# Patient Record
Sex: FEMALE | URBAN - METROPOLITAN AREA
[De-identification: names, ages, dates, MRNs, and addresses within clinical notes are randomized per-mention and may not be internally consistent; named-entity substitution may affect disease eponyms.]

---

## 2017-01-09 ENCOUNTER — EMERGENCY (EMERGENCY)
Facility: HOSPITAL | Age: 82
LOS: 0 days | Discharge: HOME | End: 2017-01-10
Admitting: EMERGENCY MEDICINE
Payer: MEDICARE

## 2017-01-09 DIAGNOSIS — R00.2 PALPITATIONS: ICD-10-CM

## 2017-01-09 DIAGNOSIS — I10 ESSENTIAL (PRIMARY) HYPERTENSION: ICD-10-CM

## 2017-01-09 DIAGNOSIS — M06.9 RHEUMATOID ARTHRITIS, UNSPECIFIED: ICD-10-CM

## 2017-01-09 DIAGNOSIS — J11.1 INFLUENZA DUE TO UNIDENTIFIED INFLUENZA VIRUS WITH OTHER RESPIRATORY MANIFESTATIONS: ICD-10-CM

## 2017-01-09 DIAGNOSIS — E78.5 HYPERLIPIDEMIA, UNSPECIFIED: ICD-10-CM

## 2017-01-09 DIAGNOSIS — J10.1 INFLUENZA DUE TO OTHER IDENTIFIED INFLUENZA VIRUS WITH OTHER RESPIRATORY MANIFESTATIONS: ICD-10-CM

## 2017-01-09 PROCEDURE — 99284 EMERGENCY DEPT VISIT MOD MDM: CPT

## 2017-01-09 PROCEDURE — 71010: CPT | Mod: 26

## 2017-01-09 PROCEDURE — 93010 ELECTROCARDIOGRAM REPORT: CPT

## 2017-01-10 DIAGNOSIS — Z79.899 OTHER LONG TERM (CURRENT) DRUG THERAPY: ICD-10-CM

## 2017-01-10 DIAGNOSIS — R42 DIZZINESS AND GIDDINESS: ICD-10-CM

## 2017-01-10 DIAGNOSIS — J11.1 INFLUENZA DUE TO UNIDENTIFIED INFLUENZA VIRUS WITH OTHER RESPIRATORY MANIFESTATIONS: ICD-10-CM

## 2017-01-10 DIAGNOSIS — R00.2 PALPITATIONS: ICD-10-CM

## 2017-01-10 PROCEDURE — 99284 EMERGENCY DEPT VISIT MOD MDM: CPT

## 2017-01-10 PROCEDURE — 93010 ELECTROCARDIOGRAM REPORT: CPT

## 2017-01-16 ENCOUNTER — RX RENEWAL (OUTPATIENT)
Age: 82
End: 2017-01-16

## 2017-01-16 DIAGNOSIS — Z13.820 ENCOUNTER FOR SCREENING FOR OSTEOPOROSIS: ICD-10-CM

## 2017-03-22 ENCOUNTER — APPOINTMENT (OUTPATIENT)
Dept: OBGYN | Facility: CLINIC | Age: 82
End: 2017-03-22

## 2017-03-22 VITALS
SYSTOLIC BLOOD PRESSURE: 132 MMHG | HEIGHT: 65 IN | BODY MASS INDEX: 24.66 KG/M2 | WEIGHT: 148 LBS | DIASTOLIC BLOOD PRESSURE: 86 MMHG

## 2017-03-22 DIAGNOSIS — K64.8 OTHER HEMORRHOIDS: ICD-10-CM

## 2017-03-22 LAB
BILIRUB UR QL STRIP: NORMAL
COLLECTION METHOD: NORMAL
GLUCOSE UR-MCNC: NORMAL
HCG UR QL: 0.2 EU/DL
HEMOCCULT SP1 STL QL: NEGATIVE
HGB UR QL STRIP.AUTO: ABNORMAL
KETONES UR-MCNC: NORMAL
LEUKOCYTE ESTERASE UR QL STRIP: NORMAL
NITRITE UR QL STRIP: NORMAL
PH UR STRIP: 5.5
PROT UR STRIP-MCNC: NORMAL
SP GR UR STRIP: 1.02

## 2017-03-27 LAB
BACTERIA UR CULT: NORMAL
CYTOLOGY CVX/VAG DOC THIN PREP: NORMAL

## 2017-06-29 ENCOUNTER — APPOINTMENT (OUTPATIENT)
Dept: OBGYN | Facility: CLINIC | Age: 82
End: 2017-06-29

## 2017-06-29 VITALS
WEIGHT: 148 LBS | BODY MASS INDEX: 24.36 KG/M2 | SYSTOLIC BLOOD PRESSURE: 129 MMHG | HEIGHT: 65.5 IN | DIASTOLIC BLOOD PRESSURE: 78 MMHG

## 2017-06-29 DIAGNOSIS — Z82.5 FAMILY HISTORY OF ASTHMA AND OTHER CHRONIC LOWER RESPIRATORY DISEASES: ICD-10-CM

## 2017-06-29 DIAGNOSIS — Z80.9 FAMILY HISTORY OF MALIGNANT NEOPLASM, UNSPECIFIED: ICD-10-CM

## 2017-06-29 DIAGNOSIS — Z82.3 FAMILY HISTORY OF STROKE: ICD-10-CM

## 2017-06-29 LAB
BILIRUB UR QL STRIP: NORMAL
COLLECTION METHOD: NORMAL
GLUCOSE UR-MCNC: NORMAL
HCG UR QL: 0.2 EU/DL
HGB UR QL STRIP.AUTO: NORMAL
KETONES UR-MCNC: NORMAL
LEUKOCYTE ESTERASE UR QL STRIP: NORMAL
NITRITE UR QL STRIP: NORMAL
PH UR STRIP: 5
PROT UR STRIP-MCNC: NORMAL
SP GR UR STRIP: >=1.03

## 2017-06-29 RX ORDER — SIMVASTATIN 80 MG/1
TABLET, FILM COATED ORAL
Refills: 0 | Status: DISCONTINUED | COMMUNITY

## 2017-10-04 ENCOUNTER — APPOINTMENT (OUTPATIENT)
Dept: OBGYN | Facility: CLINIC | Age: 82
End: 2017-10-04
Payer: MEDICARE

## 2017-10-04 VITALS
BODY MASS INDEX: 25.83 KG/M2 | HEIGHT: 65 IN | WEIGHT: 155 LBS | SYSTOLIC BLOOD PRESSURE: 122 MMHG | DIASTOLIC BLOOD PRESSURE: 78 MMHG

## 2017-10-04 DIAGNOSIS — N90.89 OTHER SPECIFIED NONINFLAMMATORY DISORDERS OF VULVA AND PERINEUM: ICD-10-CM

## 2017-10-04 PROCEDURE — 99213 OFFICE O/P EST LOW 20 MIN: CPT

## 2017-10-04 RX ORDER — RANITIDINE 75 MG/1
TABLET ORAL
Refills: 0 | Status: DISCONTINUED | COMMUNITY
End: 2017-10-04

## 2017-11-21 ENCOUNTER — APPOINTMENT (OUTPATIENT)
Dept: DERMATOLOGY | Facility: CLINIC | Age: 82
End: 2017-11-21

## 2018-03-26 ENCOUNTER — APPOINTMENT (OUTPATIENT)
Dept: OBGYN | Facility: CLINIC | Age: 83
End: 2018-03-26
Payer: MEDICARE

## 2018-03-26 ENCOUNTER — APPOINTMENT (OUTPATIENT)
Dept: DERMATOLOGY | Facility: CLINIC | Age: 83
End: 2018-03-26
Payer: MEDICARE

## 2018-03-26 VITALS
HEIGHT: 65 IN | DIASTOLIC BLOOD PRESSURE: 74 MMHG | SYSTOLIC BLOOD PRESSURE: 125 MMHG | BODY MASS INDEX: 25.83 KG/M2 | WEIGHT: 155 LBS

## 2018-03-26 DIAGNOSIS — Z86.19 PERSONAL HISTORY OF OTHER INFECTIOUS AND PARASITIC DISEASES: ICD-10-CM

## 2018-03-26 DIAGNOSIS — I78.1 NEVUS, NON-NEOPLASTIC: ICD-10-CM

## 2018-03-26 DIAGNOSIS — L82.1 OTHER SEBORRHEIC KERATOSIS: ICD-10-CM

## 2018-03-26 DIAGNOSIS — D18.00 HEMANGIOMA UNSPECIFIED SITE: ICD-10-CM

## 2018-03-26 DIAGNOSIS — Z86.39 PERSONAL HISTORY OF OTHER ENDOCRINE, NUTRITIONAL AND METABOLIC DISEASE: ICD-10-CM

## 2018-03-26 LAB — HEMOCCULT SP1 STL QL: NEGATIVE

## 2018-03-26 PROCEDURE — 99214 OFFICE O/P EST MOD 30 MIN: CPT

## 2018-03-26 PROCEDURE — 99213 OFFICE O/P EST LOW 20 MIN: CPT

## 2018-03-26 PROCEDURE — 82270 OCCULT BLOOD FECES: CPT

## 2018-03-26 PROCEDURE — 81003 URINALYSIS AUTO W/O SCOPE: CPT | Mod: QW

## 2018-03-26 RX ORDER — ERGOCALCIFEROL 1.25 MG/1
1.25 MG CAPSULE, LIQUID FILLED ORAL
Qty: 12 | Refills: 0 | Status: DISCONTINUED | COMMUNITY
Start: 2016-10-04 | End: 2018-03-26

## 2018-03-26 RX ORDER — RALOXIFENE HYDROCHLORIDE 60 MG/1
60 TABLET, FILM COATED ORAL DAILY
Qty: 90 | Refills: 3 | Status: DISCONTINUED | COMMUNITY
Start: 2017-06-29 | End: 2018-03-26

## 2018-03-26 RX ORDER — ABATACEPT 125 MG/ML
INJECTION, SOLUTION SUBCUTANEOUS
Refills: 0 | Status: DISCONTINUED | COMMUNITY
End: 2018-03-26

## 2018-03-26 RX ORDER — FUROSEMIDE 40 MG/1
40 TABLET ORAL
Qty: 5 | Refills: 0 | Status: DISCONTINUED | COMMUNITY
Start: 2017-05-19 | End: 2018-03-26

## 2018-03-27 LAB
BILIRUB UR QL STRIP: NORMAL
COLLECTION METHOD: NORMAL
GLUCOSE UR-MCNC: NORMAL
HCG UR QL: 0.2 EU/DL
HGB UR QL STRIP.AUTO: NORMAL
KETONES UR-MCNC: NORMAL
LEUKOCYTE ESTERASE UR QL STRIP: NORMAL
NITRITE UR QL STRIP: NORMAL
PH UR STRIP: 5.5
PROT UR STRIP-MCNC: NORMAL
SP GR UR STRIP: 1.02

## 2018-04-03 LAB — CYTOLOGY CVX/VAG DOC THIN PREP: NORMAL

## 2018-06-30 ENCOUNTER — EMERGENCY (EMERGENCY)
Facility: HOSPITAL | Age: 83
LOS: 0 days | Discharge: HOME | End: 2018-07-01
Attending: EMERGENCY MEDICINE
Payer: MEDICARE

## 2018-06-30 VITALS — WEIGHT: 153 LBS | HEIGHT: 63 IN

## 2018-06-30 DIAGNOSIS — R00.2 PALPITATIONS: ICD-10-CM

## 2018-06-30 DIAGNOSIS — R55 SYNCOPE AND COLLAPSE: ICD-10-CM

## 2018-06-30 DIAGNOSIS — M06.9 RHEUMATOID ARTHRITIS, UNSPECIFIED: ICD-10-CM

## 2018-06-30 LAB
ALBUMIN SERPL ELPH-MCNC: 3 G/DL — LOW (ref 3.3–5)
ALP SERPL-CCNC: 85 U/L — SIGNIFICANT CHANGE UP (ref 40–120)
ALT FLD-CCNC: 20 U/L — SIGNIFICANT CHANGE UP (ref 12–78)
ANION GAP SERPL CALC-SCNC: 8 MMOL/L — SIGNIFICANT CHANGE UP (ref 5–17)
APTT BLD: 29 SEC — SIGNIFICANT CHANGE UP (ref 27.5–37.4)
AST SERPL-CCNC: 32 U/L — SIGNIFICANT CHANGE UP (ref 15–37)
BASOPHILS # BLD AUTO: 0.05 K/UL — SIGNIFICANT CHANGE UP (ref 0–0.2)
BASOPHILS NFR BLD AUTO: 0.8 % — SIGNIFICANT CHANGE UP (ref 0–2)
BILIRUB SERPL-MCNC: 0.4 MG/DL — SIGNIFICANT CHANGE UP (ref 0.2–1.2)
BUN SERPL-MCNC: 13 MG/DL — SIGNIFICANT CHANGE UP (ref 7–23)
CALCIUM SERPL-MCNC: 10 MG/DL — SIGNIFICANT CHANGE UP (ref 8.5–10.1)
CHLORIDE SERPL-SCNC: 103 MMOL/L — SIGNIFICANT CHANGE UP (ref 96–108)
CK SERPL-CCNC: 80 U/L — SIGNIFICANT CHANGE UP (ref 26–192)
CO2 SERPL-SCNC: 30 MMOL/L — SIGNIFICANT CHANGE UP (ref 22–31)
CREAT SERPL-MCNC: 0.82 MG/DL — SIGNIFICANT CHANGE UP (ref 0.5–1.3)
EOSINOPHIL # BLD AUTO: 0.35 K/UL — SIGNIFICANT CHANGE UP (ref 0–0.5)
EOSINOPHIL NFR BLD AUTO: 5.4 % — SIGNIFICANT CHANGE UP (ref 0–6)
GLUCOSE SERPL-MCNC: 94 MG/DL — SIGNIFICANT CHANGE UP (ref 70–99)
HCT VFR BLD CALC: 42.5 % — SIGNIFICANT CHANGE UP (ref 34.5–45)
HGB BLD-MCNC: 14.3 G/DL — SIGNIFICANT CHANGE UP (ref 11.5–15.5)
IMM GRANULOCYTES NFR BLD AUTO: 0.3 % — SIGNIFICANT CHANGE UP (ref 0–1.5)
INR BLD: 1.05 RATIO — SIGNIFICANT CHANGE UP (ref 0.88–1.16)
LYMPHOCYTES # BLD AUTO: 1.77 K/UL — SIGNIFICANT CHANGE UP (ref 1–3.3)
LYMPHOCYTES # BLD AUTO: 27.5 % — SIGNIFICANT CHANGE UP (ref 13–44)
MAGNESIUM SERPL-MCNC: 2.1 MG/DL — SIGNIFICANT CHANGE UP (ref 1.6–2.6)
MCHC RBC-ENTMCNC: 31.6 PG — SIGNIFICANT CHANGE UP (ref 27–34)
MCHC RBC-ENTMCNC: 33.6 GM/DL — SIGNIFICANT CHANGE UP (ref 32–36)
MCV RBC AUTO: 93.8 FL — SIGNIFICANT CHANGE UP (ref 80–100)
MONOCYTES # BLD AUTO: 0.41 K/UL — SIGNIFICANT CHANGE UP (ref 0–0.9)
MONOCYTES NFR BLD AUTO: 6.4 % — SIGNIFICANT CHANGE UP (ref 2–14)
NEUTROPHILS # BLD AUTO: 3.83 K/UL — SIGNIFICANT CHANGE UP (ref 1.8–7.4)
NEUTROPHILS NFR BLD AUTO: 59.6 % — SIGNIFICANT CHANGE UP (ref 43–77)
NRBC # BLD: 0 /100 WBCS — SIGNIFICANT CHANGE UP (ref 0–0)
NT-PROBNP SERPL-SCNC: 590 PG/ML — HIGH (ref 0–450)
PLATELET # BLD AUTO: 172 K/UL — SIGNIFICANT CHANGE UP (ref 150–400)
POTASSIUM SERPL-MCNC: 3.6 MMOL/L — SIGNIFICANT CHANGE UP (ref 3.5–5.3)
POTASSIUM SERPL-SCNC: 3.6 MMOL/L — SIGNIFICANT CHANGE UP (ref 3.5–5.3)
PROT SERPL-MCNC: 7.5 GM/DL — SIGNIFICANT CHANGE UP (ref 6–8.3)
PROTHROM AB SERPL-ACNC: 11.4 SEC — SIGNIFICANT CHANGE UP (ref 9.8–12.7)
RBC # BLD: 4.53 M/UL — SIGNIFICANT CHANGE UP (ref 3.8–5.2)
RBC # FLD: 13.1 % — SIGNIFICANT CHANGE UP (ref 10.3–14.5)
SODIUM SERPL-SCNC: 141 MMOL/L — SIGNIFICANT CHANGE UP (ref 135–145)
TROPONIN I SERPL-MCNC: <0.015 NG/ML — SIGNIFICANT CHANGE UP (ref 0.01–0.04)
WBC # BLD: 6.43 K/UL — SIGNIFICANT CHANGE UP (ref 3.8–10.5)
WBC # FLD AUTO: 6.43 K/UL — SIGNIFICANT CHANGE UP (ref 3.8–10.5)

## 2018-06-30 PROCEDURE — 93010 ELECTROCARDIOGRAM REPORT: CPT

## 2018-06-30 PROCEDURE — 71045 X-RAY EXAM CHEST 1 VIEW: CPT | Mod: 26

## 2018-06-30 PROCEDURE — 99285 EMERGENCY DEPT VISIT HI MDM: CPT

## 2018-06-30 RX ORDER — SODIUM CHLORIDE 9 MG/ML
3 INJECTION INTRAMUSCULAR; INTRAVENOUS; SUBCUTANEOUS ONCE
Qty: 0 | Refills: 0 | Status: COMPLETED | OUTPATIENT
Start: 2018-06-30 | End: 2018-06-30

## 2018-06-30 RX ADMIN — SODIUM CHLORIDE 3 MILLILITER(S): 9 INJECTION INTRAMUSCULAR; INTRAVENOUS; SUBCUTANEOUS at 20:57

## 2018-06-30 NOTE — ED STATDOCS - PROGRESS NOTE DETAILS
anderson Lott for Dr. Angela: 82 y/o female with a PMHx of RA, on CBD oil, HTN, ablation 1 year ago by Dr. Booth BIB daughter presents to the ED c/o intermittent palpitations. Pt had one episode of palpitations, chest heaviness and near syncope today while shopping. Denies missing Metroprolol meds today. Pt has been eating and drinking normally, no HA or dizziness. Hx of cerebral aneurysm  years ago, no sx related to that since then. PMD Erika. Will send pt to main ED for further evaluation.

## 2018-06-30 NOTE — ED ADULT NURSE NOTE - OBJECTIVE STATEMENT
Pt alert and oriented x3. pt presents with palpitations, pt on metoprolol, hx of ablation followed by Dr. Mcdowell. Pt states she had halter monitor in the past. Pt states with palpitations, dizziness and lightheadedness.

## 2018-06-30 NOTE — ED PROVIDER NOTE - MEDICAL DECISION MAKING DETAILS
Elderly female with palpitations intermittently for months. Out pt halter. Will check cardiac enzymes, labs, reassess.

## 2018-06-30 NOTE — ED PROVIDER NOTE - OBJECTIVE STATEMENT
84 y/o female with PMHx RA presents to the ED c/o palpitations and near syncope today. Pt states that she has had these sx for years. Pt states that at the end of a palpitation episode she will experience heaviness in her chest but not a pain. Has been seen by a cardiologist and PCP. Pt has had an ablation, been on a holter monitor. On Metoprolol for heart palpitations. Denies MAGALIS CP. Cardio- Dr. Booth.

## 2018-06-30 NOTE — ED PROVIDER NOTE - PROGRESS NOTE DETAILS
JG: Received signout from Dr. Wyman to f/u 2nd troponin and discharge if negative. 2nd troponin negative at this time and patient asymptomatic.  Pt. to f/u with Dr. James or Dr. Booth.

## 2018-07-01 VITALS
HEART RATE: 64 BPM | TEMPERATURE: 98 F | DIASTOLIC BLOOD PRESSURE: 58 MMHG | RESPIRATION RATE: 15 BRPM | OXYGEN SATURATION: 98 % | SYSTOLIC BLOOD PRESSURE: 155 MMHG

## 2018-07-01 LAB — TROPONIN I SERPL-MCNC: <0.015 NG/ML — SIGNIFICANT CHANGE UP (ref 0.01–0.04)

## 2018-07-06 ENCOUNTER — INPATIENT (INPATIENT)
Facility: HOSPITAL | Age: 83
LOS: 5 days | Discharge: HOME | End: 2018-07-12
Attending: INTERNAL MEDICINE | Admitting: INTERNAL MEDICINE
Payer: MEDICARE

## 2018-07-06 VITALS
HEART RATE: 81 BPM | WEIGHT: 156.09 LBS | OXYGEN SATURATION: 94 % | TEMPERATURE: 98 F | HEIGHT: 65 IN | SYSTOLIC BLOOD PRESSURE: 173 MMHG | DIASTOLIC BLOOD PRESSURE: 96 MMHG | RESPIRATION RATE: 16 BRPM

## 2018-07-06 DIAGNOSIS — I47.1 SUPRAVENTRICULAR TACHYCARDIA: ICD-10-CM

## 2018-07-06 DIAGNOSIS — Z91.041 RADIOGRAPHIC DYE ALLERGY STATUS: ICD-10-CM

## 2018-07-06 DIAGNOSIS — Z88.0 ALLERGY STATUS TO PENICILLIN: ICD-10-CM

## 2018-07-06 DIAGNOSIS — M06.9 RHEUMATOID ARTHRITIS, UNSPECIFIED: ICD-10-CM

## 2018-07-06 DIAGNOSIS — Z87.891 PERSONAL HISTORY OF NICOTINE DEPENDENCE: ICD-10-CM

## 2018-07-06 DIAGNOSIS — I10 ESSENTIAL (PRIMARY) HYPERTENSION: ICD-10-CM

## 2018-07-06 DIAGNOSIS — R55 SYNCOPE AND COLLAPSE: ICD-10-CM

## 2018-07-06 DIAGNOSIS — Z88.2 ALLERGY STATUS TO SULFONAMIDES: ICD-10-CM

## 2018-07-06 DIAGNOSIS — E03.9 HYPOTHYROIDISM, UNSPECIFIED: ICD-10-CM

## 2018-07-06 PROCEDURE — 93010 ELECTROCARDIOGRAM REPORT: CPT

## 2018-07-06 RX ORDER — SODIUM CHLORIDE 9 MG/ML
3 INJECTION INTRAMUSCULAR; INTRAVENOUS; SUBCUTANEOUS ONCE
Qty: 0 | Refills: 0 | Status: COMPLETED | OUTPATIENT
Start: 2018-07-06 | End: 2018-07-06

## 2018-07-06 RX ORDER — ASPIRIN/CALCIUM CARB/MAGNESIUM 324 MG
325 TABLET ORAL ONCE
Qty: 0 | Refills: 0 | Status: COMPLETED | OUTPATIENT
Start: 2018-07-06 | End: 2018-07-06

## 2018-07-06 NOTE — ED ADULT TRIAGE NOTE - CHIEF COMPLAINT QUOTE
c/o dizziness, palpitations today, pt was discharged from  4 days ago for similar symptoms. Denies CP, SOB

## 2018-07-07 DIAGNOSIS — E03.9 HYPOTHYROIDISM, UNSPECIFIED: ICD-10-CM

## 2018-07-07 DIAGNOSIS — M05.741 RHEUMATOID ARTHRITIS WITH RHEUMATOID FACTOR OF RIGHT HAND WITHOUT ORGAN OR SYSTEMS INVOLVEMENT: ICD-10-CM

## 2018-07-07 DIAGNOSIS — I47.1 SUPRAVENTRICULAR TACHYCARDIA: ICD-10-CM

## 2018-07-07 DIAGNOSIS — R00.1 BRADYCARDIA, UNSPECIFIED: ICD-10-CM

## 2018-07-07 DIAGNOSIS — R55 SYNCOPE AND COLLAPSE: ICD-10-CM

## 2018-07-07 LAB
ALBUMIN SERPL ELPH-MCNC: 2.8 G/DL — LOW (ref 3.3–5)
ALP SERPL-CCNC: 82 U/L — SIGNIFICANT CHANGE UP (ref 40–120)
ALT FLD-CCNC: 16 U/L — SIGNIFICANT CHANGE UP (ref 12–78)
ANION GAP SERPL CALC-SCNC: 8 MMOL/L — SIGNIFICANT CHANGE UP (ref 5–17)
APTT BLD: 28.7 SEC — SIGNIFICANT CHANGE UP (ref 27.5–37.4)
AST SERPL-CCNC: 22 U/L — SIGNIFICANT CHANGE UP (ref 15–37)
BASOPHILS # BLD AUTO: 0.05 K/UL — SIGNIFICANT CHANGE UP (ref 0–0.2)
BASOPHILS NFR BLD AUTO: 0.7 % — SIGNIFICANT CHANGE UP (ref 0–2)
BILIRUB SERPL-MCNC: 0.3 MG/DL — SIGNIFICANT CHANGE UP (ref 0.2–1.2)
BUN SERPL-MCNC: 19 MG/DL — SIGNIFICANT CHANGE UP (ref 7–23)
CALCIUM SERPL-MCNC: 9.5 MG/DL — SIGNIFICANT CHANGE UP (ref 8.5–10.1)
CHLORIDE SERPL-SCNC: 105 MMOL/L — SIGNIFICANT CHANGE UP (ref 96–108)
CK SERPL-CCNC: 39 U/L — SIGNIFICANT CHANGE UP (ref 26–192)
CK SERPL-CCNC: 42 U/L — SIGNIFICANT CHANGE UP (ref 26–192)
CO2 SERPL-SCNC: 29 MMOL/L — SIGNIFICANT CHANGE UP (ref 22–31)
CREAT SERPL-MCNC: 1.17 MG/DL — SIGNIFICANT CHANGE UP (ref 0.5–1.3)
EOSINOPHIL # BLD AUTO: 0.35 K/UL — SIGNIFICANT CHANGE UP (ref 0–0.5)
EOSINOPHIL NFR BLD AUTO: 5 % — SIGNIFICANT CHANGE UP (ref 0–6)
GLUCOSE SERPL-MCNC: 99 MG/DL — SIGNIFICANT CHANGE UP (ref 70–99)
HCT VFR BLD CALC: 38.6 % — SIGNIFICANT CHANGE UP (ref 34.5–45)
HGB BLD-MCNC: 13.4 G/DL — SIGNIFICANT CHANGE UP (ref 11.5–15.5)
IMM GRANULOCYTES NFR BLD AUTO: 0.4 % — SIGNIFICANT CHANGE UP (ref 0–1.5)
INR BLD: 1.07 RATIO — SIGNIFICANT CHANGE UP (ref 0.88–1.16)
LYMPHOCYTES # BLD AUTO: 1.72 K/UL — SIGNIFICANT CHANGE UP (ref 1–3.3)
LYMPHOCYTES # BLD AUTO: 24.6 % — SIGNIFICANT CHANGE UP (ref 13–44)
MCHC RBC-ENTMCNC: 32.1 PG — SIGNIFICANT CHANGE UP (ref 27–34)
MCHC RBC-ENTMCNC: 34.7 GM/DL — SIGNIFICANT CHANGE UP (ref 32–36)
MCV RBC AUTO: 92.3 FL — SIGNIFICANT CHANGE UP (ref 80–100)
MONOCYTES # BLD AUTO: 0.49 K/UL — SIGNIFICANT CHANGE UP (ref 0–0.9)
MONOCYTES NFR BLD AUTO: 7 % — SIGNIFICANT CHANGE UP (ref 2–14)
NEUTROPHILS # BLD AUTO: 4.36 K/UL — SIGNIFICANT CHANGE UP (ref 1.8–7.4)
NEUTROPHILS NFR BLD AUTO: 62.3 % — SIGNIFICANT CHANGE UP (ref 43–77)
NRBC # BLD: 0 /100 WBCS — SIGNIFICANT CHANGE UP (ref 0–0)
PLATELET # BLD AUTO: 178 K/UL — SIGNIFICANT CHANGE UP (ref 150–400)
POTASSIUM SERPL-MCNC: 3.7 MMOL/L — SIGNIFICANT CHANGE UP (ref 3.5–5.3)
POTASSIUM SERPL-SCNC: 3.7 MMOL/L — SIGNIFICANT CHANGE UP (ref 3.5–5.3)
PROT SERPL-MCNC: 7.1 GM/DL — SIGNIFICANT CHANGE UP (ref 6–8.3)
PROTHROM AB SERPL-ACNC: 11.6 SEC — SIGNIFICANT CHANGE UP (ref 9.8–12.7)
RBC # BLD: 4.18 M/UL — SIGNIFICANT CHANGE UP (ref 3.8–5.2)
RBC # FLD: 12.7 % — SIGNIFICANT CHANGE UP (ref 10.3–14.5)
SODIUM SERPL-SCNC: 142 MMOL/L — SIGNIFICANT CHANGE UP (ref 135–145)
TROPONIN I SERPL-MCNC: <0.015 NG/ML — SIGNIFICANT CHANGE UP (ref 0.01–0.04)
TSH SERPL-MCNC: 2.54 UIU/ML — SIGNIFICANT CHANGE UP (ref 0.36–3.74)
WBC # BLD: 7 K/UL — SIGNIFICANT CHANGE UP (ref 3.8–10.5)
WBC # FLD AUTO: 7 K/UL — SIGNIFICANT CHANGE UP (ref 3.8–10.5)

## 2018-07-07 PROCEDURE — 99285 EMERGENCY DEPT VISIT HI MDM: CPT

## 2018-07-07 PROCEDURE — 71045 X-RAY EXAM CHEST 1 VIEW: CPT | Mod: 26

## 2018-07-07 RX ORDER — ACETAMINOPHEN 500 MG
650 TABLET ORAL EVERY 6 HOURS
Qty: 0 | Refills: 0 | Status: DISCONTINUED | OUTPATIENT
Start: 2018-07-07 | End: 2018-07-12

## 2018-07-07 RX ORDER — RALOXIFENE HYDROCHLORIDE 60 MG/1
60 TABLET, COATED ORAL DAILY
Qty: 0 | Refills: 0 | Status: DISCONTINUED | OUTPATIENT
Start: 2018-07-07 | End: 2018-07-12

## 2018-07-07 RX ORDER — ATORVASTATIN CALCIUM 80 MG/1
20 TABLET, FILM COATED ORAL AT BEDTIME
Qty: 0 | Refills: 0 | Status: DISCONTINUED | OUTPATIENT
Start: 2018-07-07 | End: 2018-07-12

## 2018-07-07 RX ORDER — SODIUM CHLORIDE 9 MG/ML
1500 INJECTION INTRAMUSCULAR; INTRAVENOUS; SUBCUTANEOUS
Qty: 0 | Refills: 0 | Status: DISCONTINUED | OUTPATIENT
Start: 2018-07-07 | End: 2018-07-10

## 2018-07-07 RX ORDER — ENOXAPARIN SODIUM 100 MG/ML
40 INJECTION SUBCUTANEOUS EVERY 24 HOURS
Qty: 0 | Refills: 0 | Status: DISCONTINUED | OUTPATIENT
Start: 2018-07-07 | End: 2018-07-12

## 2018-07-07 RX ORDER — LEVOTHYROXINE SODIUM 125 MCG
50 TABLET ORAL DAILY
Qty: 0 | Refills: 0 | Status: DISCONTINUED | OUTPATIENT
Start: 2018-07-07 | End: 2018-07-12

## 2018-07-07 RX ORDER — ASPIRIN/CALCIUM CARB/MAGNESIUM 324 MG
81 TABLET ORAL DAILY
Qty: 0 | Refills: 0 | Status: DISCONTINUED | OUTPATIENT
Start: 2018-07-07 | End: 2018-07-12

## 2018-07-07 RX ORDER — GABAPENTIN 400 MG/1
300 CAPSULE ORAL THREE TIMES A DAY
Qty: 0 | Refills: 0 | Status: DISCONTINUED | OUTPATIENT
Start: 2018-07-07 | End: 2018-07-12

## 2018-07-07 RX ORDER — METOPROLOL TARTRATE 50 MG
50 TABLET ORAL EVERY 12 HOURS
Qty: 0 | Refills: 0 | Status: DISCONTINUED | OUTPATIENT
Start: 2018-07-07 | End: 2018-07-09

## 2018-07-07 RX ADMIN — GABAPENTIN 300 MILLIGRAM(S): 400 CAPSULE ORAL at 13:33

## 2018-07-07 RX ADMIN — SODIUM CHLORIDE 75 MILLILITER(S): 9 INJECTION INTRAMUSCULAR; INTRAVENOUS; SUBCUTANEOUS at 18:10

## 2018-07-07 RX ADMIN — RALOXIFENE HYDROCHLORIDE 60 MILLIGRAM(S): 60 TABLET, COATED ORAL at 11:48

## 2018-07-07 RX ADMIN — Medication 650 MILLIGRAM(S): at 22:43

## 2018-07-07 RX ADMIN — Medication 50 MILLIGRAM(S): at 18:03

## 2018-07-07 RX ADMIN — Medication 50 MICROGRAM(S): at 14:49

## 2018-07-07 RX ADMIN — SODIUM CHLORIDE 3 MILLILITER(S): 9 INJECTION INTRAMUSCULAR; INTRAVENOUS; SUBCUTANEOUS at 03:00

## 2018-07-07 RX ADMIN — GABAPENTIN 300 MILLIGRAM(S): 400 CAPSULE ORAL at 21:24

## 2018-07-07 RX ADMIN — ATORVASTATIN CALCIUM 20 MILLIGRAM(S): 80 TABLET, FILM COATED ORAL at 21:24

## 2018-07-07 RX ADMIN — Medication 325 MILLIGRAM(S): at 03:00

## 2018-07-07 NOTE — ED PROVIDER NOTE - OBJECTIVE STATEMENT
83 year old female with PMH of RA, allergy to contrast dye and PCN, hx of ablation for unclear tachyarrythmia, patient of Dr. James (Cards/PMD) and Dr. Ngo (EP) who presents with cc of chest pain, palpitation, and near syncope. No abdominal pain. No fever or chills. 83 year old female with PMH of RA, allergy to contrast dye and PCN, hx of ablation for unclear nature tachyarrhythmia, patient of Dr. James (Cards/PMD) and Dr. Ngo (EP) who presents with cc of chest pain, pressure like, non radiating and, palpitation, and near syncope. No abdominal pain. No fever or chills. No abdominal pain. No hematochezia or melena. No rash. No recent exotic travel. No IVDU.

## 2018-07-07 NOTE — ED ADULT NURSE NOTE - OBJECTIVE STATEMENT
Pt presented to ED c/o dizziness. As per pt, pt's been experiencing having on and off dizziness, palpitations, and weakness for few weeks. Was seen at  ED 4 days ago and discharged. Pt returned today due to continuation of symptoms along w/ near syncopal episodes. Upon arrival to ED, dizziness and palpitation resolved but continues to c/o weakness.

## 2018-07-07 NOTE — H&P ADULT - ASSESSMENT
pt is 82 yo female with PMH as per HPI here with near syncope    # near syncope - likely cardiogenic given patients hx and given same sx last time prior to ablation, possible she was dehydrated and orthostatic with near syncope  - admit to tele and will get cardio and EP eval  - may need EP study this admission and will d/w  dr galo  - continue metoprolol, aspirin   - r/o ACS with serial trop   - supportive care adn gentle hydration  - will check orthostatics  - urged pt to stay well hydrated during summer    # RA - pt no longer recieving treatment.    - outpt f/u with dr macedo.  states she recieved last dose of arencia in january    dvt proph - lovenox

## 2018-07-07 NOTE — ED PROVIDER NOTE - MEDICAL DECISION MAKING DETAILS
Kadeem LONGORIA: Patient to be admitted for further evaluation of near syncope. Hospitalist admission of patient is appreciated.

## 2018-07-07 NOTE — CONSULT NOTE ADULT - ASSESSMENT
7/7/18:  Pt with above history and recurrent palpitations and near syncope.  Most likely having recurrent SVT but will continue to monitor on telemetry.  I doubt CBT oils are contributing to her symptoms but possible and will continue to monitor.  If no arrhythmias, will consider a LINQ insertion.  Dr Booth consulted and will await his opinion and further work-up and treatment.  Dr James will follow up on Monday  Continue her current meds and as outlined by medicine, etal.  Will follow.

## 2018-07-07 NOTE — H&P ADULT - NSHPPHYSICALEXAM_GEN_ALL_CORE
GEN: lying in bed, NAD  HEENT:   NC/AT, pupils equal and reactive, EOMI  CV:  +S1, +S2, RRR  RESP:   lungs clear to auscultation bilaterally, no wheeze, rales, rhonchi   BREAST:  not examined  GI:  abdomen soft, non-tender, non-distended, normoactive BS  RECTAL:  not examined  :  not examined  MSK:   normal muscle tone  EXT:  no edema, changes c/w RA  NEURO:  AAOX3, no focal neurological deficits  SKIN:  no rashes

## 2018-07-07 NOTE — ED ADULT NURSE REASSESSMENT NOTE - NS ED NURSE REASSESS COMMENT FT1
Pt in stretcher. Appears to be comfortable. Lethargic but arouable to verbal stimuli. Continues to c/o weakness. Breathing spontaneously on RA. VS as documented. awaiting to be seen by hospitalist. All needs are met and safety maintained. Will continue to monitor.

## 2018-07-07 NOTE — PATIENT PROFILE ADULT. - ABILITY TO HEAR (WITH HEARING AID OR HEARING APPLIANCE IF NORMALLY USED):
Mildly to Moderately Impaired: difficulty hearing in some environments or speaker may need to increase volume or speak distinctly/wears hearing aides

## 2018-07-07 NOTE — H&P ADULT - HISTORY OF PRESENT ILLNESS
pt is 82 yo female with PMH of RA, ablation for arrythmia with dr galo about a year ago and now presents with dizziness and almost passing out last night.  pt states around 9 pm she felt the same flutter type of sensation she had prior to having her ablation last year with dr galo and this was associated with lightheadedness and chest pressure and some SOB.  she went and laid down immediately but did not loose consciousness.  she was seen in the ED  for palpitations and sent home with rec for outpt f/u, holter and had followed up in dr mclaughlin office and had jsut resumed normal activities when events occurred.  since arriving to the ED she has not  had any further episodes and has been sinus briseyda on tele monitor.    no diaphoresis, fever, chills.    PMH - as per HPI  PSH - as per HPI   FH - mother - CAD  at age 93, father  at age 77 with hx of RA  SH - hx of tob use and smoked til 8 years ago.  20+ PY hx, occ ETOH use and denies drug use  lives alone at home

## 2018-07-07 NOTE — ED ADULT NURSE REASSESSMENT NOTE - NS ED NURSE REASSESS COMMENT FT1
Pt received from SAURABH Arias. Patient A&Ox3. VSS. No complaints at this time. Pt given breakfast tray. Safety maintained. Will continue to monitor.

## 2018-07-07 NOTE — CONSULT NOTE ADULT - SUBJECTIVE AND OBJECTIVE BOX
CHIEF COMPLAINT:  Patient is a 83y old  Female who presents with a chief complaint of I almost passed out (2018 10:13)      HPI:  18:  pt is 84 yo female with PMH of RA (followed by Dr Mccray), hypothyroidism and ablation for SVT arrhythmias with Dr Booth over a year ago and now presents with dizziness and almost passing out last night.   She states around 9 pm she felt the same flutter type of sensation she had prior to having her ablation last year with Dr Booth and this was associated with lightheadedness and chest pressure and some SOB.   She went and laid down immediately but did not loose consciousness.  She was seen in the ED  for palpitations and sent home with rec for outpt f/u, holter and had followed up in our office and had just resumed normal activities when events occurred.  Since arriving to the ED she has not  had any further episodes and has been sinus briseyda on tele monitor.   She has no anginal chest pains, SOB, diaphoresis, fever or chills.  She has been using CBT oils for her RA with significant improvement in her hand pains and stiffness.      SOCIAL Hx:  hx of tobacco use and smoked til 8 years ago.  20+ pack yrs hx,   occ ETOH use and denies drug use  lives alone at home        PMHx:  PAST MEDICAL & SURGICAL HISTORY:  Rheumatoid arthritis-Dr Mccray  SVT ablation-Dr Booth  Hypothyroidism      FAMILY HISTORY:   FAMILY HISTORY:  Mother - CAD  at age 93, father  at age 77 with hx of RA      ALLERGIES:  Allergies  contrast media (iodine-based) (Unknown)  penicillins (Unknown)  sulfa drugs (Unknown)      REVIEW OF SYSTEMS:    CONSTITUTIONAL: No weakness, fevers or chills  EYES/ENT: No visual changes;  No vertigo or throat pain   NECK: No pain or stiffness  RESPIRATORY: No cough, wheezing, hemoptysis; No shortness of breath  CARDIOVASCULAR: No chest pain or palpitations  GASTROINTESTINAL: No abdominal or epigastric pain. No nausea, vomiting, or hematemesis; No diarrhea or constipation. No melena or hematochezia.  GENITOURINARY: No dysuria, frequency or hematuria  NEUROLOGICAL: No numbness or weakness  SKIN: No itching, burning, rashes, or lesions   All other review of systems is negative unless indicated above      Vital Signs Last 24 Hrs  T(C): 36.8 (2018 16:05), Max: 37.1 (2018 03:31)  T(F): 98.3 (2018 16:05), Max: 98.7 (2018 03:31)  HR: 56 (2018 16:05) (55 - 81)  BP: 139/- (2018 16:05) (131/81 - 173/96)  BP(mean): --  RR: 17 (2018 16:05) (16 - 18)  SpO2: 96% (2018 16:05) (94% - 98%)      PHYSICAL EXAM:   Constitutional: NAD, awake and alert, well-developed  HEENT: PERR, EOMI, Normal Hearing, MMM  Neck: Soft and supple, No LAD, No JVD  Respiratory: Breath sounds are clear bilaterally, No wheezing, rales or rhonchi  Cardiovascular: S1 and S2, regular rate and rhythm, soft GABRIEL at LLSB and base as before, no gallops or rubs  Gastrointestinal: Bowel Sounds present, soft, nontender, nondistended, no guarding, no rebound  Extremities: No peripheral edema, 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  Vascular: 2+ peripheral pulses  Neurological: A/O x 3, no focal deficits  Musculoskeletal: 5/5 strength b/l upper and lower extremities  Skin: No rashes      MEDICATIONS  (STANDING):  aspirin enteric coated 81 milliGRAM(s) Oral daily  atorvastatin 20 milliGRAM(s) Oral at bedtime  enoxaparin Injectable 40 milliGRAM(s) SubCutaneous every 24 hours  gabapentin 300 milliGRAM(s) Oral three times a day  levothyroxine 50 MICROGram(s) Oral daily  metoprolol tartrate 50 milliGRAM(s) Oral every 12 hours  raloxifene 60 milliGRAM(s) Oral daily  ranitidine  Oral Tab/Cap - Peds 150 milliGRAM(s) Oral three times a day  sodium chloride 0.9%. 1500 milliLiter(s) (75 mL/Hr) IV Continuous <Continuous>      LABS: All Labs Reviewed:                        13.4   7.00  )-----------( 178      ( 2018 00:59 )             38.6     07-07    142  |  105  |  19  ----------------------------<  99  3.7   |  29  |  1.17    Ca    9.5      2018 00:59    TPro  7.1  /  Alb  2.8<L>  /  TBili  0.3  /  DBili  x   /  AST  22  /  ALT  16  /  AlkPhos  82      PT/INR - ( 2018 00:59 )   PT: 11.6 sec;   INR: 1.07 ratio         PTT - ( 2018 00:59 )  PTT:28.7 sec  CARDIAC MARKERS ( 2018 11:21 )  <0.015 ng/mL / x     / 42 U/L / x     / x      CARDIAC MARKERS ( 2018 04:50 )  <0.015 ng/mL / x     / x     / x     / x      CARDIAC MARKERS ( 2018 00:59 )  <0.015 ng/mL / x     / x     / x     / x            BLOOD CULTURES:   LIPID PROFILE     RADIOLOGY:  CXR: 18:  The lungs are clear. The heart size is normal.   [place] sutures noted along the left greater trochanter on the.   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  Clear lungs.       EK18:  Normal sinus rhythm  Possible Left atrial enlargement  When compared with ECG of 2017 21:23, Premature atrial complexes are no longer Present    TELEMETRY:  18:  NSR, Sinus bradycardia    ECHO:

## 2018-07-07 NOTE — ED ADULT NURSE REASSESSMENT NOTE - NS ED NURSE REASSESS COMMENT FT1
Pt rec'd from SAURABH Ross. AxOx4. No acute distress. Cardiac monitoring in place, SB as per cardiac tech. VSS. No dizziness at this time. Ambulating with steady gait. Safety and comfort maintained.

## 2018-07-07 NOTE — H&P ADULT - NSHPLABSRESULTS_GEN_ALL_CORE
13.4   7.00  )-----------( 178      ( 07 Jul 2018 00:59 )             38.6     07-07    142  |  105  |  19  ----------------------------<  99  3.7   |  29  |  1.17    Ca    9.5      07 Jul 2018 00:59    TPro  7.1  /  Alb  2.8<L>  /  TBili  0.3  /  DBili  x   /  AST  22  /  ALT  16  /  AlkPhos  82  07-07    CARDIAC MARKERS ( 07 Jul 2018 04:50 )  <0.015 ng/mL / x     / x     / x     / x      CARDIAC MARKERS ( 07 Jul 2018 00:59 )  <0.015 ng/mL / x     / x     / x     / x          LIVER FUNCTIONS - ( 07 Jul 2018 00:59 )  Alb: 2.8 g/dL / Pro: 7.1 gm/dL / ALK PHOS: 82 U/L / ALT: 16 U/L / AST: 22 U/L / GGT: x           PT/INR - ( 07 Jul 2018 00:59 )   PT: 11.6 sec;   INR: 1.07 ratio         PTT - ( 07 Jul 2018 00:59 )  PTT:28.7 sec    EKG - NSR no st-t changes indic of ischemia

## 2018-07-08 LAB
ANION GAP SERPL CALC-SCNC: 11 MMOL/L — SIGNIFICANT CHANGE UP (ref 5–17)
BUN SERPL-MCNC: 15 MG/DL — SIGNIFICANT CHANGE UP (ref 7–23)
CALCIUM SERPL-MCNC: 9.1 MG/DL — SIGNIFICANT CHANGE UP (ref 8.5–10.1)
CHLORIDE SERPL-SCNC: 108 MMOL/L — SIGNIFICANT CHANGE UP (ref 96–108)
CO2 SERPL-SCNC: 23 MMOL/L — SIGNIFICANT CHANGE UP (ref 22–31)
CREAT SERPL-MCNC: 0.66 MG/DL — SIGNIFICANT CHANGE UP (ref 0.5–1.3)
GLUCOSE SERPL-MCNC: 89 MG/DL — SIGNIFICANT CHANGE UP (ref 70–99)
POTASSIUM SERPL-MCNC: 4 MMOL/L — SIGNIFICANT CHANGE UP (ref 3.5–5.3)
POTASSIUM SERPL-SCNC: 4 MMOL/L — SIGNIFICANT CHANGE UP (ref 3.5–5.3)
SODIUM SERPL-SCNC: 142 MMOL/L — SIGNIFICANT CHANGE UP (ref 135–145)
T3 SERPL-MCNC: 82 NG/DL — SIGNIFICANT CHANGE UP (ref 80–200)
T4 AB SER-ACNC: 8.3 UG/DL — SIGNIFICANT CHANGE UP (ref 4.6–12)

## 2018-07-08 RX ORDER — DOCUSATE SODIUM 100 MG
100 CAPSULE ORAL THREE TIMES A DAY
Qty: 0 | Refills: 0 | Status: DISCONTINUED | OUTPATIENT
Start: 2018-07-08 | End: 2018-07-12

## 2018-07-08 RX ADMIN — Medication 650 MILLIGRAM(S): at 00:00

## 2018-07-08 RX ADMIN — Medication 50 MILLIGRAM(S): at 18:39

## 2018-07-08 RX ADMIN — Medication 81 MILLIGRAM(S): at 13:13

## 2018-07-08 RX ADMIN — GABAPENTIN 300 MILLIGRAM(S): 400 CAPSULE ORAL at 13:14

## 2018-07-08 RX ADMIN — GABAPENTIN 300 MILLIGRAM(S): 400 CAPSULE ORAL at 21:58

## 2018-07-08 RX ADMIN — Medication 100 MILLIGRAM(S): at 21:58

## 2018-07-08 RX ADMIN — SODIUM CHLORIDE 75 MILLILITER(S): 9 INJECTION INTRAMUSCULAR; INTRAVENOUS; SUBCUTANEOUS at 05:55

## 2018-07-08 RX ADMIN — ATORVASTATIN CALCIUM 20 MILLIGRAM(S): 80 TABLET, FILM COATED ORAL at 21:58

## 2018-07-08 RX ADMIN — GABAPENTIN 300 MILLIGRAM(S): 400 CAPSULE ORAL at 05:51

## 2018-07-08 RX ADMIN — Medication 50 MICROGRAM(S): at 05:51

## 2018-07-08 RX ADMIN — Medication 50 MILLIGRAM(S): at 05:51

## 2018-07-08 NOTE — PROGRESS NOTE ADULT - SUBJECTIVE AND OBJECTIVE BOX
CHIEF COMPLAINT:  Patient is a 83y old  Female who presents with a chief complaint of I almost passed out (2018 10:13)      HPI:  18:  pt is 84 yo female with PMH of RA (followed by Dr Mccray), hypothyroidism and ablation for SVT arrhythmias with Dr Booth over a year ago and now presents with dizziness and almost passing out last night.   She states around 9 pm she felt the same flutter type of sensation she had prior to having her ablation last year with Dr Booth and this was associated with lightheadedness and chest pressure and some SOB.   She went and laid down immediately but did not loose consciousness.  She was seen in the ED  for palpitations and sent home with rec for outpt f/u, holter and had followed up in our office and had just resumed normal activities when events occurred.  Since arriving to the ED she has not  had any further episodes and has been sinus briseyda on tele monitor.   She has no anginal chest pains, SOB, diaphoresis, fever or chills.  She has been using CBT oils for her RA with significant improvement in her hand pains and stiffness.    18:  No arrhythmias seen on the monitor.  All labs unremarkable so far.  Awaiting Dr Booth's input.  Will likely need a LINQ placement if nothing shows on the monitor overnight.  Whether the CBT oil is contributing to her symptoms???      SOCIAL Hx:  hx of tobacco use and smoked til 8 years ago.  20+ pack yrs hx,   occ ETOH use and denies drug use  lives alone at home        PMHx:  PAST MEDICAL & SURGICAL HISTORY:  Rheumatoid arthritis-Dr Mccray  SVT ablation-Dr Booth  Hypothyroidism      FAMILY HISTORY:   FAMILY HISTORY:  Mother - CAD  at age 93, father  at age 77 with hx of RA      ALLERGIES:  Allergies  contrast media (iodine-based) (Unknown)  penicillins (Unknown)  sulfa drugs (Unknown)      REVIEW OF SYSTEMS:    CONSTITUTIONAL: No weakness, fevers or chills  EYES/ENT: No visual changes;  No vertigo or throat pain   NECK: No pain or stiffness  RESPIRATORY: No cough, wheezing, hemoptysis; No shortness of breath  CARDIOVASCULAR: No chest pain or palpitations  GASTROINTESTINAL: No abdominal or epigastric pain. No nausea, vomiting, or hematemesis; No diarrhea or constipation. No melena or hematochezia.  GENITOURINARY: No dysuria, frequency or hematuria  NEUROLOGICAL: No numbness or weakness  SKIN: No itching, burning, rashes, or lesions   All other review of systems is negative unless indicated above    Vital Signs Last 24 Hrs  T(C): 36.4 (2018 04:46), Max: 36.8 (2018 16:05)  T(F): 97.6 (2018 04:46), Max: 98.3 (2018 16:05)  HR: 58 (2018 04:46) (56 - 58)  BP: 136/58 (2018 04:46) (131/81 - 139/78)  BP(mean): --  RR: 17 (2018 16:05) (17 - 18)  SpO2: 96% (2018 04:46) (96% - 98%)    I&O's Summary    2018 07:01  -  2018 07:00  --------------------------------------------------------  IN: 960 mL / OUT: 600 mL / NET: 360 mL      PHYSICAL EXAM:   Constitutional: NAD, awake and alert, well-developed  HEENT: PERR, EOMI, Normal Hearing, MMM  Neck: Soft and supple, No LAD, No JVD  Respiratory: Breath sounds are clear bilaterally, No wheezing, rales or rhonchi  Cardiovascular: S1 and S2, regular rate and rhythm, soft GABRIEL at LLSB and base as before, no gallops or rubs  Gastrointestinal: Bowel Sounds present, soft, nontender, nondistended, no guarding, no rebound  Extremities: No peripheral edema, 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  Vascular: 2+ peripheral pulses  Neurological: A/O x 3, no focal deficits  Musculoskeletal: 5/5 strength b/l upper and lower extremities  Skin: No rashes      MEDICATIONS  (STANDING):  aspirin enteric coated 81 milliGRAM(s) Oral daily  atorvastatin 20 milliGRAM(s) Oral at bedtime  enoxaparin Injectable 40 milliGRAM(s) SubCutaneous every 24 hours  gabapentin 300 milliGRAM(s) Oral three times a day  levothyroxine 50 MICROGram(s) Oral daily  metoprolol tartrate 50 milliGRAM(s) Oral every 12 hours  raloxifene 60 milliGRAM(s) Oral daily  ranitidine  Oral Tab/Cap - Peds 150 milliGRAM(s) Oral three times a day  sodium chloride 0.9%. 1500 milliLiter(s) (75 mL/Hr) IV Continuous <Continuous>      LABS: All Labs Reviewed:                        13.4   7.00  )-----------( 178      ( 2018 00:59 )             38.6         142  |  108  |  15  ----------------------------<  89  4.0   |  23  |  0.66    Ca    9.1      2018 06:22    TPro  7.1  /  Alb  2.8<L>  /  TBili  0.3  /  DBili  x   /  AST  22  /  ALT  16  /  AlkPhos  82  07-07    PT/INR - ( 2018 00:59 )   PT: 11.6 sec;   INR: 1.07 ratio         PTT - ( 2018 00:59 )  PTT:28.7 sec  CARDIAC MARKERS ( 2018 19:32 )  <0.015 ng/mL / x     / 39 U/L / x     / x      CARDIAC MARKERS ( 2018 11:21 )  <0.015 ng/mL / x     / 42 U/L / x     / x      CARDIAC MARKERS ( 2018 04:50 )  <0.015 ng/mL / x     / x     / x     / x      CARDIAC MARKERS ( 2018 00:59 )  <0.015 ng/mL / x     / x     / x     / x            BLOOD CULTURES:   LIPID PROFILE     RADIOLOGY:  CXR: 18:  The lungs are clear. The heart size is normal.   [place] sutures noted along the left greater trochanter on the.   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  Clear lungs.       EK18:  Normal sinus rhythm  Possible Left atrial enlargement  When compared with ECG of 2017 21:23, Premature atrial complexes are no longer Present    TELEMETRY:    18:  NSR, Sinus bradycardia  18:  NSR/sinus bradycardia while sleeping      ECHO:

## 2018-07-08 NOTE — PROGRESS NOTE ADULT - SUBJECTIVE AND OBJECTIVE BOX
PMD - dr wing  EP - Dr. Booth     History of Present Illness:  Reason for Admission: I almost passed out\    History of Present Illness:   pt is 82 yo female with PMH of RA, ablation for arrythmia with dr booth about a year ago and now presents with dizziness and almost passing out last night.  pt states around 9 pm she felt the same flutter type of sensation she had prior to having her ablation last year with dr booth and this was associated with lightheadedness and chest pressure and some SOB.  she went and laid down immediately but did not loose consciousness.  she was seen in the ED 6/30 for palpitations and sent home with rec for outpt f/u, holter and had followed up in dr mclaughlin office and had just resumed normal activities when events occurred.    7/8 - feels well.  no events on tele    Physical Exam: GEN: lying in bed, NAD  	HEENT:   NC/AT, pupils equal and reactive, EOMI  	CV:  +S1, +S2, RRR  	RESP:   lungs clear to auscultation bilaterally, no wheeze, rales, rhonchi   	BREAST:  not examined  	GI:  abdomen soft, non-tender, non-distended, normoactive BS  	RECTAL:  not examined  	:  not examined  	MSK:   normal muscle tone  	EXT:  no edema, changes c/w RA  	NEURO:  AAOX3, no focal neurological deficits  SKIN:  no rashes                              13.4   7.00  )-----------( 178      ( 07 Jul 2018 00:59 )             38.6     07-08    142  |  108  |  15  ----------------------------<  89  4.0   |  23  |  0.66    Ca    9.1      08 Jul 2018 06:22    TPro  7.1  /  Alb  2.8<L>  /  TBili  0.3  /  DBili  x   /  AST  22  /  ALT  16  /  AlkPhos  82  07-07    CARDIAC MARKERS ( 07 Jul 2018 19:32 )  <0.015 ng/mL / x     / 39 U/L / x     / x      CARDIAC MARKERS ( 07 Jul 2018 11:21 )  <0.015 ng/mL / x     / 42 U/L / x     / x      CARDIAC MARKERS ( 07 Jul 2018 04:50 )  <0.015 ng/mL / x     / x     / x     / x      CARDIAC MARKERS ( 07 Jul 2018 00:59 )  <0.015 ng/mL / x     / x     / x     / x          LIVER FUNCTIONS - ( 07 Jul 2018 00:59 )  Alb: 2.8 g/dL / Pro: 7.1 gm/dL / ALK PHOS: 82 U/L / ALT: 16 U/L / AST: 22 U/L / GGT: x           PT/INR - ( 07 Jul 2018 00:59 )   PT: 11.6 sec;   INR: 1.07 ratio         PTT - ( 07 Jul 2018 00:59 )  PTT:28.7 sec        	EKG - NSR no st-t changes indic of ischemia    Assessment and Plan:    Assessment:  · Assessment	  pt is 82 yo female with PMH as per HPI here with near syncope    # near syncope - likely cardiogenic given patients hx and given same sx last time prior to ablation, possible she was dehydrated and orthostatic with near syncope  - cardio input appreacited and will await EP eval  - continue metoprolol, aspirin   - trop negative   - will check orthostatics  - urged pt to stay well hydrated during summer    # RA - pt no longer recieving treatment.    - outpt f/u with dr macedo.  states she recieved last dose of arencia in january    dvt proph - lovenox

## 2018-07-09 RX ADMIN — GABAPENTIN 300 MILLIGRAM(S): 400 CAPSULE ORAL at 21:23

## 2018-07-09 RX ADMIN — Medication 100 MILLIGRAM(S): at 05:51

## 2018-07-09 RX ADMIN — Medication 50 MILLIGRAM(S): at 05:52

## 2018-07-09 RX ADMIN — GABAPENTIN 300 MILLIGRAM(S): 400 CAPSULE ORAL at 14:23

## 2018-07-09 RX ADMIN — Medication 650 MILLIGRAM(S): at 20:39

## 2018-07-09 RX ADMIN — Medication 100 MILLIGRAM(S): at 21:24

## 2018-07-09 RX ADMIN — Medication 50 MICROGRAM(S): at 05:52

## 2018-07-09 RX ADMIN — GABAPENTIN 300 MILLIGRAM(S): 400 CAPSULE ORAL at 05:52

## 2018-07-09 RX ADMIN — Medication 650 MILLIGRAM(S): at 11:39

## 2018-07-09 RX ADMIN — ATORVASTATIN CALCIUM 20 MILLIGRAM(S): 80 TABLET, FILM COATED ORAL at 21:23

## 2018-07-09 RX ADMIN — Medication 81 MILLIGRAM(S): at 11:39

## 2018-07-09 NOTE — CONSULT NOTE ADULT - SUBJECTIVE AND OBJECTIVE BOX
CARDIOLOGY AND ELECTROPHYSIOLOGY ASSESSMENT    HPI:  pt is 82 yo female with PMH of RA, ablation for arrythmia with dr galo about a year ago and now presents with dizziness and almost passing out last night.  pt states around 9 pm she felt the same flutter type of sensation she had prior to a slow pathway modification performed on 16 and this was associated with lightheadedness and chest pressure and some SOB.  She went and laid down immediately but did not loose consciousness.  She was seen in the ED  for palpitations and sent home with rec for outpt f/u, holter and had followed up in dr mclaughlin office and had just resumed normal activities when events occurred.  Since arriving to the ED she has not  had any further episodes and has been sinus briseyda on tele monitor.    no diaphoresis, fever, chills.    PMH - as per HPI  PSH - as per HPI   FH - mother - CAD  at age 93, father  at age 77 with hx of RA  SH - hx of tob use and smoked til 8 years ago.  20+ PY hx, occ ETOH use and denies drug use  lives alone at home (2018 10:13)      PAST MEDICAL & SURGICAL HISTORY:  Rheumatoid aortitis      MEDICATIONS  (STANDING):  aspirin enteric coated 81 milliGRAM(s) Oral daily  atorvastatin 20 milliGRAM(s) Oral at bedtime  docusate sodium 100 milliGRAM(s) Oral three times a day  enoxaparin Injectable 40 milliGRAM(s) SubCutaneous every 24 hours  gabapentin 300 milliGRAM(s) Oral three times a day  levothyroxine 50 MICROGram(s) Oral daily  metoprolol tartrate 50 milliGRAM(s) Oral every 12 hours  raloxifene 60 milliGRAM(s) Oral daily  ranitidine  Oral Tab/Cap - Peds 150 milliGRAM(s) Oral three times a day  sodium chloride 0.9%. 1500 milliLiter(s) (75 mL/Hr) IV Continuous <Continuous>    MEDICATIONS  (PRN):  acetaminophen   Tablet 650 milliGRAM(s) Oral every 6 hours PRN For Temp greater than 38 C (100.4 F)  acetaminophen   Tablet. 650 milliGRAM(s) Oral every 6 hours PRN Mild Pain (1 - 3)      Allergies    contrast media (iodine-based) (Unknown)  penicillins (Unknown)  sulfa drugs (Unknown)    Intolerances        SOCIAL HISTORY: Denies tobacco, etoh abuse or illicit drug use    FAMILY HISTORY:      Vital Signs Last 24 Hrs  T(C): 36.6 (2018 04:44), Max: 36.6 (2018 04:44)  T(F): 97.9 (2018 04:44), Max: 97.9 (2018 04:44)  HR: 66 (2018 04:44) (61 - 66)  BP: 143/75 (2018 04:44) (143/45 - 166/78)  BP(mean): --  RR: --  SpO2: 95% (2018 04:44) (95% - 95%)    REVIEW OF SYSTEMS:    CONSTITUTIONAL:  As per HPI.  HEENT:  Eyes:  No diplopia or blurred vision. ENT:  No earache, sore throat or runny nose.  CARDIOVASCULAR:  No pressure, squeezing, strangling, tightness, heaviness or aching about the chest, neck, axilla or epigastrium.  RESPIRATORY:  No cough, shortness of breath, PND or orthopnea.  GASTROINTESTINAL:  No nausea, vomiting or diarrhea.  GENITOURINARY:  No dysuria, frequency or urgency.  MUSCULOSKELETAL:  As per HPI.  SKIN:  No change in skin, hair or nails.  NEUROLOGIC:  No paresthesias, fasciculations, seizures or weakness.  PSYCHIATRIC:  No disorder of thought or mood.  ENDOCRINE:  No heat or cold intolerance, polyuria or polydipsia.  HEMATOLOGICAL:  No easy bruising or bleedings:  .     PHYSICAL EXAMINATION:    GENERAL APPEARANCE:  Pt. is not currently dyspneic, in no distress. Pt. is alert, oriented, and pleasant.  HEENT:  Pupils are normal and react normally. No icterus. Mucous membranes well colored.  NECK:  Supple. No lymphadenopathy. Jugular venous pressure not elevated. Carotids equal.   HEART:   The cardiac impulse has a normal quality. There are no murmurs, rubs or gallops noted  CHEST:  Chest is clear to auscultation. Normal respiratory effort.  ABDOMEN:  Soft and nontender.   EXTREMITIES:  There is no edema.   SKIN:  No rash or significant lesions are noted.    I&O's Summary    2018 07:01  -  2018 07:00  --------------------------------------------------------  IN: 960 mL / OUT: 600 mL / NET: 360 mL        LABS:      142  |  108  |  15  ----------------------------<  89  4.0   |  23  |  0.66    Ca    9.1      2018 06:22      CARDIAC MARKERS ( 2018 19:32 )  <0.015 ng/mL / x     / 39 U/L / x     / x      CARDIAC MARKERS ( 2018 11:21 )  <0.015 ng/mL / x     / 42 U/L / x     / x          EKG:    < from: 12 Lead ECG (18 @ 19:56) >  Ventricular Rate 67 BPM    Atrial Rate 67 BPM    P-R Interval 132 ms    QRS Duration 94 ms    Q-T Interval 410 ms    QTC Calculation(Bezet) 433 ms    P Axis 49 degrees    R Axis 0 degrees    T Axis 16 degrees    Diagnosis Line Normal sinus rhythm  Possible Left atrial enlargement  Borderline ECG  When compared with ECG of 2017 21:23,  Premature atrial complexes are no longer Present  Confirmed by FRANCIE LONGORIA, DELILAH ([id number]) on 2018 2:37:15 PM    < end of copied text >      TELEMETRY:    Sinus Bradycardia

## 2018-07-09 NOTE — PROGRESS NOTE ADULT - SUBJECTIVE AND OBJECTIVE BOX
PMD - dr wing  EP - Dr. Booth    History of Present Illness:  Reason for Admission: I almost passed out    History of Present Illness:   pt is 84 yo female with PMH of RA, ablation for arrythmia with dr booth about a year ago and now presents with dizziness and almost passing out last night.  pt states around 9 pm she felt the same flutter type of sensation she had prior to having her ablation last year with dr booth and this was associated with lightheadedness and chest pressure and some SOB.  she went and laid down immediately but did not loose consciousness.  she was seen in the ED 6/30 for palpitations and sent home with rec for outpt f/u, holter and had followed up in dr mclaughlin office and had just resumed normal activities when events occurred.    7/8 - feels well.  no events on tele  7/9- Feels Well    ICU Vital Signs Last 24 Hrs  T(C): 36.4 (09 Jul 2018 11:04), Max: 36.6 (09 Jul 2018 04:44)  T(F): 97.6 (09 Jul 2018 11:04), Max: 97.9 (09 Jul 2018 04:44)  HR: 58 (09 Jul 2018 11:04) (58 - 66)  BP: 139/74 (09 Jul 2018 11:04) (139/74 - 166/78)  BP(mean): --  ABP: --  ABP(mean): --  RR: 18 (09 Jul 2018 11:04) (18 - 18)  SpO2: 96% (09 Jul 2018 11:04) (95% - 96%)    Physical Exam:   GEN: lying in bed, NAD  HEENT:   NC/AT, pupils equal and reactive, EOMI  CV:  +S1, +S2, RRR  RESP:   lungs clear to auscultation bilaterally, no wheeze, rales, rhonchi   BREAST:  not examined  GI:  abdomen soft, non-tender, non-distended, normoactive BS  RECTAL:  not examined  :  not examined  MSK:   normal muscle tone  EXT:  no edema, changes c/w RA  NEURO:  AAOX3, no focal neurological deficits  SKIN:  no rashes          07-08    142  |  108  |  15  ----------------------------<  89  4.0   |  23  |  0.66    Ca    9.1      08 Jul 2018 06:22      CARDIAC MARKERS ( 07 Jul 2018 19:32 )  <0.015 ng/mL / x     / 39 U/L / x     / x          < from: 12 Lead ECG (07.06.18 @ 23:54) >  Ventricular Rate 76 BPM    Atrial Rate 76 BPM    P-R Interval 140 ms    QRS Duration 96 ms    Q-T Interval 408 ms    QTC Calculation(Bezet) 459 ms    P Axis 68 degrees    R Axis -6 degrees    T Axis 35 degrees    Diagnosis Line Normal sinus rhythm  Possible Left atrial enlargement  Borderline ECG  When compared with ECG of 30-JUN-2018 19:56,  No significant change was found  Confirmed by GENARO LONGORIA, IVANNA FORD ([id number]) on 7/9/2018 8:49:58 AM    < end of copied text >            Assessment and Plan:      Assessment	  pt is 84 yo female with PMH as per HPI here with near syncope    # near syncope - likely cardiogenic given patients hx and given same sx last time prior to ablation, possible she was dehydrated and orthostatic with near syncope  - cardio input appreacited and will await EP eval  - continue metoprolol, aspirin   - trop negative   - will check orthostatics  - urged pt to stay well hydrated during summer    # RA - pt no longer recieving treatment.    - outpt f/u with dr macedo.  states she recieved last dose of arencia in january    dvt proph - lovenox PMD - dr wing  EP - Dr. Booth    History of Present Illness:  Reason for Admission: I almost passed out    History of Present Illness:   pt is 82 yo female with PMH of RA, ablation for arrythmia with dr booth about a year ago and now presents with dizziness and almost passing out last night.  pt states around 9 pm she felt the same flutter type of sensation she had prior to having her ablation last year with dr booth and this was associated with lightheadedness and chest pressure and some SOB.  she went and laid down immediately but did not loose consciousness.  she was seen in the ED 6/30 for palpitations and sent home with rec for outpt f/u, holter and had followed up in dr mclaughlin office and had just resumed normal activities when events occurred.    7/8 - feels well.  no events on tele  7/9- c/o pain in her hand 2/2 arthritis but improved with tylenol this AM.  pt agreeable to EP procedure on wed with dr booth    ICU Vital Signs Last 24 Hrs  T(C): 36.4 (09 Jul 2018 11:04), Max: 36.6 (09 Jul 2018 04:44)  T(F): 97.6 (09 Jul 2018 11:04), Max: 97.9 (09 Jul 2018 04:44)  HR: 58 (09 Jul 2018 11:04) (58 - 66)  BP: 139/74 (09 Jul 2018 11:04) (139/74 - 166/78)  BP(mean): --  ABP: --  ABP(mean): --  RR: 18 (09 Jul 2018 11:04) (18 - 18)  SpO2: 96% (09 Jul 2018 11:04) (95% - 96%)    Physical Exam:   GEN: lying in bed, NAD  HEENT:   NC/AT, pupils equal and reactive, EOMI  CV:  +S1, +S2, RRR  RESP:   lungs clear to auscultation bilaterally, no wheeze, rales, rhonchi   BREAST:  not examined  GI:  abdomen soft, non-tender, non-distended, normoactive BS  RECTAL:  not examined  :  not examined  MSK:   normal muscle tone  EXT:  no edema, changes c/w RA  NEURO:  AAOX3, no focal neurological deficits  SKIN:  no rashes          07-08    142  |  108  |  15  ----------------------------<  89  4.0   |  23  |  0.66    Ca    9.1      08 Jul 2018 06:22      CARDIAC MARKERS ( 07 Jul 2018 19:32 )  <0.015 ng/mL / x     / 39 U/L / x     / x          < from: 12 Lead ECG (07.06.18 @ 23:54) >  Ventricular Rate 76 BPM    Atrial Rate 76 BPM    P-R Interval 140 ms    QRS Duration 96 ms    Q-T Interval 408 ms    QTC Calculation(Bezet) 459 ms    P Axis 68 degrees    R Axis -6 degrees    T Axis 35 degrees    Diagnosis Line Normal sinus rhythm  Possible Left atrial enlargement  Borderline ECG  When compared with ECG of 30-JUN-2018 19:56,  No significant change was found  Confirmed by GENARO LONGORIA, IVANNA FORD ([id number]) on 7/9/2018 8:49:58 AM    < end of copied text >            Assessment and Plan:      Assessment	  pt is 82 yo female with PMH as per HPI here with near syncope    # near syncope - likely cardiogenic given patients hx and given same sx last time prior to ablation, possible she was dehydrated and orthostatic with near syncope  - cardio input appreacited and case d/w EP and procedure planned for wed  - continue metoprolol, aspirin   - trop negative   - will check orthostatics  - urged pt to stay well hydrated during summer    # RA - pt no longer recieving treatment.    - outpt f/u with dr macedo.  states she recieved last dose of arencia in january  - tylenol prn pain     dvt proph - lovenox

## 2018-07-09 NOTE — CONSULT NOTE ADULT - ASSESSMENT
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  Of note, ablation was difficult and required many lesions making recurrence more likely.

## 2018-07-09 NOTE — PROGRESS NOTE ADULT - SUBJECTIVE AND OBJECTIVE BOX
CHIEF COMPLAINT:  Patient is a 83y old  Female who presents with a chief complaint of I almost passed out (2018 10:13)      HPI:  18:  pt is 84 yo female with PMH of RA (followed by Dr Mccray), hypothyroidism and ablation for SVT arrhythmias with Dr Booth over a year ago and now presents with dizziness and almost passing out last night.   She states around 9 pm she felt the same flutter type of sensation she had prior to having her ablation last year with Dr Booth and this was associated with lightheadedness and chest pressure and some SOB.   She went and laid down immediately but did not loose consciousness.  She was seen in the ED  for palpitations and sent home with rec for outpt f/u, holter and had followed up in our office and had just resumed normal activities when events occurred.  Since arriving to the ED she has not  had any further episodes and has been sinus briseyda on tele monitor.   She has no anginal chest pains, SOB, diaphoresis, fever or chills.  She has been using CBT oils for her RA with significant improvement in her hand pains and stiffness.      SOCIAL Hx:  hx of tobacco use and smoked til 8 years ago.  20+ pack yrs hx,   occ ETOH use and denies drug use  lives alone at home        PMHx:  PAST MEDICAL & SURGICAL HISTORY:  Rheumatoid arthritis-Dr Mccray  SVT ablation-Dr Booth  Hypothyroidism      FAMILY HISTORY:   FAMILY HISTORY:  Mother - CAD  at age 93, father  at age 77 with hx of RA      ALLERGIES:  Allergies  contrast media (iodine-based) (Unknown)  penicillins (Unknown)  sulfa drugs (Unknown)      REVIEW OF SYSTEMS:    CONSTITUTIONAL: No weakness, fevers or chills  EYES/ENT: No visual changes;  No vertigo or throat pain   NECK: No pain or stiffness  RESPIRATORY: No cough, wheezing, hemoptysis; No shortness of breath  CARDIOVASCULAR: No chest pain or palpitations  GASTROINTESTINAL: No abdominal or epigastric pain. No nausea, vomiting, or hematemesis; No diarrhea or constipation. No melena or hematochezia.  GENITOURINARY: No dysuria, frequency or hematuria  NEUROLOGICAL: No numbness or weakness  SKIN: No itching, burning, rashes, or lesions   All other review of systems is negative unless indicated above      ICU Vital Signs Last 24 Hrs  T(C): 36.4 (2018 11:04), Max: 36.6 (2018 04:44)  T(F): 97.6 (2018 11:04), Max: 97.9 (2018 04:44)  HR: 58 (2018 11:04) (58 - 66)  BP: 139/74 (2018 11:04) (139/74 - 166/78)  BP(mean): --  ABP: --  ABP(mean): --  RR: 18 (2018 11:04) (18 - 18)  SpO2: 96% (2018 11:04) (95% - 96%)        PHYSICAL EXAM:   Constitutional: NAD, awake and alert, well-developed  HEENT: PERR, EOMI, Normal Hearing, MMM  Neck: Soft and supple, No LAD, No JVD  Respiratory: Breath sounds are clear bilaterally, No wheezing, rales or rhonchi  Cardiovascular: S1 and S2, regular rate and rhythm, soft GABRIEL at LLSB and base as before, no gallops or rubs  Gastrointestinal: Bowel Sounds present, soft, nontender, nondistended, no guarding, no rebound  Extremities: No peripheral edema, 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  Vascular: 2+ peripheral pulses  Neurological: A/O x 3, no focal deficits  Musculoskeletal: 5/5 strength b/l upper and lower extremities  Skin: No rashes      MEDICATIONS  (STANDING):  aspirin enteric coated 81 milliGRAM(s) Oral daily  atorvastatin 20 milliGRAM(s) Oral at bedtime  docusate sodium 100 milliGRAM(s) Oral three times a day  enoxaparin Injectable 40 milliGRAM(s) SubCutaneous every 24 hours  gabapentin 300 milliGRAM(s) Oral three times a day  levothyroxine 50 MICROGram(s) Oral daily  raloxifene 60 milliGRAM(s) Oral daily  ranitidine  Oral Tab/Cap - Peds 150 milliGRAM(s) Oral three times a day  sodium chloride 0.9%. 1500 milliLiter(s) (75 mL/Hr) IV Continuous <Continuous>        LABS: All Labs Reviewed:                        13.4   7.00  )-----------( 178      ( 2018 00:59 )             38.6     07-07    142  |  105  |  19  ----------------------------<  99  3.7   |  29  |  1.17    Ca    9.5      2018 00:59    TPro  7.1  /  Alb  2.8<L>  /  TBili  0.3  /  DBili  x   /  AST  22  /  ALT  16  /  AlkPhos  82      PT/INR - ( 2018 00:59 )   PT: 11.6 sec;   INR: 1.07 ratio         PTT - ( 2018 00:59 )  PTT:28.7 sec  CARDIAC MARKERS ( 2018 11:21 )  <0.015 ng/mL / x     / 42 U/L / x     / x      CARDIAC MARKERS ( 2018 04:50 )  <0.015 ng/mL / x     / x     / x     / x      CARDIAC MARKERS ( 2018 00:59 )  <0.015 ng/mL / x     / x     / x     / x            BLOOD CULTURES:   LIPID PROFILE     RADIOLOGY:  CXR: 18:  The lungs are clear. The heart size is normal.   [place] sutures noted along the left greater trochanter on the.   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  Clear lungs.       EK18:  Normal sinus rhythm  Possible Left atrial enlargement  When compared with ECG of 2017 21:23, Premature atrial complexes are no longer Present    TELEMETRY:  18:  NSR, Sinus bradycardia    ECHO:

## 2018-07-09 NOTE — CONSULT NOTE ADULT - PROBLEM SELECTOR RECOMMENDATION 9
- s/p slow pathway modification on 8/8/16 which was difficult requiring multiple lesions  - plan for EPS with possible ablation on wednesday.  Stop beta blockers for EPS  Pt. quoted 1% risk of complications including but not limited to CVA, MI, cardiac tamponade, pneumothorax, and death with a 3-4% risk of  bleeding, and infection.  Pt. indicated an understanding and agrees to proceed

## 2018-07-10 RX ORDER — OXYCODONE AND ACETAMINOPHEN 5; 325 MG/1; MG/1
1 TABLET ORAL EVERY 4 HOURS
Qty: 0 | Refills: 0 | Status: DISCONTINUED | OUTPATIENT
Start: 2018-07-10 | End: 2018-07-12

## 2018-07-10 RX ADMIN — Medication 81 MILLIGRAM(S): at 11:52

## 2018-07-10 RX ADMIN — GABAPENTIN 300 MILLIGRAM(S): 400 CAPSULE ORAL at 21:47

## 2018-07-10 RX ADMIN — RALOXIFENE HYDROCHLORIDE 60 MILLIGRAM(S): 60 TABLET, COATED ORAL at 11:52

## 2018-07-10 RX ADMIN — Medication 100 MILLIGRAM(S): at 05:46

## 2018-07-10 RX ADMIN — OXYCODONE AND ACETAMINOPHEN 1 TABLET(S): 5; 325 TABLET ORAL at 12:42

## 2018-07-10 RX ADMIN — Medication 650 MILLIGRAM(S): at 10:49

## 2018-07-10 RX ADMIN — OXYCODONE AND ACETAMINOPHEN 1 TABLET(S): 5; 325 TABLET ORAL at 11:52

## 2018-07-10 RX ADMIN — Medication 650 MILLIGRAM(S): at 10:07

## 2018-07-10 RX ADMIN — GABAPENTIN 300 MILLIGRAM(S): 400 CAPSULE ORAL at 13:17

## 2018-07-10 RX ADMIN — Medication 50 MICROGRAM(S): at 05:46

## 2018-07-10 RX ADMIN — ATORVASTATIN CALCIUM 20 MILLIGRAM(S): 80 TABLET, FILM COATED ORAL at 21:47

## 2018-07-10 RX ADMIN — GABAPENTIN 300 MILLIGRAM(S): 400 CAPSULE ORAL at 05:46

## 2018-07-10 RX ADMIN — Medication 100 MILLIGRAM(S): at 21:48

## 2018-07-10 NOTE — PROGRESS NOTE ADULT - SUBJECTIVE AND OBJECTIVE BOX
CHIEF COMPLAINT:  Patient is a 83y old  Female who presents with a chief complaint of I almost passed out (2018 10:13)      HPI:  18:  pt is 82 yo female with PMH of RA (followed by Dr Mccray), hypothyroidism and ablation for SVT arrhythmias with Dr Booth over a year ago and now presents with dizziness and almost passing out last night.   She states around 9 pm she felt the same flutter type of sensation she had prior to having her ablation last year with Dr Booth and this was associated with lightheadedness and chest pressure and some SOB.   She went and laid down immediately but did not loose consciousness.  She was seen in the ED  for palpitations and sent home with rec for outpt f/u, holter and had followed up in our office and had just resumed normal activities when events occurred.  Since arriving to the ED she has not  had any further episodes and has been sinus briseyda on tele monitor.   She has no anginal chest pains, SOB, diaphoresis, fever or chills.  She has been using CBT oils for her RA with significant improvement in her hand pains and stiffness.      SOCIAL Hx:  hx of tobacco use and smoked til 8 years ago.  20+ pack yrs hx,   occ ETOH use and denies drug use  lives alone at home        PMHx:  PAST MEDICAL & SURGICAL HISTORY:  Rheumatoid arthritis-Dr Mccray  SVT ablation-Dr Booth  Hypothyroidism      FAMILY HISTORY:   FAMILY HISTORY:  Mother - CAD  at age 93, father  at age 77 with hx of RA      ALLERGIES:  Allergies  contrast media (iodine-based) (Unknown)  penicillins (Unknown)  sulfa drugs (Unknown)      REVIEW OF SYSTEMS:    CONSTITUTIONAL: No weakness, fevers or chills  EYES/ENT: No visual changes;  No vertigo or throat pain   NECK: No pain or stiffness  RESPIRATORY: No cough, wheezing, hemoptysis; No shortness of breath  CARDIOVASCULAR: No chest pain or palpitations  GASTROINTESTINAL: No abdominal or epigastric pain. No nausea, vomiting, or hematemesis; No diarrhea or constipation. No melena or hematochezia.  GENITOURINARY: No dysuria, frequency or hematuria  NEUROLOGICAL: No numbness or weakness  SKIN: No itching, burning, rashes, or lesions   All other review of systems is negative unless indicated above      ICU Vital Signs Last 24 Hrs  T(C): 36.4 (2018 11:04), Max: 36.6 (2018 04:44)  T(F): 97.6 (2018 11:04), Max: 97.9 (2018 04:44)  HR: 58 (2018 11:04) (58 - 66)  BP: 139/74 (2018 11:04) (139/74 - 166/78)  BP(mean): --  ABP: --  ABP(mean): --  RR: 18 (2018 11:04) (18 - 18)  SpO2: 96% (2018 11:04) (95% - 96%)        PHYSICAL EXAM:   Constitutional: NAD, awake and alert, well-developed  HEENT: PERR, EOMI, Normal Hearing, MMM  Neck: Soft and supple, No LAD, No JVD  Respiratory: Breath sounds are clear bilaterally, No wheezing, rales or rhonchi  Cardiovascular: S1 and S2, regular rate and rhythm, soft GABRIEL at LLSB and base as before, no gallops or rubs  Gastrointestinal: Bowel Sounds present, soft, nontender, nondistended, no guarding, no rebound  Extremities: No peripheral edema, 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  Vascular: 2+ peripheral pulses  Neurological: A/O x 3, no focal deficits  Musculoskeletal: 5/5 strength b/l upper and lower extremities  Skin: No rashes      MEDICATIONS  (STANDING):  aspirin enteric coated 81 milliGRAM(s) Oral daily  atorvastatin 20 milliGRAM(s) Oral at bedtime  docusate sodium 100 milliGRAM(s) Oral three times a day  enoxaparin Injectable 40 milliGRAM(s) SubCutaneous every 24 hours  gabapentin 300 milliGRAM(s) Oral three times a day  levothyroxine 50 MICROGram(s) Oral daily  raloxifene 60 milliGRAM(s) Oral daily  ranitidine  Oral Tab/Cap - Peds 150 milliGRAM(s) Oral three times a day  sodium chloride 0.9%. 1500 milliLiter(s) (75 mL/Hr) IV Continuous <Continuous>        LABS: All Labs Reviewed:                        13.4   7.00  )-----------( 178      ( 2018 00:59 )             38.6     07-07    142  |  105  |  19  ----------------------------<  99  3.7   |  29  |  1.17    Ca    9.5      2018 00:59    TPro  7.1  /  Alb  2.8<L>  /  TBili  0.3  /  DBili  x   /  AST  22  /  ALT  16  /  AlkPhos  82      PT/INR - ( 2018 00:59 )   PT: 11.6 sec;   INR: 1.07 ratio         PTT - ( 2018 00:59 )  PTT:28.7 sec  CARDIAC MARKERS ( 2018 11:21 )  <0.015 ng/mL / x     / 42 U/L / x     / x      CARDIAC MARKERS ( 2018 04:50 )  <0.015 ng/mL / x     / x     / x     / x      CARDIAC MARKERS ( 2018 00:59 )  <0.015 ng/mL / x     / x     / x     / x            BLOOD CULTURES:   LIPID PROFILE     RADIOLOGY:  CXR: 18:  The lungs are clear. The heart size is normal.   [place] sutures noted along the left greater trochanter on the.   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  Clear lungs.       EK18:  Normal sinus rhythm  Possible Left atrial enlargement  When compared with ECG of 2017 21:23, Premature atrial complexes are no longer Present    TELEMETRY:  18:  NSR, Sinus bradycardia    ECHO:

## 2018-07-10 NOTE — PROGRESS NOTE ADULT - SUBJECTIVE AND OBJECTIVE BOX
PMD - dr wing  EP - Dr. Booth    History of Present Illness:  Reason for Admission: I almost passed out    History of Present Illness:   pt is 84 yo female with PMH of RA, ablation for arrythmia with dr booth about a year ago and now presents with dizziness and almost passing out last night.  pt states around 9 pm she felt the same flutter type of sensation she had prior to having her ablation last year with dr booth and this was associated with lightheadedness and chest pressure and some SOB.  she went and laid down immediately but did not loose consciousness.  she was seen in the ED 6/30 for palpitations and sent home with rec for outpt f/u, holter and had followed up in dr mclaughlin office and had just resumed normal activities when events occurred.    7/8 - feels well.  no events on tele  7/9- c/o pain in her hand 2/2 arthritis but improved with tylenol this AM.  pt agreeable to EP procedure on wed with dr booth  7/10- Feels well     ICU Vital Signs Last 24 Hrs  T(C): 36.6 (10 Jul 2018 11:36), Max: 36.7 (10 Jul 2018 04:50)  T(F): 97.8 (10 Jul 2018 11:36), Max: 98 (10 Jul 2018 04:50)  HR: 61 (10 Jul 2018 11:36) (59 - 67)  BP: 148/45 (10 Jul 2018 11:36) (116/41 - 172/84)  BP(mean): --  ABP: --  ABP(mean): --  RR: 17 (10 Jul 2018 11:36) (17 - 17)  SpO2: 94% (10 Jul 2018 11:36) (94% - 99%)    Physical Exam:   GEN: lying in bed, NAD  HEENT:   NC/AT, pupils equal and reactive, EOMI  CV:  +S1, +S2, RRR  RESP:   lungs clear to auscultation bilaterally, no wheeze, rales, rhonchi   BREAST:  not examined  GI:  abdomen soft, non-tender, non-distended, normoactive BS  RECTAL:  not examined  :  not examined  MSK:   normal muscle tone  EXT:  no edema, changes c/w RA  NEURO:  AAOX3, no focal neurological deficits  SKIN:  no rashes      Labs Reviewed       < from: 12 Lead ECG (07.06.18 @ 23:54) >  Ventricular Rate 76 BPM    Atrial Rate 76 BPM    P-R Interval 140 ms    QRS Duration 96 ms    Q-T Interval 408 ms    QTC Calculation(Bezet) 459 ms    P Axis 68 degrees    R Axis -6 degrees    T Axis 35 degrees    Diagnosis Line Normal sinus rhythm  Possible Left atrial enlargement  Borderline ECG  When compared with ECG of 30-JUN-2018 19:56,  No significant change was found  Confirmed by GENARO LONGORIA, IVANNA FORD ([id number]) on 7/9/2018 8:49:58 AM    < end of copied text >        Assessment and Plan:      Assessment	  pt is 84 yo female with PMH as per HPI here with near syncope    # near syncope - likely cardiogenic given patients hx and given same sx last time prior to ablation, possible she was dehydrated and orthostatic with near syncope  - cardio input appreciated and case d/w EP and procedure planned for wed  - continue metoprolol, aspirin   - trop negative   - will check orthostatics  - urged pt to stay well hydrated during summer    # RA - pt no longer recieving treatment.    - outpt f/u with dr macedo.  states she recieved last dose of arencia in january  - tylenol prn pain     dvt proph - lovenox PMD - dr wing  EP - Dr. Booth    History of Present Illness:  Reason for Admission: I almost passed out    History of Present Illness:   pt is 84 yo female with PMH of RA, ablation for arrythmia with dr booth about a year ago and now presents with dizziness and almost passing out last night.  pt states around 9 pm she felt the same flutter type of sensation she had prior to having her ablation last year with dr booth and this was associated with lightheadedness and chest pressure and some SOB.  she went and laid down immediately but did not loose consciousness.  she was seen in the ED 6/30 for palpitations and sent home with rec for outpt f/u, holter and had followed up in dr mclaughlin office and had just resumed normal activities when events occurred.    7/8 - feels well.  no events on tele  7/9- c/o pain in her hand 2/2 arthritis but improved with tylenol this AM.  pt agreeable to EP procedure on wed with dr booth  7/10- c/o arthritis pain this AM.  no cp, sob.  no events on tele.     ICU Vital Signs Last 24 Hrs  T(C): 36.6 (10 Jul 2018 11:36), Max: 36.7 (10 Jul 2018 04:50)  T(F): 97.8 (10 Jul 2018 11:36), Max: 98 (10 Jul 2018 04:50)  HR: 61 (10 Jul 2018 11:36) (59 - 67)  BP: 148/45 (10 Jul 2018 11:36) (116/41 - 172/84)  BP(mean): --  ABP: --  ABP(mean): --  RR: 17 (10 Jul 2018 11:36) (17 - 17)  SpO2: 94% (10 Jul 2018 11:36) (94% - 99%)    Physical Exam:   GEN: lying in bed, NAD  HEENT:   NC/AT, pupils equal and reactive, EOMI  CV:  +S1, +S2, RRR  RESP:   lungs clear to auscultation bilaterally, no wheeze, rales, rhonchi   BREAST:  not examined  GI:  abdomen soft, non-tender, non-distended, normoactive BS  RECTAL:  not examined  :  not examined  MSK:   normal muscle tone  EXT:  no edema, changes c/w RA  NEURO:  AAOX3, no focal neurological deficits  SKIN:  no rashes      Labs Reviewed       < from: 12 Lead ECG (07.06.18 @ 23:54) >  Ventricular Rate 76 BPM    Atrial Rate 76 BPM    P-R Interval 140 ms    QRS Duration 96 ms    Q-T Interval 408 ms    QTC Calculation(Bezet) 459 ms    P Axis 68 degrees    R Axis -6 degrees    T Axis 35 degrees    Diagnosis Line Normal sinus rhythm  Possible Left atrial enlargement  Borderline ECG  When compared with ECG of 30-JUN-2018 19:56,  No significant change was found  Confirmed by GENARO LONGORIA, IVANNA FORD ([id number]) on 7/9/2018 8:49:58 AM    < end of copied text >        Assessment and Plan:      Assessment	  pt is 84 yo female with PMH as per HPI here with near syncope    # near syncope - likely cardiogenic given patients hx and given same sx last time prior to ablation, possible she was dehydrated and orthostatic with near syncope  - cardio input appreciated and case d/w EP and procedure planned for wed  - contineu aspirin.  metoprolol stopped in anticipation of EP procedure.    - trop negative   - will check orthostatics  - urged pt to stay well hydrated during summer    # HTN - stop IVF and now elevated since BB help for EP procedure    # RA - pt no longer recieving treatment.    - outpt f/u with dr macedo.  states she recieved last dose of arencia in january  - tylenol and oxycodone prn pain     dvt proph - lovenox

## 2018-07-11 DIAGNOSIS — Z90.49 ACQUIRED ABSENCE OF OTHER SPECIFIED PARTS OF DIGESTIVE TRACT: Chronic | ICD-10-CM

## 2018-07-11 DIAGNOSIS — H25.093 OTHER AGE-RELATED INCIPIENT CATARACT, BILATERAL: Chronic | ICD-10-CM

## 2018-07-11 PROCEDURE — 93010 ELECTROCARDIOGRAM REPORT: CPT

## 2018-07-11 RX ORDER — ISOPROTERENOL HYDROCHLORIDE 1 MG/5ML
1 INJECTION, SOLUTION INTRACARDIAC; INTRAMUSCULAR; INTRAVENOUS; SUBCUTANEOUS
Qty: 1 | Refills: 0 | Status: DISCONTINUED | OUTPATIENT
Start: 2018-07-11 | End: 2018-07-12

## 2018-07-11 RX ADMIN — GABAPENTIN 300 MILLIGRAM(S): 400 CAPSULE ORAL at 22:09

## 2018-07-11 RX ADMIN — Medication 650 MILLIGRAM(S): at 14:00

## 2018-07-11 RX ADMIN — Medication 100 MILLIGRAM(S): at 22:08

## 2018-07-11 RX ADMIN — Medication 20 MILLIGRAM(S): at 22:08

## 2018-07-11 RX ADMIN — Medication 650 MILLIGRAM(S): at 07:07

## 2018-07-11 RX ADMIN — ATORVASTATIN CALCIUM 20 MILLIGRAM(S): 80 TABLET, FILM COATED ORAL at 22:08

## 2018-07-11 RX ADMIN — RALOXIFENE HYDROCHLORIDE 60 MILLIGRAM(S): 60 TABLET, COATED ORAL at 11:20

## 2018-07-11 RX ADMIN — Medication 100 MILLIGRAM(S): at 05:35

## 2018-07-11 RX ADMIN — Medication 650 MILLIGRAM(S): at 13:33

## 2018-07-11 RX ADMIN — Medication 50 MICROGRAM(S): at 05:35

## 2018-07-11 RX ADMIN — GABAPENTIN 300 MILLIGRAM(S): 400 CAPSULE ORAL at 13:31

## 2018-07-11 RX ADMIN — Medication 650 MILLIGRAM(S): at 08:30

## 2018-07-11 RX ADMIN — GABAPENTIN 300 MILLIGRAM(S): 400 CAPSULE ORAL at 05:35

## 2018-07-11 RX ADMIN — Medication 650 MILLIGRAM(S): at 22:26

## 2018-07-11 RX ADMIN — Medication 650 MILLIGRAM(S): at 23:00

## 2018-07-11 RX ADMIN — Medication 81 MILLIGRAM(S): at 11:20

## 2018-07-11 NOTE — PROGRESS NOTE ADULT - SUBJECTIVE AND OBJECTIVE BOX
CHIEF COMPLAINT:  Patient is a 83y old  Female who presents with a chief complaint of I almost passed out (2018 10:13)      HPI:  18:  pt is 84 yo female with PMH of RA (followed by Dr Mccray), hypothyroidism and ablation for SVT arrhythmias with Dr Booth over a year ago and now presents with dizziness and almost passing out last night.   She states around 9 pm she felt the same flutter type of sensation she had prior to having her ablation last year with Dr Booth and this was associated with lightheadedness and chest pressure and some SOB.   She went and laid down immediately but did not loose consciousness.  She was seen in the ED  for palpitations and sent home with rec for outpt f/u, holter and had followed up in our office and had just resumed normal activities when events occurred.  Since arriving to the ED she has not  had any further episodes and has been sinus briseyda on tele monitor.   She has no anginal chest pains, SOB, diaphoresis, fever or chills.  She has been using CBT oils for her RA with significant improvement in her hand pains and stiffness.    18:  Patient is for an EP study today      SOCIAL Hx:  hx of tobacco use and smoked til 8 years ago.  20+ pack yrs hx,   occ ETOH use and denies drug use  lives alone at home        PMHx:  PAST MEDICAL & SURGICAL HISTORY:  Rheumatoid arthritis-Dr Mccray  SVT ablation-Dr Booth  Hypothyroidism      FAMILY HISTORY:   FAMILY HISTORY:  Mother - CAD  at age 93, father  at age 77 with hx of RA      ALLERGIES:  Allergies  contrast media (iodine-based) (Unknown)  penicillins (Unknown)  sulfa drugs (Unknown)      REVIEW OF SYSTEMS:    CONSTITUTIONAL: No weakness, fevers or chills  EYES/ENT: No visual changes;  No vertigo or throat pain   NECK: No pain or stiffness  RESPIRATORY: No cough, wheezing, hemoptysis; No shortness of breath  CARDIOVASCULAR: No chest pain or palpitations  GASTROINTESTINAL: No abdominal or epigastric pain. No nausea, vomiting, or hematemesis; No diarrhea or constipation. No melena or hematochezia.  GENITOURINARY: No dysuria, frequency or hematuria  NEUROLOGICAL: No numbness or weakness  SKIN: No itching, burning, rashes, or lesions   All other review of systems is negative unless indicated above      ICU Vital Signs Last 24 Hrs  T(C): 36.8 (2018 04:53), Max: 37 (10 Jul 2018 16:28)  T(F): 98.3 (2018 04:53), Max: 98.6 (10 Jul 2018 16:28)  HR: 66 (2018 04:53) (61 - 66)  BP: 127/61 (2018 04:53) (113/53 - 148/45)  BP(mean): --  ABP: --  ABP(mean): --  RR: 17 (10 Jul 2018 11:36) (17 - 17)  SpO2: 96% (2018 04:53) (94% - 98%)          PHYSICAL EXAM:   Constitutional: NAD, awake and alert, well-developed  HEENT: PERR, EOMI, Normal Hearing, MMM  Neck: Soft and supple, No LAD, No JVD  Respiratory: Breath sounds are clear bilaterally, No wheezing, rales or rhonchi  Cardiovascular: S1 and S2, regular rate and rhythm, soft GABRIEL at LLSB and base as before, no gallops or rubs  Gastrointestinal: Bowel Sounds present, soft, nontender, nondistended, no guarding, no rebound  Extremities: No peripheral edema, 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  Vascular: 2+ peripheral pulses  Neurological: A/O x 3, no focal deficits  Musculoskeletal: 5/5 strength b/l upper and lower extremities  Skin: No rashes      MEDICATIONS  (STANDING):  aspirin enteric coated 81 milliGRAM(s) Oral daily  atorvastatin 20 milliGRAM(s) Oral at bedtime  docusate sodium 100 milliGRAM(s) Oral three times a day  enoxaparin Injectable 40 milliGRAM(s) SubCutaneous every 24 hours  gabapentin 300 milliGRAM(s) Oral three times a day  levothyroxine 50 MICROGram(s) Oral daily  raloxifene 60 milliGRAM(s) Oral daily  ranitidine  Oral Tab/Cap - Peds 150 milliGRAM(s) Oral three times a day          LABS: All Labs Reviewed:                        13.4   7.00  )-----------( 178      ( 2018 00:59 )             38.6     07-07    142  |  105  |  19  ----------------------------<  99  3.7   |  29  |  1.17    Ca    9.5      2018 00:59    TPro  7.1  /  Alb  2.8<L>  /  TBili  0.3  /  DBili  x   /  AST  22  /  ALT  16  /  AlkPhos  82      PT/INR - ( 2018 00:59 )   PT: 11.6 sec;   INR: 1.07 ratio         PTT - ( 2018 00:59 )  PTT:28.7 sec  CARDIAC MARKERS ( 2018 11:21 )  <0.015 ng/mL / x     / 42 U/L / x     / x      CARDIAC MARKERS ( 2018 04:50 )  <0.015 ng/mL / x     / x     / x     / x      CARDIAC MARKERS ( 2018 00:59 )  <0.015 ng/mL / x     / x     / x     / x            BLOOD CULTURES:   LIPID PROFILE     RADIOLOGY:  CXR: 18:  The lungs are clear. The heart size is normal.   [place] sutures noted along the left greater trochanter on the.   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  Clear lungs.       EK18:  Normal sinus rhythm  Possible Left atrial enlargement  When compared with ECG of 2017 21:23, Premature atrial complexes are no longer Present    TELEMETRY:  18:  NSR, Sinus bradycardia    ECHO:

## 2018-07-11 NOTE — PROGRESS NOTE ADULT - SUBJECTIVE AND OBJECTIVE BOX
CC: I almost passed out    History of Present Illness:   pt is 84 yo female with PMH of RA, ablation for arrythmia with dr galo about a year ago and now presents with dizziness and almost passing out last night.  pt states around 9 pm she felt the same flutter type of sensation she had prior to having her ablation last year with dr galo and this was associated with lightheadedness and chest pressure and some SOB.  she went and laid down immediately but did not loose consciousness.  she was seen in the ED 6/30 for palpitations and sent home with rec for outpt f/u, holter and had followed up in dr mclaughlin office and had just resumed normal activities when events occurred.    7/8 - feels well.  no events on tele  7/9- c/o pain in her hand 2/2 arthritis but improved with tylenol this AM.  pt agreeable to EP procedure on wed with dr galo  7/10- c/o arthritis pain this AM.  no cp, sob.  no events on tele.   7/11: arthritic pain throughout body still severe without relief from tylenol.  Pt awaiting EP study today.    Vital Signs Last 24 Hrs  T(C): 36.8 (11 Jul 2018 10:42), Max: 37 (10 Jul 2018 16:28)  T(F): 98.3 (11 Jul 2018 10:42), Max: 98.6 (10 Jul 2018 16:28)  HR: 68 (11 Jul 2018 10:42) (64 - 68)  BP: 150/79 (11 Jul 2018 10:42) (113/53 - 150/79)  BP(mean): --  RR: 18 (11 Jul 2018 10:42) (18 - 18)  SpO2: 97% (11 Jul 2018 10:42) (96% - 98%)    Physical Exam:   GEN: lying in bed, NAD  HEENT:   NC/AT, pupils equal and reactive, EOMI  CV:  +S1, +S2, RRR  RESP:   lungs clear to auscultation bilaterally, no wheeze, rales, rhonchi   BREAST:  not examined  GI:  abdomen soft, non-tender, non-distended, normoactive BS  RECTAL:  not examined  :  not examined  MSK:   significant ulnar deviation of b/l hands, mild edema, + tenderness  EXT: changes c/w RA  NEURO:  AAOX3, no focal neurological deficits  SKIN:  no rashes    MEDICATIONS  (STANDING):  aspirin enteric coated 81 milliGRAM(s) Oral daily  atorvastatin 20 milliGRAM(s) Oral at bedtime  docusate sodium 100 milliGRAM(s) Oral three times a day  enoxaparin Injectable 40 milliGRAM(s) SubCutaneous every 24 hours  gabapentin 300 milliGRAM(s) Oral three times a day  levothyroxine 50 MICROGram(s) Oral daily  predniSONE   Tablet 20 milliGRAM(s) Oral daily  raloxifene 60 milliGRAM(s) Oral daily  ranitidine  Oral Tab/Cap - Peds 150 milliGRAM(s) Oral three times a day    MEDICATIONS  (PRN):  acetaminophen   Tablet 650 milliGRAM(s) Oral every 6 hours PRN For Temp greater than 38 C (100.4 F)  acetaminophen   Tablet. 650 milliGRAM(s) Oral every 6 hours PRN Mild Pain (1 - 3)  oxyCODONE    5 mG/acetaminophen 325 mG 1 Tablet(s) Oral every 4 hours PRN Moderate Pain (4 - 6)        CAPILLARY BLOOD GLUCOSE      Assessment and Plan:  84 yo female with PMH as per HPI here with near syncope    Near syncope: Etiology unclear, possible cardiogenic vs dehydration and orthostatisis.  - cardio input --> for EP study today.  - continue aspirin.    - metoprolol stopped in anticipation of EP procedure.    - trops negative   - chest xray no acute pathology  - check orthostatics  - s/p IVFs, urged pt to stay well hydrated during summer    HTN   - stop IVF and now elevated since BB help for EP procedure    RA: Appears to be in an acute flare  - pt no longer receiving treatment.   - start prednisone 20mg qd and taper on discharge with outpt f/u with dr macedo.  states she recieved last dose of arencia in january.  - PT eval  - f/u inflammatory markers for AM  - tylenol and oxycodone prn pain     DVT proph   - lovenox

## 2018-07-11 NOTE — PACU DISCHARGE NOTE - COMMENTS
Pt's remote monitor paired and instructions given to pt's son Anson by [facility] representative Willis Tran, Report given to nurse Che, confirmed with Juan Jose hernandez tech pt on monitor nsr in the 80's pt transferred in stable condition accompany by staff with belongings.

## 2018-07-12 ENCOUNTER — TRANSCRIPTION ENCOUNTER (OUTPATIENT)
Age: 83
End: 2018-07-12

## 2018-07-12 VITALS
SYSTOLIC BLOOD PRESSURE: 112 MMHG | HEART RATE: 70 BPM | DIASTOLIC BLOOD PRESSURE: 57 MMHG | TEMPERATURE: 98 F | OXYGEN SATURATION: 97 % | RESPIRATION RATE: 18 BRPM

## 2018-07-12 LAB
ANION GAP SERPL CALC-SCNC: 9 MMOL/L — SIGNIFICANT CHANGE UP (ref 5–17)
BUN SERPL-MCNC: 13 MG/DL — SIGNIFICANT CHANGE UP (ref 7–23)
CALCIUM SERPL-MCNC: 9.4 MG/DL — SIGNIFICANT CHANGE UP (ref 8.5–10.1)
CHLORIDE SERPL-SCNC: 107 MMOL/L — SIGNIFICANT CHANGE UP (ref 96–108)
CO2 SERPL-SCNC: 24 MMOL/L — SIGNIFICANT CHANGE UP (ref 22–31)
CREAT SERPL-MCNC: 0.7 MG/DL — SIGNIFICANT CHANGE UP (ref 0.5–1.3)
CRP SERPL-MCNC: 0.83 MG/DL — HIGH (ref 0–0.4)
ERYTHROCYTE [SEDIMENTATION RATE] IN BLOOD: 39 MM/HR — HIGH (ref 0–20)
GLUCOSE SERPL-MCNC: 125 MG/DL — HIGH (ref 70–99)
HCT VFR BLD CALC: 40.7 % — SIGNIFICANT CHANGE UP (ref 34.5–45)
HGB BLD-MCNC: 14 G/DL — SIGNIFICANT CHANGE UP (ref 11.5–15.5)
MCHC RBC-ENTMCNC: 31.3 PG — SIGNIFICANT CHANGE UP (ref 27–34)
MCHC RBC-ENTMCNC: 34.4 GM/DL — SIGNIFICANT CHANGE UP (ref 32–36)
MCV RBC AUTO: 90.8 FL — SIGNIFICANT CHANGE UP (ref 80–100)
NRBC # BLD: 0 /100 WBCS — SIGNIFICANT CHANGE UP (ref 0–0)
PLATELET # BLD AUTO: 174 K/UL — SIGNIFICANT CHANGE UP (ref 150–400)
POTASSIUM SERPL-MCNC: 4.2 MMOL/L — SIGNIFICANT CHANGE UP (ref 3.5–5.3)
POTASSIUM SERPL-SCNC: 4.2 MMOL/L — SIGNIFICANT CHANGE UP (ref 3.5–5.3)
RBC # BLD: 4.48 M/UL — SIGNIFICANT CHANGE UP (ref 3.8–5.2)
RBC # FLD: 12.6 % — SIGNIFICANT CHANGE UP (ref 10.3–14.5)
SODIUM SERPL-SCNC: 140 MMOL/L — SIGNIFICANT CHANGE UP (ref 135–145)
WBC # BLD: 6.65 K/UL — SIGNIFICANT CHANGE UP (ref 3.8–10.5)
WBC # FLD AUTO: 6.65 K/UL — SIGNIFICANT CHANGE UP (ref 3.8–10.5)

## 2018-07-12 PROCEDURE — 93010 ELECTROCARDIOGRAM REPORT: CPT

## 2018-07-12 RX ORDER — DOCUSATE SODIUM 100 MG
2 CAPSULE ORAL
Qty: 60 | Refills: 0
Start: 2018-07-12 | End: 2018-08-10

## 2018-07-12 RX ORDER — RANITIDINE HYDROCHLORIDE 150 MG/1
1 TABLET, FILM COATED ORAL
Qty: 60 | Refills: 0
Start: 2018-07-12 | End: 2018-08-10

## 2018-07-12 RX ORDER — METOPROLOL TARTRATE 50 MG
50 TABLET ORAL
Qty: 0 | Refills: 0 | Status: DISCONTINUED | OUTPATIENT
Start: 2018-07-12 | End: 2018-07-12

## 2018-07-12 RX ADMIN — GABAPENTIN 300 MILLIGRAM(S): 400 CAPSULE ORAL at 05:30

## 2018-07-12 RX ADMIN — Medication 100 MILLIGRAM(S): at 14:18

## 2018-07-12 RX ADMIN — Medication 100 MILLIGRAM(S): at 05:30

## 2018-07-12 RX ADMIN — Medication 81 MILLIGRAM(S): at 11:30

## 2018-07-12 RX ADMIN — GABAPENTIN 300 MILLIGRAM(S): 400 CAPSULE ORAL at 14:19

## 2018-07-12 RX ADMIN — RALOXIFENE HYDROCHLORIDE 60 MILLIGRAM(S): 60 TABLET, COATED ORAL at 11:29

## 2018-07-12 RX ADMIN — Medication 50 MICROGRAM(S): at 05:30

## 2018-07-12 NOTE — DISCHARGE NOTE ADULT - HOSPITAL COURSE
83 year old Woman with pmhx of RA (followed by Dr Mccray), hypothyroidism, SVT s/p ablation w/ Dr Galo over a year ago; presented to ED with c/o dizziness and "almost passing out last night" with feeling of a flutter type sensation similar to the last time she needed ablation.  She was seen in the ED 6/30 for palpitations and sent home with rec for outpt f/u, holter and had followed up in our office and had just resumed normal activities when events occurred. She underwent EPS and AVNRT ablation with Dr Galo. she was optimized ans remained stable for discharge.  Pt educated on the post discharge meds, follow up and procedure care. She is to make an amber't to f/u with her  PCP within 1 week. Pt verbalized an understanding of and agreed with discharge plans. She is to follow up with her rheumatologist for mgmt of RA flare up.     HOSPITAL COURSE  7/8: feels well.  no events on tele  7/9:  c/o pain in her hand 2/2 arthritis but improved with tylenol this AM.  pt agreeable to EP procedure on wed with dr galo  7/10; c/o arthritis pain this AM.  no cp, sob.  no events on tele.   7/11: arthritic pain throughout body still severe without relief from tylenol.  Pt awaiting EP study today.  7/12: had EP ablation yesterday, ambulating well; arthritic pain is "much better"; had multiple question r/t event monitor she received; hand swelling from RA is much better, she does not want to take any more prednisone; had brief feeling of palpitations overnight, none since; feels anxious and wonders if she has anxiety.    All systems reviewed and found to be negative with the exception of what has been described above.    Vital Signs Last 24 Hrs  T(C): 36.6 (12 Jul 2018 11:29), Max: 36.6 (12 Jul 2018 11:29)  T(F): 97.9 (12 Jul 2018 11:29), Max: 97.9 (12 Jul 2018 11:29)  HR: 70 (12 Jul 2018 11:29) (61 - 91)  BP: 112/57 (12 Jul 2018 11:29) (112/57 - 161/79)  BP(mean): --  RR: 18 (12 Jul 2018 11:29) (17 - 18)  SpO2: 97% (12 Jul 2018 11:29) (94% - 99%)    PHYSICAL EXAMINATION:  GENERAL: In no apparent distress, well nourished, and hydrated.  HEAD:  Atraumatic, Normocephalic  EYES: EOMI, PERRLA, conjunctiva and sclera clear  ENMT: No tonsillar erythema, exudates, or enlargement; Moist mucous membranes, Good dentition, No lesions  NECK: Supple and normal thyroid.  No JVD or carotid bruit.  Carotid pulse is 2+ bilaterally.  HEART: Regular rate and rhythm; No murmurs, rubs, or gallops.  PULMONARY: Clear to auscultation and perfusion.  No rales, wheezing, or rhonchi bilaterally.  ABD: Soft, Nontender, Nondistended; Bowel sounds present  EXTREMITIES:  BLE groin procedure access site is soft, with mild tenderness (an expected finding) scattered superficial bruising, no hematoma, no active bleeding s/p suture removal by EP this am from b/l groin. b/l hand swelling improving, not tender as previous days  NEURO: A&Ox3, ambulating well around the unit, no focal deficits noted    ASSESSMENT / PLAN  82 yo female with PMH as per HPI here with near syncope now s/p ablation for AVNRT    Near syncope: Etiology unclear, possible cardiogenic vs dehydration and orthostasis  - trops negative   - chest xray no acute pathology  - orthostatics negative  - appreciate cardiology input (ok to dc home)  - continue aspirin.    - metoprolol stopped in anticipation of EP procedure. / resumed post ablation  - s/p IVFs, urged pt to stay well hydrated during summer    HTN   - BP improved (was elevated off BB)    RA: Appears to be in an acute flare  - pt no longer receiving treatment.   - started prednisone 20mg qd and taper on discharge with outpt f/u with dr macedo.  states she recieved last dose of arencia in january.  - PT eval  - f/u inflammatory markers for AM  - tylenol and oxycodone prn pain     DVT proph   - treated with lovenox     Dispo  - full code  - dc home CC: Dizziness/pre-syncope.  83 year old Woman with pmhx of RA (followed by Dr Mccray), hypothyroidism, SVT s/p ablation w/ Dr Booth over a year ago; presented to ED with c/o dizziness and "almost passing out last night" with feeling of a flutter type sensation similar to the last time she needed ablation.  She was seen in the ED 6/30 for palpitations and sent home with rec for outpt f/u, holter and had followed up in our office and had just resumed normal activities when events occurred. She underwent EPS and AVNRT ablation with Dr Booth. she was optimized ans remained stable for discharge.  Pt educated on the post discharge meds, follow up and procedure care. She is to make an amber't to f/u with her  PCP within 1 week. Pt verbalized an understanding of and agreed with discharge plans. She is to follow up with her rheumatologist for mgmt of RA flare up.     HOSPITAL COURSE  7/8: feels well.  no events on tele  7/9:  c/o pain in her hand 2/2 arthritis but improved with tylenol this AM.  pt agreeable to EP procedure on wed with dr booth  7/10; c/o arthritis pain this AM.  no cp, sob.  no events on tele.   7/11: arthritic pain throughout body still severe without relief from tylenol.  Pt awaiting EP study today.  7/12: had EP ablation yesterday, ambulating well; arthritic pain is "much better"; had multiple question r/t event monitor she received; hand swelling from RA is much better, she does not want to take any more prednisone; had brief feeling of palpitations overnight, none since; feels anxious and wonders if she has anxiety.    All systems reviewed and found to be negative with the exception of what has been described above.    Vital Signs Last 24 Hrs  T(C): 36.6 (12 Jul 2018 11:29), Max: 36.6 (12 Jul 2018 11:29)  T(F): 97.9 (12 Jul 2018 11:29), Max: 97.9 (12 Jul 2018 11:29)  HR: 70 (12 Jul 2018 11:29) (61 - 91)  BP: 112/57 (12 Jul 2018 11:29) (112/57 - 161/79)  BP(mean): --  RR: 18 (12 Jul 2018 11:29) (17 - 18)  SpO2: 97% (12 Jul 2018 11:29) (94% - 99%)    PHYSICAL EXAMINATION:  GENERAL: In no apparent distress, well nourished, and hydrated.  HEAD:  Atraumatic, Normocephalic  EYES: EOMI, PERRLA, conjunctiva and sclera clear  ENMT: No tonsillar erythema, exudates, or enlargement; Moist mucous membranes, Good dentition, No lesions  NECK: Supple and normal thyroid.  No JVD or carotid bruit.  Carotid pulse is 2+ bilaterally.  HEART: Regular rate and rhythm; No murmurs, rubs, or gallops.  PULMONARY: Clear to auscultation and perfusion.  No rales, wheezing, or rhonchi bilaterally.  ABD: Soft, Nontender, Nondistended; Bowel sounds present  EXTREMITIES:  BLE groin procedure access site is soft, with mild tenderness (an expected finding) scattered superficial bruising, no hematoma, no active bleeding s/p suture removal by EP this am from b/l groin. b/l hand swelling improving, not tender as previous days  NEURO: A&Ox3, ambulating well around the unit, no focal deficits noted    meds/labs: Reviewed    ASSESSMENT / PLAN  82 yo female with PMH as per HPI here with near syncope now s/p ablation for AVNRT    Near syncope: Etiology unclear, possible cardiogenic vs dehydration and orthostasis  - trops negative   - chest xray no acute pathology  - orthostatics negative  - appreciate cardiology input (ok to dc home)  - continue aspirin.    - metoprolol stopped in anticipation of EP procedure. / resumed post ablation  - s/p IVFs, urged pt to stay well hydrated during summer    HTN   - BP improved (was elevated off BB)    RA: Appears to be in an acute flare  - pt no longer receiving treatment.   - started prednisone 20mg qd and taper on discharge with outpt f/u with dr macedo.  states she recieved last dose of arencia in january.  - PT eval  - f/u inflammatory markers for AM  - tylenol and oxycodone prn pain     DVT proph   - treated with lovenox     Attending Statement:  40 minutes spent on total encounter; more than 50% of the visit was spent counseling and/or coordinating care by the attending physician.  Patient seen and examined with YENY Boyle.  Agree with physical exam and assessment and plan. Pt presented with pre-syncope/dizziness/questionable palpitations.  She is s/p EP study and ablation for AVNRT w/ loop recorder implanted.  She is to follow up outpatient with Dr. James and Leobardo.  She is HD stable, in sinus and currently asymptomatic.  She has severe arthritic pain from RA and discussed a short course of prednisone with close outpatient rheumatology follow up.

## 2018-07-12 NOTE — PROGRESS NOTE ADULT - PROBLEM SELECTOR PROBLEM 2
Rheumatoid arthritis involving both hands with positive rheumatoid factor

## 2018-07-12 NOTE — PROGRESS NOTE ADULT - SUBJECTIVE AND OBJECTIVE BOX
CHIEF COMPLAINT:  Patient is a 83y old  Female who presents with a chief complaint of I almost passed out (2018 10:13)      HPI:  18:  pt is 84 yo female with PMH of RA (followed by Dr Mccray), hypothyroidism and ablation for SVT arrhythmias with Dr Booth over a year ago and now presents with dizziness and almost passing out last night.   She states around 9 pm she felt the same flutter type of sensation she had prior to having her ablation last year with Dr Booth and this was associated with lightheadedness and chest pressure and some SOB.   She went and laid down immediately but did not loose consciousness.  She was seen in the ED  for palpitations and sent home with rec for outpt f/u, holter and had followed up in our office and had just resumed normal activities when events occurred.  Since arriving to the ED she has not  had any further episodes and has been sinus briseyda on tele monitor.   She has no anginal chest pains, SOB, diaphoresis, fever or chills.  She has been using CBT oils for her RA with significant improvement in her hand pains and stiffness.    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  20+ pack yrs hx,   occ ETOH use and denies drug use  lives alone at home        PMHx:  PAST MEDICAL & SURGICAL HISTORY:  Rheumatoid arthritis-Dr Mccray  SVT ablation-Dr Booth  Hypothyroidism      FAMILY HISTORY:   FAMILY HISTORY:  Mother - CAD  at age 93, father  at age 77 with hx of RA      ALLERGIES:  Allergies  contrast media (iodine-based) (Unknown)  penicillins (Unknown)  sulfa drugs (Unknown)      REVIEW OF SYSTEMS:    CONSTITUTIONAL: No weakness, fevers or chills  EYES/ENT: No visual changes;  No vertigo or throat pain   NECK: No pain or stiffness  RESPIRATORY: No cough, wheezing, hemoptysis; No shortness of breath  CARDIOVASCULAR: No chest pain or palpitations  GASTROINTESTINAL: No abdominal or epigastric pain. No nausea, vomiting, or hematemesis; No diarrhea or constipation. No melena or hematochezia.  GENITOURINARY: No dysuria, frequency or hematuria  NEUROLOGICAL: No numbness or weakness  SKIN: No itching, burning, rashes, or lesions   All other review of systems is negative unless indicated above      ICU Vital Signs Last 24 Hrs  T(C): 36.4 (2018 05:14), Max: 36.8 (2018 10:42)  T(F): 97.5 (2018 05:14), Max: 98.3 (2018 10:42)  HR: 67 (2018 05:14) (61 - 91)  BP: 151/69 (2018 05:14) (141/76 - 161/79)  BP(mean): --  ABP: --  ABP(mean): --  RR: 17 (2018 20:45) (17 - 18)  SpO2: 94% (2018 05:14) (94% - 99%)            PHYSICAL EXAM:   Constitutional: NAD, awake and alert, well-developed  HEENT: PERR, EOMI, Normal Hearing, MMM  Neck: Soft and supple, No LAD, No JVD  Respiratory: Breath sounds are clear bilaterally, No wheezing, rales or rhonchi  Cardiovascular: S1 and S2, regular rate and rhythm, soft GABRIEL at LLSB and base as before, no gallops or rubs  Gastrointestinal: Bowel Sounds present, soft, nontender, nondistended, no guarding, no rebound  Extremities: No peripheral edema, 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  Vascular: 2+ peripheral pulses  Neurological: A/O x 3, no focal deficits  Musculoskeletal: 5/5 strength b/l upper and lower extremities  Skin: No rashes    MEDICATIONS  (STANDING):  aspirin enteric coated 81 milliGRAM(s) Oral daily  atorvastatin 20 milliGRAM(s) Oral at bedtime  docusate sodium 100 milliGRAM(s) Oral three times a day  enoxaparin Injectable 40 milliGRAM(s) SubCutaneous every 24 hours  gabapentin 300 milliGRAM(s) Oral three times a day  isoproterenol Infusion 1 MICROgram(s)/Min (15 mL/Hr) IV Continuous <Continuous>  levothyroxine 50 MICROGram(s) Oral daily  predniSONE   Tablet 20 milliGRAM(s) Oral daily  raloxifene 60 milliGRAM(s) Oral daily  ranitidine  Oral Tab/Cap - Peds 150 milliGRAM(s) Oral three times a day            LABS: All Labs Reviewed:                        13.4   7.00  )-----------( 178      ( 2018 00:59 )             38.6         142  |  105  |  19  ----------------------------<  99  3.7   |  29  |  1.17    Ca    9.5      2018 00:59    TPro  7.1  /  Alb  2.8<L>  /  TBili  0.3  /  DBili  x   /  AST  22  /  ALT  16  /  AlkPhos  82      PT/INR - ( 2018 00:59 )   PT: 11.6 sec;   INR: 1.07 ratio         PTT - ( 2018 00:59 )  PTT:28.7 sec  CARDIAC MARKERS ( 2018 11:21 )  <0.015 ng/mL / x     / 42 U/L / x     / x      CARDIAC MARKERS ( 2018 04:50 )  <0.015 ng/mL / x     / x     / x     / x      CARDIAC MARKERS ( 2018 00:59 )  <0.015 ng/mL / x     / x     / x     / x            BLOOD CULTURES:   LIPID PROFILE     RADIOLOGY:  CXR: 18:  The lungs are clear. The heart size is normal.   [place] sutures noted along the left greater trochanter on the.   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  Clear lungs.       EK18:  Normal sinus rhythm  Possible Left atrial enlargement  When compared with ECG of 2017 21:23, Premature atrial complexes are no longer Present    TELEMETRY:  18:  NSR, Sinus bradycardia    ECHO:

## 2018-07-12 NOTE — PHYSICAL THERAPY INITIAL EVALUATION ADULT - CRITERIA FOR SKILLED THERAPEUTIC INTERVENTIONS
pt does not require further skilled PT intervention @ this time; recommend daily OOB / amb as rafiq on nursing floor care

## 2018-07-12 NOTE — DISCHARGE NOTE ADULT - CARE PLAN
Principal Discharge DX:	SVT (supraventricular tachycardia)  Goal:	Stable cardiac function with rate / Rhythm control  Assessment and plan of treatment:	Take your medications as prescribed  follow up with your cardiologist within 1 week and with the EP team for post ablation follow check  Secondary Diagnosis:	Rheumatoid arthritis flare  Goal:	resolution of RA flare  Assessment and plan of treatment:	take short course of prednisone as prescribed  follow up with your rheumatologist in 2-3 days

## 2018-07-12 NOTE — PROGRESS NOTE ADULT - ASSESSMENT
7/7/18:  Pt with above history and recurrent palpitations and near syncope.  Most likely having recurrent SVT but will continue to monitor on telemetry.  I doubt CBT oils are contributing to her symptoms but possible and will continue to monitor.  If no arrhythmias, will consider a LINQ insertion.  Dr Booth consulted and will await his opinion and further work-up and treatment.  Dr James will follow up on Monday  Continue her current meds and as outlined by medicine, etal.  Will follow.    7/9/18:  Pt stable.  For EP study
7/7/18:  Pt with above history and recurrent palpitations and near syncope.  Most likely having recurrent SVT but will continue to monitor on telemetry.  I doubt CBT oils are contributing to her symptoms but possible and will continue to monitor.  If no arrhythmias, will consider a LINQ insertion.  Dr Booth consulted and will await his opinion and further work-up and treatment.  Dr James will follow up on Monday  Continue her current meds and as outlined by medicine, etal.  Will follow.    7/9/18:  Pt stable.  For EP study    7/10/18:  Cardiac status remains stable.  For an EP study tomorrow
7/7/18:  Pt with above history and recurrent palpitations and near syncope.  Most likely having recurrent SVT but will continue to monitor on telemetry.  I doubt CBT oils are contributing to her symptoms but possible and will continue to monitor.  If no arrhythmias, will consider a LINQ insertion.  Dr Booth consulted and will await his opinion and further work-up and treatment.  Dr James will follow up on Monday  Continue her current meds and as outlined by medicine, etal.  Will follow.    7/9/18:  Pt stable.  For EP study    7/10/18:  Cardiac status remains stable.  For an EP study tomorrow    7/11/18:  For EP study today    7/12/18:  Patient is stable post AVNRT ablation.  Can discharge home and follow up with myself and Dr Booth as an outpatient.
7/7/18:  Pt with above history and recurrent palpitations and near syncope.  Most likely having recurrent SVT but will continue to monitor on telemetry.  I doubt CBT oils are contributing to her symptoms but possible and will continue to monitor.  If no arrhythmias, will consider a LINQ insertion.  Dr Booth consulted and will await his opinion and further work-up and treatment.  Dr James will follow up on Monday.  Continue her current meds and as outlined by medicine, etal.  Will follow.    7/8/18:  No arrhythmias seen on the monitor.  All labs unremarkable so far.  Awaiting Dr Booth's input.  Will likely need a LINQ placement if nothing shows on the monitor overnight.  Whether the CBT oil is contributing to her symptoms???  Dr James will follow up tomorrow.  Continue her current meds and as outlined by medicine, etal.  Will follow.
ASSESSMENT AND PLAN  83yFemale POD#1, Status Post ablation. Patient denies any complaints of chest pain, SOB, dizziness, palpitations or any significant discomfort.  Stable for Discharge from EP standpoint.   Postop instructions provided and patient understood.  Patient to follow up in EP clinic in 2 weeks, or sooner with questions and concerns.
7/7/18:  Pt with above history and recurrent palpitations and near syncope.  Most likely having recurrent SVT but will continue to monitor on telemetry.  I doubt CBT oils are contributing to her symptoms but possible and will continue to monitor.  If no arrhythmias, will consider a LINQ insertion.  Dr Booth consulted and will await his opinion and further work-up and treatment.  Dr James will follow up on Monday  Continue her current meds and as outlined by medicine, etal.  Will follow.    7/9/18:  Pt stable.  For EP study    7/10/18:  Cardiac status remains stable.  For an EP study tomorrow    7/11/18:  For EP study today

## 2018-07-12 NOTE — PHYSICAL THERAPY INITIAL EVALUATION ADULT - MODALITIES TREATMENT COMMENTS
pt left seated in chair post Eval; chair alarm donned; HM in place; callbell in reach; pt instructed not to get up alone; call nursing for assist; rafiq well; denied pain; RN Josie made aware pt OOB

## 2018-07-12 NOTE — PROGRESS NOTE ADULT - PROBLEM SELECTOR PROBLEM 5
Bradycardia with 51-60 beats per minute

## 2018-07-12 NOTE — DISCHARGE NOTE ADULT - MEDICATION SUMMARY - MEDICATIONS TO TAKE
I will START or STAY ON the medications listed below when I get home from the hospital:    predniSONE 20 mg oral tablet  -- 1 tab(s) by mouth once a day  -- Indication: For Rheumatoid arthritis involving both hands with positive rheumatoid factor    Aspir 81 oral delayed release tablet  -- 1 tab(s) by mouth once a day  -- Indication: For preventative    gabapentin 300 mg oral capsule  -- 1 cap(s) by mouth 3 times a day  -- Indication: For Rheumatoid arthritis involving both hands with positive rheumatoid factor    pravastatin 80 mg oral tablet  -- 1 tab(s) by mouth once a day  -- Indication: For cholesterol    raloxifene 60 mg oral tablet  -- 1 tab(s) by mouth once a day  -- Indication: For Rheumatoid arthritis involving both hands with positive rheumatoid factor    Metoprolol Tartrate 50 mg oral tablet  -- 1 tab(s) by mouth 2 times a day  -- Indication: For SVT (supraventricular tachycardia)    raNITIdine 150 mg oral tablet  -- 1 tab(s) by mouth 2 times a day   -- Indication: For preventative    docusate sodium 100 mg oral capsule  -- 2 cap(s) by mouth once a day x 30 days   -- Indication: For constipation prevention while on metoprolol    CoQ10 300 mg oral capsule  -- 1 cap(s) by mouth once a day  -- Indication: For Supplement    Synthroid 50 mcg (0.05 mg) oral tablet  -- 1 tab(s) by mouth once a day  -- Indication: For Hypothyroidism, unspecified type    Vitamin D3  -- 09484 unit(s) by mouth once a week (friday)  -- Indication: For Supplement    folic acid 1 mg oral tablet  -- 1 tab(s) by mouth once a day  -- Indication: For Supplement

## 2018-07-12 NOTE — PROGRESS NOTE ADULT - SUBJECTIVE AND OBJECTIVE BOX
HPI: 82 y/o male who is s/p Ablation performed by Dr. Booth and is Post-OP Day  #1      PSH - as per HPI   FH - mother - CAD  at age 93, father  at age 77 with hx of RA  SH - hx of tob use and smoked til 8 years ago.  20+ PY hx, occ ETOH use and denies drug use  lives alone at home (2018 10:13)    Vital Signs Last 24 Hrs  T(C): 36.4 (2018 05:14), Max: 36.8 (2018 10:42)  T(F): 97.5 (2018 05:14), Max: 98.3 (2018 10:42)  HR: 67 (2018 05:14) (61 - 91)  BP: 151/69 (2018 05:14) (141/76 - 161/79)  BP(mean): --  RR: 17 (2018 20:45) (17 - 18)  SpO2: 94% (2018 05:14) (94% - 99%)    PHYSICAL EXAMINATION:  GENERAL: In no apparent distress, well nourished, and hydrated.  HEAD:  Atraumatic, Normocephalic  EYES: EOMI, PERRLA, conjunctiva and sclera clear  ENMT: No tonsillar erythema, exudates, or enlargement; Moist mucous membranes, Good dentition, No lesions  NECK: Supple and normal thyroid.  No JVD or carotid bruit.  Carotid pulse is 2+ bilaterally.  HEART: Regular rate and rhythm; No murmurs, rubs, or gallops.  PULMONARY: Clear to auscultation and perfusion.  No rales, wheezing, or rhonchi bilaterally.  ABDOMEN: Soft, Nontender, Nondistended; Bowel sounds present  EXTREMITIES:  2+ Peripheral Pulses, No clubbing, cyanosis, or edema, both sutures removed from bliateral groin, no hematoma  LYMPH: No lymphadenopathy noted  NEUROLOGICAL: Grossly nonfocal               Tele:  SR with two brief episodes of ST at 120 BPM otherwise her Heart rate has been SR at 70 BPMs fro the last 18 hours  EKG :  SB at 63 BPM

## 2018-07-12 NOTE — DISCHARGE NOTE ADULT - INSTRUCTIONS
no added salt, low fat diet  no soaking in tub or swimming pool, no heaving lifting of more than 10 pounds for 1 week and no vigorous stair climbing. This is prevent hematoma or other complications from the procedure site which can occur even after discharge.

## 2018-07-12 NOTE — DISCHARGE NOTE ADULT - ADDITIONAL INSTRUCTIONS
Follow up with Dr James early next week  follow up with Dr Booth as per your discussion with the team

## 2018-07-12 NOTE — PROGRESS NOTE ADULT - PROVIDER SPECIALTY LIST ADULT
Cardiology
Electrophysiology
Hospitalist
Cardiology
Cardiology

## 2018-07-12 NOTE — DISCHARGE NOTE ADULT - PLAN OF CARE
Stable cardiac function with rate / Rhythm control Take your medications as prescribed  follow up with your cardiologist within 1 week and with the EP team for post ablation follow check resolution of RA flare take short course of prednisone as prescribed  follow up with your rheumatologist in 2-3 days

## 2018-09-26 ENCOUNTER — APPOINTMENT (OUTPATIENT)
Dept: DERMATOLOGY | Facility: CLINIC | Age: 83
End: 2018-09-26

## 2018-10-26 PROBLEM — I01.1 ACUTE RHEUMATIC ENDOCARDITIS: Chronic | Status: ACTIVE | Noted: 2018-07-06

## 2018-11-05 ENCOUNTER — APPOINTMENT (OUTPATIENT)
Dept: OPHTHALMOLOGY | Facility: CLINIC | Age: 83
End: 2018-11-05
Payer: MEDICARE

## 2018-11-05 DIAGNOSIS — Z96.1 PRESENCE OF INTRAOCULAR LENS: ICD-10-CM

## 2018-11-05 DIAGNOSIS — H04.123 DRY EYE SYNDROME OF BILATERAL LACRIMAL GLANDS: ICD-10-CM

## 2018-11-05 PROCEDURE — 92002 INTRM OPH EXAM NEW PATIENT: CPT

## 2018-12-10 ENCOUNTER — APPOINTMENT (OUTPATIENT)
Dept: OPHTHALMOLOGY | Facility: CLINIC | Age: 83
End: 2018-12-10

## 2019-03-28 ENCOUNTER — APPOINTMENT (OUTPATIENT)
Dept: OBGYN | Facility: CLINIC | Age: 84
End: 2019-03-28
Payer: MEDICARE

## 2019-03-28 VITALS
BODY MASS INDEX: 25.83 KG/M2 | HEIGHT: 65 IN | DIASTOLIC BLOOD PRESSURE: 75 MMHG | WEIGHT: 155 LBS | SYSTOLIC BLOOD PRESSURE: 125 MMHG

## 2019-03-28 LAB
BILIRUB UR QL STRIP: ABNORMAL
COLLECTION METHOD: NORMAL
GLUCOSE UR-MCNC: NORMAL
HCG UR QL: 0.2 EU/DL
HEMOCCULT SP1 STL QL: NEGATIVE
HGB UR QL STRIP.AUTO: NORMAL
KETONES UR-MCNC: ABNORMAL
LEUKOCYTE ESTERASE UR QL STRIP: NORMAL
NITRITE UR QL STRIP: NORMAL
PH UR STRIP: 5
PROT UR STRIP-MCNC: NORMAL
SP GR UR STRIP: >=1.03

## 2019-03-28 PROCEDURE — 82270 OCCULT BLOOD FECES: CPT

## 2019-03-28 PROCEDURE — 81003 URINALYSIS AUTO W/O SCOPE: CPT | Mod: QW

## 2019-03-28 PROCEDURE — G0101: CPT

## 2019-03-28 RX ORDER — CHOLECALCIFEROL (VITAMIN D3) 125 MCG
TABLET ORAL
Refills: 0 | Status: DISCONTINUED | COMMUNITY

## 2019-03-28 NOTE — HISTORY OF PRESENT ILLNESS
[1 Year Ago] : 1 year ago [Fair] : being in fair health [Healthy Diet] : a healthy diet [Last Mammogram ___] : Last Mammogram was [unfilled] [Last Bone Density ___] : Last bone density studies [unfilled] [Last Colonoscopy ___] : Last colonoscopy [unfilled] [Last Pap ___] : Last cervical pap smear was [unfilled] [Postmenopausal] : is postmenopausal [Definite:  ___ (Date)] : the last menstrual period was [unfilled] [Currently In Menopause] : currently in menopause [Regular Exercise] : not exercising regularly [Weight Concerns] : no concerns with her weight [Sexually Active] : is not sexually active [de-identified] : ACTIVE, BUT NO FORMAL EXERCISE [Hot Flashes] : no hot flashes [Night Sweats] : no night sweats [Dyspareunia] : no dyspareunia [Experiencing Menopausal Sxs] : not experiencing menopausal symptoms

## 2019-03-28 NOTE — REVIEW OF SYSTEMS
[Feeling Tired] : feeling tired [Nl] : Integumentary [Sleep Disturbances] : no sleep disturbances [Anxiety] : no anxiety [Depression] : no depression

## 2019-03-28 NOTE — PHYSICAL EXAM
[Awake] : awake [Alert] : alert [Soft] : soft [Oriented x3] : oriented to person, place, and time [Vulvar Atrophy] : vulvar atrophy [Normal] : uterus [No Bleeding] : there was no active vaginal bleeding [Uterine Adnexae] : were not tender and not enlarged [Nl Sphincter Tone] : normal sphincter tone [External Hemorrhoid] : an external hemorrhoid [Acute Distress] : no acute distress [Mass] : no breast mass [Nipple Discharge] : no nipple discharge [Axillary LAD] : no axillary lymphadenopathy [Tender] : non tender [Occult Blood] : occult blood test from digital rectal exam was negative

## 2019-04-02 LAB — CYTOLOGY CVX/VAG DOC THIN PREP: NORMAL

## 2020-03-04 ENCOUNTER — APPOINTMENT (OUTPATIENT)
Dept: INTERNAL MEDICINE | Facility: CLINIC | Age: 85
End: 2020-03-04
Payer: MEDICARE

## 2020-03-04 VITALS
TEMPERATURE: 98 F | WEIGHT: 155 LBS | RESPIRATION RATE: 18 BRPM | SYSTOLIC BLOOD PRESSURE: 108 MMHG | DIASTOLIC BLOOD PRESSURE: 76 MMHG | BODY MASS INDEX: 26.46 KG/M2 | HEART RATE: 54 BPM | HEIGHT: 64 IN | OXYGEN SATURATION: 99 %

## 2020-03-04 DIAGNOSIS — Z87.891 PERSONAL HISTORY OF NICOTINE DEPENDENCE: ICD-10-CM

## 2020-03-04 DIAGNOSIS — R06.09 OTHER FORMS OF DYSPNEA: ICD-10-CM

## 2020-03-04 PROCEDURE — 94729 DIFFUSING CAPACITY: CPT

## 2020-03-04 PROCEDURE — 94060 EVALUATION OF WHEEZING: CPT

## 2020-03-04 PROCEDURE — ZZZZZ: CPT

## 2020-03-04 PROCEDURE — 99205 OFFICE O/P NEW HI 60 MIN: CPT | Mod: 25

## 2020-03-04 PROCEDURE — 94727 GAS DIL/WSHOT DETER LNG VOL: CPT

## 2020-03-04 RX ORDER — HYDROCORTISONE 25 MG/G
2.5 CREAM TOPICAL
Qty: 1 | Refills: 3 | Status: DISCONTINUED | COMMUNITY
Start: 2017-03-22 | End: 2020-03-04

## 2020-03-04 NOTE — HISTORY OF PRESENT ILLNESS
[Never] : never [TextBox_4] : Ms. Buck presents for initial pulmonary evaluation. She has a history of rheumatoid arthritis. The patient is complaining of mid thoracic back pain which is more prominent on the right side. She does have some discomfort with deep inspiration. Patient states she has shortness of breath with exertion. Ms. Buck is not on methotrexate therapy. She denies any symptoms of daytime tiredness. Patient has no nocturnal symptoms of cough or dyspnea. There is no previous history of asthma seasonal allergic rhinitis. She is a nonsmoker.

## 2020-03-04 NOTE — PROCEDURE
[FreeTextEntry1] : Complete pulmonary function tests show normal spirometry, lung lines and flow-volume loop. Diffusing capacity is normal.

## 2020-03-04 NOTE — PHYSICAL EXAM
[No Acute Distress] : no acute distress [Normal Oropharynx] : normal oropharynx [Normal Appearance] : normal appearance [No Neck Mass] : no neck mass [Normal S1, S2] : normal s1, s2 [No Murmurs] : no murmurs [Normal Rate/Rhythm] : normal rate/rhythm [No Resp Distress] : no resp distress [Clear to Auscultation Bilaterally] : clear to auscultation bilaterally [No Abnormalities] : no abnormalities [Benign] : benign [Normal Gait] : normal gait [No Clubbing] : no clubbing [No Cyanosis] : no cyanosis [FROM] : FROM [Normal Color/ Pigmentation] : normal color/ pigmentation [No Edema] : no edema [Oriented x3] : oriented x3 [No Focal Deficits] : no focal deficits [Normal Affect] : normal affect

## 2020-03-04 NOTE — ASSESSMENT
[FreeTextEntry1] : Ms. Buck presents for follow up evaluation. She is experiencing some mid thoracic back pain which is musculoskeletal in nature. It is reproducible by palpation. Complete point function test show no evidence of significant restrictive or obstructive lung disease. The patient does have a history of rheumatoid arthritis. She'll be sent for baseline chest x-ray. Followup in this office in one year with repeat complete pulmonary function test.

## 2020-04-16 NOTE — DISCHARGE NOTE ADULT - CARE PROVIDER_API CALL
Detail Level: Zone Rene Booth), Cardiac Electrophysiology; Cardiovascular Disease; Internal Medicine  [address] [place] [address]  Phone: [phone]  Fax: [phone]    Rosendo James (MD), Cardiovascular Disease; Internal Medicine  [address] [facility]  [address]  [place] [address]  Phone: [phone]  Fax: [phone]    Rebeca Mccray), Rheumatology  [address] [place] [address]  Phone: [phone]  Fax: [phone]

## 2020-11-03 ENCOUNTER — LABORATORY RESULT (OUTPATIENT)
Age: 85
End: 2020-11-03

## 2020-11-04 ENCOUNTER — APPOINTMENT (OUTPATIENT)
Dept: OBGYN | Facility: CLINIC | Age: 85
End: 2020-11-04
Payer: MEDICARE

## 2020-11-04 VITALS
DIASTOLIC BLOOD PRESSURE: 70 MMHG | WEIGHT: 158 LBS | HEIGHT: 64 IN | BODY MASS INDEX: 26.98 KG/M2 | SYSTOLIC BLOOD PRESSURE: 120 MMHG

## 2020-11-04 DIAGNOSIS — C43.9 MALIGNANT MELANOMA OF SKIN, UNSPECIFIED: ICD-10-CM

## 2020-11-04 DIAGNOSIS — Z00.00 ENCOUNTER FOR GENERAL ADULT MEDICAL EXAMINATION W/OUT ABNORMAL FINDINGS: ICD-10-CM

## 2020-11-04 DIAGNOSIS — Z23 ENCOUNTER FOR IMMUNIZATION: ICD-10-CM

## 2020-11-04 PROCEDURE — 90662 IIV NO PRSV INCREASED AG IM: CPT

## 2020-11-04 PROCEDURE — G0008: CPT

## 2020-11-04 PROCEDURE — 99214 OFFICE O/P EST MOD 30 MIN: CPT

## 2020-11-04 NOTE — PLAN
[FreeTextEntry1] : FLU VACCINE NEED.\par \par RECENT DX MELANOMA, FH BREAST CANCER, DAUGHTER, FOR BREAST CANCER SCREENING.  F/U MAMMOGRAM RESULTS OF LAST WEEK.\par \par OSTEOPENIA, DEXA DONE 2019.  REPEAT DEXA [id number].

## 2020-11-04 NOTE — PHYSICAL EXAM
[Appropriately responsive] : appropriately responsive [Alert] : alert [No Acute Distress] : no acute distress [No Lymphadenopathy] : no lymphadenopathy [Regular Rate Rhythm] : regular rate rhythm [No Murmurs] : no murmurs [Clear to Auscultation B/L] : clear to auscultation bilaterally [Soft] : soft [Non-tender] : non-tender [Non-distended] : non-distended [No HSM] : No HSM [No Lesions] : no lesions [No Mass] : no mass [Oriented x3] : oriented x3 [Examination Of The Breasts] : a normal appearance [Breast Nipple Inversion] : inverted [No Discharge] : no discharge [___] : a [unfilled] ~Ucm area of erythema [No Masses] : no breast masses were palpable [Labia Majora] : normal [Labia Minora] : normal [Normal] : normal [Uterine Adnexae] : normal [No Tenderness] : no tenderness [Nl Sphincter Tone] : normal sphincter tone

## 2020-11-04 NOTE — HISTORY OF PRESENT ILLNESS
[Patient reported PAP Smear was normal] : Patient reported PAP Smear was normal [Patient reported bone density results were abnormal] : Patient reported bone density results were abnormal [Patient reported colonoscopy was normal] : Patient reported colonoscopy was normal [FreeTextEntry1] : Melanoma on left cheek had Mohs surgery at [facility] 10/26/2020, steri strips in place, appears to be healing well. Had mammo yesterday. No breast complaints, no urinary complaints. \par \par DAUGHTER HX BREAST CANCER.  MGF POSSIBLY SKIN CANCER IN [place].\par \par FLU VACCINE NEEDED THIS YEAR.\par \par HX OF HR HPV, FOR PAP THIS YEAR. [Mammogramdate] : 11/2020 [TextBox_19] : AT [facility], RESULTS PENDING [PapSmeardate] : 3/2019 [BoneDensityDate] : 1/2019 [TextBox_37] : osteopenia but stable [ColonoscopyDate] : 2016

## 2020-11-04 NOTE — PROCEDURE
[Cervical Pap Smear] : cervical Pap smear [Tolerated Well] : the patient tolerated the procedure well

## 2021-01-26 ENCOUNTER — OUTPATIENT (OUTPATIENT)
Dept: OUTPATIENT SERVICES | Facility: HOSPITAL | Age: 86
LOS: 1 days | End: 2021-01-26
Payer: MEDICARE

## 2021-01-26 DIAGNOSIS — Z20.828 CONTACT WITH AND (SUSPECTED) EXPOSURE TO OTHER VIRAL COMMUNICABLE DISEASES: ICD-10-CM

## 2021-01-26 DIAGNOSIS — Z90.49 ACQUIRED ABSENCE OF OTHER SPECIFIED PARTS OF DIGESTIVE TRACT: Chronic | ICD-10-CM

## 2021-01-26 DIAGNOSIS — H25.093 OTHER AGE-RELATED INCIPIENT CATARACT, BILATERAL: Chronic | ICD-10-CM

## 2021-01-26 LAB — SARS-COV-2 RNA SPEC QL NAA+PROBE: SIGNIFICANT CHANGE UP

## 2021-01-26 PROCEDURE — U0005: CPT

## 2021-01-26 PROCEDURE — C9803: CPT

## 2021-01-26 PROCEDURE — U0003: CPT

## 2021-01-27 DIAGNOSIS — Z20.828 CONTACT WITH AND (SUSPECTED) EXPOSURE TO OTHER VIRAL COMMUNICABLE DISEASES: ICD-10-CM

## 2021-03-04 ENCOUNTER — NON-APPOINTMENT (OUTPATIENT)
Age: 86
End: 2021-03-04

## 2021-03-23 RX ORDER — ABATACEPT 125 MG/ML
INJECTION, SOLUTION SUBCUTANEOUS
Refills: 0 | Status: DISCONTINUED | COMMUNITY
End: 2021-03-23

## 2021-03-23 RX ORDER — GABAPENTIN 300 MG/1
300 CAPSULE ORAL TWICE DAILY
Refills: 0 | Status: ACTIVE | COMMUNITY

## 2021-03-23 RX ORDER — CHOLECALCIFEROL (VITAMIN D3) 1250 MCG
1.25 MG CAPSULE ORAL
Qty: 12 | Refills: 3 | Status: DISCONTINUED | COMMUNITY
Start: 2017-06-29 | End: 2021-03-23

## 2021-03-25 ENCOUNTER — NON-APPOINTMENT (OUTPATIENT)
Age: 86
End: 2021-03-25

## 2021-03-25 ENCOUNTER — APPOINTMENT (OUTPATIENT)
Dept: ELECTROPHYSIOLOGY | Facility: CLINIC | Age: 86
End: 2021-03-25
Payer: MEDICARE

## 2021-03-25 VITALS
RESPIRATION RATE: 16 BRPM | HEART RATE: 57 BPM | WEIGHT: 163 LBS | BODY MASS INDEX: 27.83 KG/M2 | OXYGEN SATURATION: 99 % | HEIGHT: 64 IN | SYSTOLIC BLOOD PRESSURE: 139 MMHG | DIASTOLIC BLOOD PRESSURE: 71 MMHG

## 2021-03-25 DIAGNOSIS — Z87.898 PERSONAL HISTORY OF OTHER SPECIFIED CONDITIONS: ICD-10-CM

## 2021-03-25 PROCEDURE — 93000 ELECTROCARDIOGRAM COMPLETE: CPT

## 2021-03-25 PROCEDURE — 99204 OFFICE O/P NEW MOD 45 MIN: CPT

## 2021-03-25 RX ORDER — VITAMIN E 268 MG
CAPSULE ORAL
Refills: 0 | Status: DISCONTINUED | COMMUNITY
End: 2021-03-25

## 2021-03-25 RX ORDER — RANITIDINE HYDROCHLORIDE 150 MG/1
150 CAPSULE ORAL
Qty: 60 | Refills: 0 | Status: DISCONTINUED | COMMUNITY
Start: 2017-01-31 | End: 2021-03-25

## 2021-03-25 NOTE — PHYSICAL EXAM
[Normal Appearance] : normal appearance [Normal Oral Mucosa] : normal oral mucosa [Heart Rate And Rhythm] : heart rate and rhythm were normal [Heart Sounds] : normal S1 and S2 [] : no respiratory distress [Respiration, Rhythm And Depth] : normal respiratory rhythm and effort [Bowel Sounds] : normal bowel sounds [Skin Color & Pigmentation] : normal skin color and pigmentation [Skin Turgor] : normal skin turgor [Oriented To Time, Place, And Person] : oriented to person, place, and time [Impaired Insight] : insight and judgment were intact

## 2021-03-25 NOTE — DISCUSSION/SUMMARY
[FreeTextEntry1] : 86-year-old female with history of rheumatoid arthritis former smoker hypothyroidism who has ILR [facility] [place] since 2018 she has multiple episodes of palpitations and dizziness/presyncope, unclear if symptoms related to anxiety or arrhythmia. \par -ILR at EOS she would benefit from explant/reimplant to monitor for symptom arrhythmia correlation in setting of presyncope. To be done in EP lab need to use fluoro to identify the device. Discussed i/r/b/a of ILR explant/implant and all questions were answered.\par continue current medications\par Total length of time spent with this patient was 45 minutes and more then half of the time was spent face to face with the patient as well as counseling and coordination of care.\par

## 2021-03-25 NOTE — HISTORY OF PRESENT ILLNESS
[FreeTextEntry1] : 86-year-old female with history of rheumatoid arthritis former smoker hypothyroidism who has ILR [facility] since 2018 she has atypical chest discomfort, rush of heat and fluttering and dizziness, irregular heart beats, no syncope or chest pain. \par ILR is at EOS, cannot interrogate\par ECG shows SR 55 bpm

## 2021-03-31 DIAGNOSIS — Z01.818 ENCOUNTER FOR OTHER PREPROCEDURAL EXAMINATION: ICD-10-CM

## 2021-04-02 ENCOUNTER — APPOINTMENT (OUTPATIENT)
Dept: DISASTER EMERGENCY | Facility: CLINIC | Age: 86
End: 2021-04-02

## 2021-04-03 LAB — SARS-COV-2 N GENE NPH QL NAA+PROBE: NOT DETECTED

## 2021-04-05 ENCOUNTER — OUTPATIENT (OUTPATIENT)
Dept: OUTPATIENT SERVICES | Facility: HOSPITAL | Age: 86
LOS: 1 days | Discharge: HOME | End: 2021-04-05
Payer: MEDICARE

## 2021-04-05 VITALS
WEIGHT: 160.06 LBS | TEMPERATURE: 97 F | DIASTOLIC BLOOD PRESSURE: 58 MMHG | HEIGHT: 65 IN | OXYGEN SATURATION: 96 % | RESPIRATION RATE: 16 BRPM | HEART RATE: 55 BPM | SYSTOLIC BLOOD PRESSURE: 149 MMHG

## 2021-04-05 VITALS
RESPIRATION RATE: 14 BRPM | DIASTOLIC BLOOD PRESSURE: 62 MMHG | OXYGEN SATURATION: 98 % | SYSTOLIC BLOOD PRESSURE: 128 MMHG | HEART RATE: 60 BPM

## 2021-04-05 DIAGNOSIS — M06.9 RHEUMATOID ARTHRITIS, UNSPECIFIED: ICD-10-CM

## 2021-04-05 DIAGNOSIS — Z90.49 ACQUIRED ABSENCE OF OTHER SPECIFIED PARTS OF DIGESTIVE TRACT: Chronic | ICD-10-CM

## 2021-04-05 DIAGNOSIS — R42 DIZZINESS AND GIDDINESS: ICD-10-CM

## 2021-04-05 DIAGNOSIS — Z45.09 ENCOUNTER FOR ADJUSTMENT AND MANAGEMENT OF OTHER CARDIAC DEVICE: ICD-10-CM

## 2021-04-05 DIAGNOSIS — H25.093 OTHER AGE-RELATED INCIPIENT CATARACT, BILATERAL: Chronic | ICD-10-CM

## 2021-04-05 PROCEDURE — C1764: CPT

## 2021-04-05 PROCEDURE — 33285 INSJ SUBQ CAR RHYTHM MNTR: CPT

## 2021-04-05 PROCEDURE — 33286 RMVL SUBQ CAR RHYTHM MNTR: CPT | Mod: XS

## 2021-04-05 RX ORDER — MIDAZOLAM HYDROCHLORIDE 1 MG/ML
1 INJECTION, SOLUTION INTRAMUSCULAR; INTRAVENOUS ONCE
Refills: 0 | Status: DISCONTINUED | OUTPATIENT
Start: 2021-04-05 | End: 2021-04-05

## 2021-04-05 RX ORDER — FENTANYL CITRATE 50 UG/ML
25 INJECTION INTRAVENOUS ONCE
Refills: 0 | Status: DISCONTINUED | OUTPATIENT
Start: 2021-04-05 | End: 2021-04-05

## 2021-04-05 NOTE — PACU DISCHARGE NOTE - COMMENTS
Status post loop recorder explant/loop recorder implant.  Tolerated procedure well.  Recovery uneventful.  Discharge instructions given.  Transported to [facility] via wheelchair.

## 2021-04-05 NOTE — PROCEDURE NOTE - NSICDXPROCEDURE_GEN_ALL_CORE_FT
PROCEDURES:  Insertion, implantable loop recorder 05-Apr-2021 09:02:39  Vesirma, Granit  Removal, implantable loop recorder 05-Apr-2021 09:02:50  Karan Young

## 2021-04-05 NOTE — PROCEDURE NOTE - GENERAL PROCEDURE DETAILS
succesful explant of ILR and reimplant of ILR [place] slighly more cranial position by 2nd-3rd IC space parasternal region. Normal P/R sensing. hemostasis achieved with 4.0 suture

## 2021-04-09 ENCOUNTER — APPOINTMENT (OUTPATIENT)
Dept: ELECTROPHYSIOLOGY | Facility: CLINIC | Age: 86
End: 2021-04-09
Payer: MEDICARE

## 2021-04-09 VITALS
HEIGHT: 64 IN | DIASTOLIC BLOOD PRESSURE: 79 MMHG | HEART RATE: 55 BPM | BODY MASS INDEX: 26.46 KG/M2 | SYSTOLIC BLOOD PRESSURE: 130 MMHG | OXYGEN SATURATION: 97 % | WEIGHT: 155 LBS

## 2021-04-09 DIAGNOSIS — Z98.890 OTHER SPECIFIED POSTPROCEDURAL STATES: ICD-10-CM

## 2021-04-09 PROCEDURE — 93285 PRGRMG DEV EVAL SCRMS IP: CPT

## 2021-04-09 PROCEDURE — 99211 OFF/OP EST MAY X REQ PHY/QHP: CPT

## 2021-04-09 RX ORDER — PREDNISONE 50 MG/1
TABLET ORAL
Refills: 0 | Status: DISCONTINUED | COMMUNITY
End: 2021-04-09

## 2021-04-09 RX ORDER — RITUXIMAB 10 MG/ML
INJECTION, SOLUTION INTRAVENOUS
Refills: 0 | Status: DISCONTINUED | COMMUNITY
End: 2021-04-09

## 2021-04-09 NOTE — HISTORY OF PRESENT ILLNESS
[FreeTextEntry1] : 86 year old with history of palpitations, AVNRT s/p ablation who is s/p ILR placement on 4/5/2021 and explant of previous loop recorder.  Patient returns for follow up prior to leaving for vacation.  Denies any CP, palpitations, SOB, dizziness, lightheadedness, drainage or pain at incision sites.

## 2021-04-09 NOTE — PHYSICAL EXAM
[General Appearance - Well Developed] : well developed [General Appearance - Well Nourished] : well nourished [Auscultation Breath Sounds / Voice Sounds] : lungs were clear to auscultation bilaterally [Heart Rate And Rhythm] : heart rate and rhythm were normal [Heart Sounds] : normal S1 and S2 [Edema] : no peripheral edema present [Abnormal Walk] : normal gait [] : no rash [FreeTextEntry1] :  both mid sternal incision C/D/I, dressings removed.  Bacitracin applied with band aid [Oriented To Time, Place, And Person] : oriented to person, place, and time

## 2021-04-09 NOTE — DISCUSSION/SUMMARY
[FreeTextEntry1] : Patient will apply Bacitracin to ILR implant site in the AM PRN\par Monitor site for any s/s of infection, redness, drainage or bruising\par Continue current meds\par Continue remote monitoring with Dr. Monterroso office\par Confirmed with Dr. James's office patient is enrolled and they are receiving transmissions\par Follow up with Dr. James as scheduled

## 2021-04-09 NOTE — ASSESSMENT
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 ILR interrogation reveals\par Normal function\par Remotely monitored by Dr. James's office

## 2021-04-12 ENCOUNTER — NON-APPOINTMENT (OUTPATIENT)
Age: 86
End: 2021-04-12

## 2021-04-19 ENCOUNTER — APPOINTMENT (OUTPATIENT)
Dept: ELECTROPHYSIOLOGY | Facility: CLINIC | Age: 86
End: 2021-04-19

## 2021-04-20 ENCOUNTER — APPOINTMENT (OUTPATIENT)
Dept: ELECTROPHYSIOLOGY | Facility: CLINIC | Age: 86
End: 2021-04-20
Payer: MEDICARE

## 2021-04-20 VITALS
WEIGHT: 155 LBS | BODY MASS INDEX: 26.46 KG/M2 | HEIGHT: 64 IN | HEART RATE: 59 BPM | SYSTOLIC BLOOD PRESSURE: 131 MMHG | OXYGEN SATURATION: 98 % | DIASTOLIC BLOOD PRESSURE: 62 MMHG

## 2021-04-20 DIAGNOSIS — R42 DIZZINESS AND GIDDINESS: ICD-10-CM

## 2021-04-20 DIAGNOSIS — R00.2 PALPITATIONS: ICD-10-CM

## 2021-04-20 PROCEDURE — 99215 OFFICE O/P EST HI 40 MIN: CPT

## 2021-04-20 PROCEDURE — 93000 ELECTROCARDIOGRAM COMPLETE: CPT

## 2021-04-20 NOTE — DISCUSSION/SUMMARY
[FreeTextEntry1] : 86-year-old female with history of rheumatoid arthritis former smoker hypothyroidism who has ILR [facility] since 2018 she has multiple episodes of palpitations and dizziness/presyncope, symptoms may be neurocardiogenic or anxiety related. \par -ILR shows no arrhythmia good p/r sensing\par -encouraged to wear compression stocking \par -SELENE\par Total length of time spent with this patient was 45 minutes and more then half of the time was spent face to face with the patient as well as counseling and coordination of care.\par

## 2021-04-20 NOTE — HISTORY OF PRESENT ILLNESS
[FreeTextEntry1] : 86-year-old female with history of rheumatoid arthritis former smoker hypothyroidism who has ILR [facility] since 2018 she has atypical chest discomfort, rush of heat and fluttering and dizziness, irregular heart beats, no syncope or chest pain. s/p ILR explant/reimplant [facility], scar healing well. She still feels episodes of presyncope\par ECG shows SR

## 2021-04-20 NOTE — PHYSICAL EXAM
[Normal Appearance] : normal appearance [Normal Oral Mucosa] : normal oral mucosa [] : no respiratory distress [Respiration, Rhythm And Depth] : normal respiratory rhythm and effort [Heart Rate And Rhythm] : heart rate and rhythm were normal [Heart Sounds] : normal S1 and S2 [Bowel Sounds] : normal bowel sounds [Skin Color & Pigmentation] : normal skin color and pigmentation [Skin Turgor] : normal skin turgor [Oriented To Time, Place, And Person] : oriented to person, place, and time [Impaired Insight] : insight and judgment were intact

## 2021-05-03 ENCOUNTER — APPOINTMENT (OUTPATIENT)
Dept: DISASTER EMERGENCY | Facility: CLINIC | Age: 86
End: 2021-05-03

## 2021-05-04 LAB — SARS-COV-2 N GENE NPH QL NAA+PROBE: NOT DETECTED

## 2021-05-06 ENCOUNTER — OUTPATIENT (OUTPATIENT)
Dept: OUTPATIENT SERVICES | Facility: HOSPITAL | Age: 86
LOS: 1 days | Discharge: HOME | End: 2021-05-06
Payer: MEDICARE

## 2021-05-06 DIAGNOSIS — R42 DIZZINESS AND GIDDINESS: ICD-10-CM

## 2021-05-06 DIAGNOSIS — Z90.49 ACQUIRED ABSENCE OF OTHER SPECIFIED PARTS OF DIGESTIVE TRACT: Chronic | ICD-10-CM

## 2021-05-06 DIAGNOSIS — H25.093 OTHER AGE-RELATED INCIPIENT CATARACT, BILATERAL: Chronic | ICD-10-CM

## 2021-05-06 DIAGNOSIS — I47.1 SUPRAVENTRICULAR TACHYCARDIA: ICD-10-CM

## 2021-05-06 PROCEDURE — 93660 TILT TABLE EVALUATION: CPT | Mod: 26

## 2021-05-06 RX ORDER — DRONEDARONE 400 MG/1
400 TABLET, FILM COATED ORAL TWICE DAILY
Qty: 60 | Refills: 2 | Status: ACTIVE | COMMUNITY
Start: 2021-05-06 | End: 1900-01-01

## 2021-06-25 ENCOUNTER — NON-APPOINTMENT (OUTPATIENT)
Age: 86
End: 2021-06-25

## 2021-07-02 ENCOUNTER — NON-APPOINTMENT (OUTPATIENT)
Age: 86
End: 2021-07-02

## 2021-07-23 ENCOUNTER — ASOB RESULT (OUTPATIENT)
Age: 86
End: 2021-07-23

## 2021-07-23 ENCOUNTER — APPOINTMENT (OUTPATIENT)
Dept: OBGYN | Facility: CLINIC | Age: 86
End: 2021-07-23
Payer: MEDICARE

## 2021-07-23 PROCEDURE — 76856 US EXAM PELVIC COMPLETE: CPT

## 2021-07-23 PROCEDURE — 76830 TRANSVAGINAL US NON-OB: CPT

## 2021-07-27 ENCOUNTER — APPOINTMENT (OUTPATIENT)
Dept: DERMATOLOGY | Facility: CLINIC | Age: 86
End: 2021-07-27
Payer: MEDICARE

## 2021-07-27 DIAGNOSIS — L21.9 SEBORRHEIC DERMATITIS, UNSPECIFIED: ICD-10-CM

## 2021-07-27 PROCEDURE — 99204 OFFICE O/P NEW MOD 45 MIN: CPT

## 2021-07-27 NOTE — HISTORY OF PRESENT ILLNESS
[de-identified] : Pt. c/o itching on scalp, has been severe recently; \par no txs; \par has Hx of arthritis; \par

## 2021-07-27 NOTE — ASSESSMENT
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

## 2021-09-02 DIAGNOSIS — R92.2 INCONCLUSIVE MAMMOGRAM: ICD-10-CM

## 2021-12-08 ENCOUNTER — APPOINTMENT (OUTPATIENT)
Dept: OBGYN | Facility: CLINIC | Age: 86
End: 2021-12-08
Payer: MEDICARE

## 2021-12-08 VITALS
WEIGHT: 155 LBS | BODY MASS INDEX: 26.46 KG/M2 | HEIGHT: 64 IN | SYSTOLIC BLOOD PRESSURE: 102 MMHG | DIASTOLIC BLOOD PRESSURE: 60 MMHG

## 2021-12-08 DIAGNOSIS — R87.810 CERVICAL HIGH RISK HUMAN PAPILLOMAVIRUS (HPV) DNA TEST POSITIVE: ICD-10-CM

## 2021-12-08 DIAGNOSIS — R31.29 OTHER MICROSCOPIC HEMATURIA: ICD-10-CM

## 2021-12-08 DIAGNOSIS — Z12.11 ENCOUNTER FOR SCREENING FOR MALIGNANT NEOPLASM OF COLON: ICD-10-CM

## 2021-12-08 DIAGNOSIS — Z01.419 ENCOUNTER FOR GYNECOLOGICAL EXAMINATION (GENERAL) (ROUTINE) W/OUT ABNORMAL FINDINGS: ICD-10-CM

## 2021-12-08 PROCEDURE — G0101: CPT

## 2021-12-08 NOTE — PHYSICAL EXAM
[Appropriately responsive] : appropriately responsive [Alert] : alert [No Acute Distress] : no acute distress [Soft] : soft [Non-tender] : non-tender [Non-distended] : non-distended [No HSM] : No HSM [No Lesions] : no lesions [No Mass] : no mass [Oriented x3] : oriented x3 [Examination Of The Breasts] : a normal appearance [Breast Nipple Inversion] : inverted [No Discharge] : no discharge [___] : a [unfilled] ~Ucm area of erythema [No Masses] : no breast masses were palpable [Vulvar Atrophy] : vulvar atrophy [Labia Majora] : normal [Labia Minora] : normal [Atrophy] : atrophy [Normal] : normal [Uterine Adnexae] : normal [No Tenderness] : no tenderness [Nl Sphincter Tone] : normal sphincter tone [FreeTextEntry6] : LOOP RECORDER TIP PALPABLE AT LEFT STERNUM [FreeTextEntry9] : GUAIAC NEGATIVE

## 2021-12-08 NOTE — HISTORY OF PRESENT ILLNESS
[Patient reported mammogram was normal] : Patient reported mammogram was normal [Patient reported breast sonogram was normal] : Patient reported breast sonogram was normal [Patient reported PAP Smear was normal] : Patient reported PAP Smear was normal [Patient reported bone density results were abnormal] : Patient reported bone density results were abnormal [Previously active] : previously active [FreeTextEntry1] : TAKING DAILY PREDNISONE 2.5 MG NOW FOR LEFT HIP PAIN WITH SOME RELIEF.  DOES NOT WANT HIP SURGERY DUE TO ANESTHESIA, WORRIED RE:MENTAL STATUS AFTERWARDS.\par \par   WEARING LOOP RECORDER FOR HEART.  \par \par JUST FINISHED ANTIBIOTICS FOR "HEMATURIA" 2 WEEKS AGO, NO CURRENT URINARY SYMPTOMS, FOR TEST OF CURE.\par \par DISCUSSED PRECAUTIONS AGAINST COVID19, INCLUDING MASK WEARING, SOCIAL DISTANCING AND HAND WASHING.\par VACCINATED X 2.\par \par Risks and benefits of screening colonoscopy discussed with patient. Family history reviewed.  Discussed COLOGUARD DNA stool testing for cancer. Patient information released to [facility] as per patient request.\par  [Mammogramdate] : 11/2021 [TextBox_19] : HETEROGENEOUSLY DENSE [BreastSonogramDate] : 11/2021 [PapSmeardate] : 11/2020 [TextBox_31] : hx HR HPV, PAP NEGATIVE 2020 [BoneDensityDate] : 2/2021 [TextBox_37] : CONTINUED IMPROVEMENT OF OSTEOPENIA AT HIP [TextBox_43] : "NO MORE"

## 2021-12-09 LAB
APPEARANCE: CLEAR
BACTERIA: NEGATIVE
BILIRUBIN URINE: NEGATIVE
BLOOD URINE: NEGATIVE
COLOR: NORMAL
GLUCOSE QUALITATIVE U: NEGATIVE
HYALINE CASTS: 1 /LPF
KETONES URINE: NEGATIVE
LEUKOCYTE ESTERASE URINE: NEGATIVE
MICROSCOPIC-UA: NORMAL
NITRITE URINE: NEGATIVE
PH URINE: 6.5
PROTEIN URINE: NEGATIVE
RED BLOOD CELLS URINE: 1 /HPF
SPECIFIC GRAVITY URINE: 1.01
SQUAMOUS EPITHELIAL CELLS: 0 /HPF
UROBILINOGEN URINE: NORMAL
WHITE BLOOD CELLS URINE: 2 /HPF

## 2021-12-14 LAB
BACTERIA UR CULT: NORMAL
CYTOLOGY CVX/VAG DOC THIN PREP: ABNORMAL

## 2021-12-20 ENCOUNTER — NON-APPOINTMENT (OUTPATIENT)
Age: 86
End: 2021-12-20

## 2022-06-03 ENCOUNTER — APPOINTMENT (OUTPATIENT)
Dept: DERMATOLOGY | Facility: CLINIC | Age: 87
End: 2022-06-03
Payer: MEDICARE

## 2022-06-03 DIAGNOSIS — R21 RASH AND OTHER NONSPECIFIC SKIN ERUPTION: ICD-10-CM

## 2022-06-03 DIAGNOSIS — L72.3 SEBACEOUS CYST: ICD-10-CM

## 2022-06-03 PROCEDURE — 99214 OFFICE O/P EST MOD 30 MIN: CPT | Mod: 25

## 2022-06-03 PROCEDURE — 10060 I&D ABSCESS SIMPLE/SINGLE: CPT

## 2022-06-03 NOTE — PHYSICAL EXAM
[FreeTextEntry3] : upper forehead/frontal hairline; \par large inflamed milium; \par \par \par + diffuse erythematous small papules over both hands, wrists;  none on forearms, face, neck

## 2022-06-03 NOTE — HISTORY OF PRESENT ILLNESS
[de-identified] : The patient has been fit in for urgent appointment.\par c/o itchy rash on hands, wrists;  uses hand ; \par also:  growing lesion on forehead; symptomatic

## 2022-06-03 NOTE — ASSESSMENT
[FreeTextEntry1] : I&D to large milium upper forehead; \par \par eczema/ICD; hands; \par \par Therapeutic options and their risks and benefits; along with multiple diagnostic possibilities were discussed at length; risks and benefits of further study were discussed;\par \par betamethasone AF cream BID prn;  hold hand ;  Dove soap only\par f/u if persists; \par

## 2022-06-18 ENCOUNTER — EMERGENCY (EMERGENCY)
Facility: HOSPITAL | Age: 87
LOS: 0 days | Discharge: HOME | End: 2022-06-18
Attending: EMERGENCY MEDICINE
Payer: MEDICARE

## 2022-06-18 VITALS
OXYGEN SATURATION: 94 % | DIASTOLIC BLOOD PRESSURE: 85 MMHG | SYSTOLIC BLOOD PRESSURE: 157 MMHG | HEART RATE: 58 BPM | TEMPERATURE: 98 F | RESPIRATION RATE: 17 BRPM

## 2022-06-18 VITALS — WEIGHT: 160.94 LBS | HEIGHT: 65 IN

## 2022-06-18 DIAGNOSIS — Z23 ENCOUNTER FOR IMMUNIZATION: ICD-10-CM

## 2022-06-18 DIAGNOSIS — M06.9 RHEUMATOID ARTHRITIS, UNSPECIFIED: ICD-10-CM

## 2022-06-18 DIAGNOSIS — S91.342A PUNCTURE WOUND WITH FOREIGN BODY, LEFT FOOT, INITIAL ENCOUNTER: ICD-10-CM

## 2022-06-18 DIAGNOSIS — Z79.82 LONG TERM (CURRENT) USE OF ASPIRIN: ICD-10-CM

## 2022-06-18 DIAGNOSIS — H25.093 OTHER AGE-RELATED INCIPIENT CATARACT, BILATERAL: Chronic | ICD-10-CM

## 2022-06-18 DIAGNOSIS — Z88.2 ALLERGY STATUS TO SULFONAMIDES: ICD-10-CM

## 2022-06-18 DIAGNOSIS — Z90.49 ACQUIRED ABSENCE OF OTHER SPECIFIED PARTS OF DIGESTIVE TRACT: Chronic | ICD-10-CM

## 2022-06-18 DIAGNOSIS — Z91.041 RADIOGRAPHIC DYE ALLERGY STATUS: ICD-10-CM

## 2022-06-18 DIAGNOSIS — Z88.0 ALLERGY STATUS TO PENICILLIN: ICD-10-CM
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
## 2022-06-18 PROCEDURE — 99283 EMERGENCY DEPT VISIT LOW MDM: CPT | Mod: FS

## 2022-06-18 PROCEDURE — 73620 X-RAY EXAM OF FOOT: CPT | Mod: 26,LT

## 2022-06-18 PROCEDURE — 99283 EMERGENCY DEPT VISIT LOW MDM: CPT | Mod: 25

## 2022-06-18 PROCEDURE — 90715 TDAP VACCINE 7 YRS/> IM: CPT

## 2022-06-18 PROCEDURE — 73620 X-RAY EXAM OF FOOT: CPT | Mod: LT

## 2022-06-18 RX ORDER — CIPROFLOXACIN LACTATE 400MG/40ML
500 VIAL (ML) INTRAVENOUS ONCE
Refills: 0 | Status: COMPLETED | OUTPATIENT
Start: 2022-06-18 | End: 2022-06-18

## 2022-06-18 RX ORDER — TETANUS TOXOID, REDUCED DIPHTHERIA TOXOID AND ACELLULAR PERTUSSIS VACCINE, ADSORBED 5; 2.5; 8; 8; 2.5 [IU]/.5ML; [IU]/.5ML; UG/.5ML; UG/.5ML; UG/.5ML
0.5 SUSPENSION INTRAMUSCULAR ONCE
Refills: 0 | Status: COMPLETED | OUTPATIENT
Start: 2022-06-18 | End: 2022-06-18

## 2022-06-18 RX ORDER — CIPROFLOXACIN LACTATE 400MG/40ML
1 VIAL (ML) INTRAVENOUS
Qty: 5 | Refills: 0
Start: 2022-06-18 | End: 2022-06-20

## 2022-06-18 RX ADMIN — TETANUS TOXOID, REDUCED DIPHTHERIA TOXOID AND ACELLULAR PERTUSSIS VACCINE, ADSORBED 0.5 MILLILITER(S): 5; 2.5; 8; 8; 2.5 SUSPENSION INTRAMUSCULAR at 22:44

## 2022-06-18 RX ADMIN — Medication 500 MILLIGRAM(S): at 22:43

## 2022-06-18 NOTE — ED STATDOCS - PROGRESS NOTE DETAILS
signed Coleen Fallon PA-C Pt seen initially in intake by Dr Quan.  87F BIB friend for puncture wound of left foot. pt accidentally stepped on a nail through her slipper in her closet at home today. +puncture wound. No significant findings on xray. Will give cipro and update tetanus. f/u PMD and podiatry. return precautions given. Pt feeling well, pt and friend agree with DC and plan of care.

## 2022-06-18 NOTE — ED STATDOCS - OBJECTIVE STATEMENT
87 year old female with PMH of RA, appendectomy, cataracts presents with cc of having a nail go through her flip flops and puncturing her left foot by accident. No loc. No ankle or foot pain other than some burning sensation at the puncture site. No knee pain, hip pain, difficulty ambulating. No visual or focal neurological complaints. No cp, sob or palpitation. No fever or chills. No skin rash. No melena or hematochezia. No dysuria hematuria or frequency. No visual or focal neurological complaints.

## 2022-06-18 NOTE — ED STATDOCS - PATIENT PORTAL LINK FT
You can access the FollowMyHealth Patient Portal offered by [facility] by registering at the following website: [email or web address]. By joining [facility]’s FollowMyHealth portal, you will also be able to view your health information using other applications (apps) compatible with our system.

## 2022-06-18 NOTE — ED STATDOCS - MUSCULOSKELETAL, MLM
range of motion is not limited and there is no muscle tenderness. 5/5 strength on flexion and extension of all limbs. No nuchal rigidity. No saddle anesthesia.

## 2022-06-18 NOTE — ED STATDOCS - CARE PLAN
1 Principal Discharge DX:	Puncture wound of left foot   Principal Discharge DX:	Puncture wound of left foot  Goal:	abx provided, outpatient follow up.

## 2022-06-18 NOTE — ED ADULT TRIAGE NOTE - CHIEF COMPLAINT QUOTE
Pt presents to ED for multiple medical complaints. Pt states that she stepped on a nail this evening  injuring right foot. pt also endorses a rash to left wrist.

## 2022-06-18 NOTE — ED STATDOCS - NS ED ATTENDING STATEMENT MOD
This was a shared visit with the JUSTICE. I reviewed and verified the documentation and independently performed the documented: detailed exam warm and dry/color normal

## 2022-06-18 NOTE — ED STATDOCS - SKIN, MLM
skin normal color for race, warm, dry and intact. puncture wound, no FB noted on the sole of the foot.

## 2022-06-18 NOTE — ED STATDOCS - CARE PROVIDER_API CALL
Saeed Mason J  ORTHOPAEDIC SURGERY  [address] [place] [address]  Phone: [phone]  Fax: [phone]  Follow Up Time:

## 2022-06-18 NOTE — ED STATDOCS - NSFOLLOWUPINSTRUCTIONS_ED_ALL_ED_FT
Please return to us immediately if you have any worsening of your foot pain. Please note that when you have a nail puncture the food that you are at a higher risk of infection due to the nail introducing potential bacteria in the deeper tissue. Please note that the most common bacteria that can cause an infection is called "pseudomonas" which is a common water borne bacteria. So please note that we are going to give you the antibiotics called Ciprofloxacin. Cipro often can have drug-drug interactions so it is very important that you see your primary care physician and have them check on your drugs and make sure that your blood thinner Xarelto is functioning well. Please note that you need to return to us immediately if you develop any chest pain, shortness of breath or palpitation. Please avoid alcohol, smoking, and please contact your primary care as soon as possible.    For all other health concerns or if you can not see your primary care physician or a podiatrist return to us immediately.    _________      Puncture Wound      A puncture wound is an injury that is caused by a sharp, thin object that goes through (penetrates) your skin. Usually, a puncture wound does not leave a large opening in your skin, so it may not bleed a lot. However, when you get a puncture wound, dirt or other materials (foreign bodies) can be forced into your wound and can break off inside. This increases the chance of infection, such as tetanus. There are many sharp, pointed objects that can cause puncture wounds, including teeth, nails, splinters of glass, fishhooks, and needles.    Treatment may include washing out the wound with a germ-free (sterile) salt-water solution, having the wound opened surgically to remove a foreign object, closing the wound with stitches (sutures), and covering the wound with antibiotic ointment and a bandage (dressing). Depending on what caused the injury, you may also need a tetanus shot or a rabies shot.      Follow these instructions at home:    Medicines     •Take or apply over-the-counter and prescription medicines only as told by your health care provider.      •If you were prescribed an antibiotic medicine, take or apply it as told by your health care provider. Do not stop using the antibiotic even if your condition improves.      Bathing     •Keep the dressing dry as told by your health care provider.      • Do not take baths, swim, or use a hot tub until your health care provider approves. Ask your health care provider if you may take showers. You may only be allowed to take sponge baths.        Wound care    •There are many ways to close and cover a wound. For example, a wound can be closed with sutures, skin glue, or adhesive strips. Follow instructions from your health care provider about how to take care of your wound. Make sure you:  •Wash your hands with soap and water before and after you change your dressing. If soap and water are not available, use hand .      •Change your dressing as told by your health care provider.      •Leave sutures, skin glue, or adhesive strips in place. These skin closures may need to stay in place for 2 weeks or longer. If adhesive strip edges start to loosen and curl up, you may trim the loose edges. Do not remove adhesive strips completely unless your health care provider tells you to do that.        •Clean the wound as told by your health care provider.      • Do not scratch or pick at the wound.    •Check your wound every day for signs of infection. Check for:  •Redness, swelling, or pain.      •Fluid or blood.      •Warmth.      •Pus or a bad smell.        General instructions     •Raise (elevate) the injured area above the level of your heart while you are sitting or lying down.      •If your puncture wound is in your foot, ask your health care provider if you need to avoid putting weight on your foot and for how long. Do not use the injured limb to support your body weight until your health care provider says that you can. Use crutches as told by your health care provider.      •Keep all follow-up visits as told by your health care provider. This is important.        Contact a health care provider if:    •You received a tetanus shot and you have swelling, severe pain, redness, or bleeding at the injection site.      •You have a fever.      •Your sutures come out.      •You notice a bad smell coming from your wound or your dressing.      •You notice something coming out of your wound, such as wood or glass.      •Your pain is not controlled with medicine.      •You have increased redness, swelling, or pain at the site of your wound.      •You have fluid, blood, or pus coming from your wound.      •You notice a change in the color of your skin near your wound.      •You need to change the dressing frequently due to fluid, blood, or pus draining from your wound.      •You develop a new rash.      •You develop numbness around your wound.      •You have warmth around your wound.        Get help right away if:    •You develop severe swelling around your wound.      •Your pain suddenly increases and is severe.      •You develop painful skin lumps.      •You have a red streak going away from your wound.    •The wound is on your hand or foot and you:  •Cannot properly move a finger or toe.      •Notice that your fingers or toes look pale or bluish.          Summary    •A puncture wound is an injury that is caused by a sharp, thin object that goes through (penetrates) your skin.      •Treatment may include washing out the wound, having the wound opened surgically to remove a foreign object, closing the wound with stitches (sutures), and covering the wound with antibiotic ointment and a bandage (dressing).      •Follow instructions from your health care provider about how to take care of your wound.      •Contact a health care provider if you have increased redness, swelling, or pain at the site of your wound.      •Keep all follow-up visits as told by your health care provider. This is important.      This information is not intended to replace advice given to you by your health care provider. Make sure you discuss any questions you have with your health care provider.

## 2022-06-18 NOTE — ED STATDOCS - NSICDXPASTSURGICALHX_GEN_ALL_CORE_FT
PAST SURGICAL HISTORY:  Age-related incipient cataract of both eyes surgery completed    History of appendectomy

## 2022-06-18 NOTE — ED STATDOCS - ATTENDING APP SHARED VISIT CONTRIBUTION OF CARE
I Ramirez Quan MD saw and examined the patient. MLP saw and examined the patient under my supervision. I discussed the care of the patient with MLP and agree with MLP's plan, assessment and care of the patient while in the ED.

## 2022-06-18 NOTE — ED ADULT NURSE NOTE - PRIMARY CARE PROVIDER
Patient cleared to participate in athletics without restrictions. Discussed the importance of stretching, adequate hydration, rest periods and sunscreen use. Sports physicals are not a substitute for routine physical exams by primary care provider. Parent retains physical exam form.  
Erika

## 2022-06-18 NOTE — ED STATDOCS - CLINICAL SUMMARY MEDICAL DECISION MAKING FREE TEXT BOX
Kadeem LONGORIA: nail through flip flop, abx provided, tdap, xray shows no acute fx, outpatient follow up with ortho strict return precautions.

## 2022-08-10 ENCOUNTER — NON-APPOINTMENT (OUTPATIENT)
Age: 87
End: 2022-08-10

## 2022-08-11 ENCOUNTER — EMERGENCY (EMERGENCY)
Facility: HOSPITAL | Age: 87
LOS: 0 days | Discharge: HOME | End: 2022-08-11
Attending: EMERGENCY MEDICINE
Payer: MEDICARE

## 2022-08-11 ENCOUNTER — TRANSCRIPTION ENCOUNTER (OUTPATIENT)
Age: 87
End: 2022-08-11

## 2022-08-11 VITALS
DIASTOLIC BLOOD PRESSURE: 86 MMHG | RESPIRATION RATE: 22 BRPM | OXYGEN SATURATION: 98 % | SYSTOLIC BLOOD PRESSURE: 148 MMHG | TEMPERATURE: 100 F

## 2022-08-11 VITALS — HEIGHT: 65 IN | WEIGHT: 158.07 LBS

## 2022-08-11 DIAGNOSIS — R63.8 OTHER SYMPTOMS AND SIGNS CONCERNING FOOD AND FLUID INTAKE: ICD-10-CM

## 2022-08-11 DIAGNOSIS — Z88.0 ALLERGY STATUS TO PENICILLIN: ICD-10-CM

## 2022-08-11 DIAGNOSIS — R53.83 OTHER FATIGUE: ICD-10-CM

## 2022-08-11 DIAGNOSIS — R53.1 WEAKNESS: ICD-10-CM

## 2022-08-11 DIAGNOSIS — Z88.2 ALLERGY STATUS TO SULFONAMIDES: ICD-10-CM

## 2022-08-11 DIAGNOSIS — Z79.82 LONG TERM (CURRENT) USE OF ASPIRIN: ICD-10-CM

## 2022-08-11 DIAGNOSIS — R09.81 NASAL CONGESTION: ICD-10-CM

## 2022-08-11 DIAGNOSIS — Z90.49 ACQUIRED ABSENCE OF OTHER SPECIFIED PARTS OF DIGESTIVE TRACT: Chronic | ICD-10-CM

## 2022-08-11 DIAGNOSIS — U07.1 COVID-19: ICD-10-CM

## 2022-08-11 DIAGNOSIS — H25.093 OTHER AGE-RELATED INCIPIENT CATARACT, BILATERAL: Chronic | ICD-10-CM

## 2022-08-11 DIAGNOSIS — M06.9 RHEUMATOID ARTHRITIS, UNSPECIFIED: ICD-10-CM

## 2022-08-11 LAB
BASOPHILS # BLD AUTO: 0.05 K/UL — SIGNIFICANT CHANGE UP (ref 0–0.2)
BASOPHILS NFR BLD AUTO: 1 % — SIGNIFICANT CHANGE UP (ref 0–2)
EOSINOPHIL # BLD AUTO: 0.16 K/UL — SIGNIFICANT CHANGE UP (ref 0–0.5)
EOSINOPHIL NFR BLD AUTO: 3 % — SIGNIFICANT CHANGE UP (ref 0–6)
HCT VFR BLD CALC: 48 % — HIGH (ref 34.5–45)
HGB BLD-MCNC: 16.5 G/DL — HIGH (ref 11.5–15.5)
LYMPHOCYTES # BLD AUTO: 0.65 K/UL — LOW (ref 1–3.3)
LYMPHOCYTES # BLD AUTO: 12 % — LOW (ref 13–44)
MCHC RBC-ENTMCNC: 31.7 PG — SIGNIFICANT CHANGE UP (ref 27–34)
MCHC RBC-ENTMCNC: 34.4 GM/DL — SIGNIFICANT CHANGE UP (ref 32–36)
MCV RBC AUTO: 92.3 FL — SIGNIFICANT CHANGE UP (ref 80–100)
MONOCYTES # BLD AUTO: 0.54 K/UL — SIGNIFICANT CHANGE UP (ref 0–0.9)
MONOCYTES NFR BLD AUTO: 10 % — SIGNIFICANT CHANGE UP (ref 2–14)
NEUTROPHILS # BLD AUTO: 3.41 K/UL — SIGNIFICANT CHANGE UP (ref 1.8–7.4)
NEUTROPHILS NFR BLD AUTO: 63 % — SIGNIFICANT CHANGE UP (ref 43–77)
NRBC # BLD: SIGNIFICANT CHANGE UP /100 WBCS (ref 0–0)
PLATELET # BLD AUTO: 100 K/UL — LOW (ref 150–400)
RBC # BLD: 5.2 M/UL — SIGNIFICANT CHANGE UP (ref 3.8–5.2)
RBC # FLD: 13.2 % — SIGNIFICANT CHANGE UP (ref 10.3–14.5)
WBC # BLD: 5.42 K/UL — SIGNIFICANT CHANGE UP (ref 3.8–10.5)
WBC # FLD AUTO: 5.42 K/UL — SIGNIFICANT CHANGE UP (ref 3.8–10.5)

## 2022-08-11 PROCEDURE — 80053 COMPREHEN METABOLIC PANEL: CPT

## 2022-08-11 PROCEDURE — 99284 EMERGENCY DEPT VISIT MOD MDM: CPT | Mod: FS,CS

## 2022-08-11 PROCEDURE — 96374 THER/PROPH/DIAG INJ IV PUSH: CPT

## 2022-08-11 PROCEDURE — 85025 COMPLETE CBC W/AUTO DIFF WBC: CPT

## 2022-08-11 PROCEDURE — M0222: CPT

## 2022-08-11 PROCEDURE — 36415 COLL VENOUS BLD VENIPUNCTURE: CPT

## 2022-08-11 RX ORDER — ACETAMINOPHEN 500 MG
650 TABLET ORAL ONCE
Refills: 0 | Status: COMPLETED | OUTPATIENT
Start: 2022-08-11 | End: 2022-08-11

## 2022-08-11 RX ORDER — SODIUM CHLORIDE 9 MG/ML
1000 INJECTION INTRAMUSCULAR; INTRAVENOUS; SUBCUTANEOUS ONCE
Refills: 0 | Status: COMPLETED | OUTPATIENT
Start: 2022-08-11 | End: 2022-08-11

## 2022-08-11 RX ORDER — BEBTELOVIMAB 87.5 MG/ML
175 INJECTION, SOLUTION INTRAVENOUS ONCE
Refills: 0 | Status: COMPLETED | OUTPATIENT
Start: 2022-08-11 | End: 2022-08-11

## 2022-08-11 RX ORDER — ONDANSETRON 8 MG/1
4 TABLET, FILM COATED ORAL ONCE
Refills: 0 | Status: COMPLETED | OUTPATIENT
Start: 2022-08-11 | End: 2022-08-11

## 2022-08-11 RX ADMIN — SODIUM CHLORIDE 1000 MILLILITER(S): 9 INJECTION INTRAMUSCULAR; INTRAVENOUS; SUBCUTANEOUS at 14:12

## 2022-08-11 RX ADMIN — Medication 650 MILLIGRAM(S): at 16:00

## 2022-08-11 RX ADMIN — BEBTELOVIMAB 175 MILLIGRAM(S): 87.5 INJECTION, SOLUTION INTRAVENOUS at 14:15

## 2022-08-11 RX ADMIN — ONDANSETRON 4 MILLIGRAM(S): 8 TABLET, FILM COATED ORAL at 14:12

## 2022-08-11 NOTE — ED STATDOCS - OBJECTIVE STATEMENT
86 y/o female with a PMHx of RA presents to the ED 88 y/o female with a PMHx of RA presents to the ED about 5-6 days sinus congestion no significant cough sore throat gone to concert for 3 days over weekend now feels very fatigue lack of appetite went to urgent care today pos COVID told to come for MA infusions given hx of RA

## 2022-08-11 NOTE — ED STATDOCS - PATIENT PORTAL LINK FT
You can access the FollowMyHealth Patient Portal offered by [facility] by registering at the following website: [email or web address]. By joining [facility]’s FollowMyHealth portal, you will also be able to view your health information using other applications (apps) compatible with our system.

## 2022-08-11 NOTE — ED STATDOCS - NS ED ATTENDING STATEMENT MOD
This was a shared visit with the JUSTICE. I reviewed and verified the documentation and independently performed the documented:

## 2022-08-11 NOTE — ED STATDOCS - NSFOLLOWUPINSTRUCTIONS_ED_ALL_ED_FT
Follow up with your primary care doctor for further evaluation of your symptoms.     Return to the ER for any new or other concerns.

## 2022-08-11 NOTE — ED STATDOCS - PROGRESS NOTE DETAILS
86 yo female with a PMH of RA on prednisone, A fib on xarelto, htn presents with decreased appetite, weakness/fatigue since Saturday. Went to urgent care today and was tested positive for covid. Spoke to her doctor who recommended her to come for MAB infusion. Saqib azar, sob.  -Abraham Shah PA-C FIlled out online form and consent that pt signed for MAB. -Abraham Shah PA-C Vitals stable. Pt feelign well. Will d/c home. -Abraham Shah PA-C

## 2022-08-11 NOTE — ED STATDOCS - CARE PLAN
1 Principal Discharge DX:	2019 novel coronavirus disease (COVID-19)  Secondary Diagnosis:	Weakness  Secondary Diagnosis:	Decreased appetite

## 2022-08-11 NOTE — ED STATDOCS - CLINICAL SUMMARY MEDICAL DECISION MAKING FREE TEXT BOX
plan evaluation for dehydration/electrolyte abnormality provide MA VS stable d/c home. plan: evaluation for dehydration/electrolyte abnormality provide MAB. AVSS.  Anticipate d/c home.

## 2022-08-18 ENCOUNTER — NON-APPOINTMENT (OUTPATIENT)
Age: 87
End: 2022-08-18

## 2022-08-21 ENCOUNTER — NON-APPOINTMENT (OUTPATIENT)
Age: 87
End: 2022-08-21

## 2022-09-14 ENCOUNTER — NON-APPOINTMENT (OUTPATIENT)
Age: 87
End: 2022-09-14

## 2022-12-14 ENCOUNTER — APPOINTMENT (OUTPATIENT)
Dept: OBGYN | Facility: CLINIC | Age: 87
End: 2022-12-14

## 2022-12-14 VITALS
WEIGHT: 162 LBS | HEIGHT: 64 IN | BODY MASS INDEX: 27.66 KG/M2 | DIASTOLIC BLOOD PRESSURE: 70 MMHG | SYSTOLIC BLOOD PRESSURE: 122 MMHG

## 2022-12-14 DIAGNOSIS — Z12.39 ENCOUNTER FOR OTHER SCREENING FOR MALIGNANT NEOPLASM OF BREAST: ICD-10-CM

## 2022-12-14 PROCEDURE — 99214 OFFICE O/P EST MOD 30 MIN: CPT

## 2022-12-14 NOTE — PHYSICAL EXAM
[Appropriately responsive] : appropriately responsive [Alert] : alert [No Acute Distress] : no acute distress [Soft] : soft [Non-tender] : non-tender [Non-distended] : non-distended [No HSM] : No HSM [No Lesions] : no lesions [No Mass] : no mass [Oriented x3] : oriented x3 [FreeTextEntry6] : LOOP RECORDER TIP PALPABLE AT LEFT STERNUM [Examination Of The Breasts] : a normal appearance [Breast Nipple Inversion] : inverted [No Discharge] : no discharge [___] : a [unfilled] ~Ucm area of erythema [No Masses] : no breast masses were palpable [Vulvar Atrophy] : vulvar atrophy [Labia Majora] : normal [Labia Minora] : normal [Atrophy] : atrophy [Normal] : normal [Uterine Adnexae] : normal [Exam Deferred] : was deferred [FreeTextEntry9] : HEMORRHOIDS, EXTERNAL, PAINFUL

## 2022-12-14 NOTE — PLAN
[FreeTextEntry1] : Discussed genitourinary syndrome of menopause include urinary and  vaginal symptoms of infection, inflammation, dyspareunia, incontinence and bleeding. Evaluation including cultures and possible sonogram broached.   Therapies including dietary intervention, antibiotics, local oil, estrogen or DHEA application, physical therapy and urogynecology referrals discussed.\par CONSIDER VAGINAL E2 THERAPY.\par \par SCREENING FOR, SIGNS AND SYMPTOMS OF AND PREVENTION OF STD'S D/W PATIENT.\par TO CALL IF DATING.\par \par MANAGEMENT OF PAP SMEAR AND D/W PATIENT.  OPTIONS INCLUDE YEARLY PAP VS. Q 3 YEARS.  RISKS OF OVER-TREATMENT OF BENIGN DISEASE VERSUS "MISSING" CERVICAL DISEASE DISCUSSED.\par HX OF HR HPV, NORMAL PAST 6 YEARS, FOR F/U Q 2 YEARS UNLESS BLEEDING.

## 2022-12-14 NOTE — HISTORY OF PRESENT ILLNESS
[TextBox_4] : Annual [Mammogramdate] : 11/3/22 [TextBox_19] : br 2 [TextBox_25] : br 2 [PapSmeardate] : 12/8/21 [TextBox_31] : atrophic [BoneDensityDate] : 2/25/21 [TextBox_37] : osteopenia [ColonoscopyDate] : 2021 [TextBox_43] : RADHA DRAPER [LMPDate] : 1978 [TextBox_6] : 1978 [FreeTextEntry1] : 1978 [Previously active] : previously active [No] : No [FreeTextEntry2] :

## 2022-12-21 ENCOUNTER — APPOINTMENT (OUTPATIENT)
Dept: DERMATOLOGY | Facility: CLINIC | Age: 87
End: 2022-12-21

## 2022-12-21 DIAGNOSIS — L60.3 NAIL DYSTROPHY: ICD-10-CM

## 2022-12-21 DIAGNOSIS — L65.9 NONSCARRING HAIR LOSS, UNSPECIFIED: ICD-10-CM

## 2022-12-21 DIAGNOSIS — L82.0 INFLAMED SEBORRHEIC KERATOSIS: ICD-10-CM

## 2022-12-21 PROCEDURE — 99214 OFFICE O/P EST MOD 30 MIN: CPT | Mod: 25

## 2022-12-21 PROCEDURE — 17110 DESTRUCTION B9 LES UP TO 14: CPT

## 2022-12-21 NOTE — HISTORY OF PRESENT ILLNESS
[FreeTextEntry1] : Hair loss and fragile nails. [de-identified] : Progressive over months.  She has rheumatoid arthritis, notes she has numerous medications.\par Hx of synthroid tx, unsure if she's had any thyroid labs drawn in a few years.\par Also with lesions on the left back, with itching and irritation.

## 2022-12-21 NOTE — ASSESSMENT
[FreeTextEntry1] : Alopecia with brittle fingernails.\par Check CBC and TSH.\par If WNL's, will consider scalp bx.\par Current meds listed unlikely to be an issue, but will check if additional meds with patients rheumatologist (MD Sherron).\par Will call patient with blood test results when available.

## 2022-12-21 NOTE — PHYSICAL EXAM
[Alert] : alert [Oriented x 3] : ~L oriented x 3 [Well Nourished] : well nourished [FreeTextEntry3] : Scalp WNL's.\par Gentle hair pluck unremarkable.\par Mildly dry hair texture.\par \par Fingernails with vertical ridging.\par Trace fracturing distally.\par \par +/- thyromegaly.\par \par Greasy brown erythematous papules, x 4, left low back.

## 2022-12-29 ENCOUNTER — NON-APPOINTMENT (OUTPATIENT)
Age: 87
End: 2022-12-29

## 2022-12-30 RX ORDER — MOMETASONE FUROATE 1 MG/ML
0.1 SOLUTION TOPICAL
Qty: 1 | Refills: 1 | Status: DISCONTINUED | COMMUNITY
Start: 2021-07-27 | End: 2022-12-30

## 2022-12-30 RX ORDER — METOPROLOL SUCCINATE 100 MG/1
100 TABLET, EXTENDED RELEASE ORAL
Refills: 0 | Status: ACTIVE | COMMUNITY

## 2022-12-30 RX ORDER — FOLIC ACID 1 MG/1
1 TABLET ORAL
Refills: 0 | Status: ACTIVE | COMMUNITY

## 2022-12-30 RX ORDER — UBIDECARENONE/VIT E ACET 100MG-5
CAPSULE ORAL
Refills: 0 | Status: DISCONTINUED | COMMUNITY
End: 2022-12-30

## 2022-12-30 RX ORDER — ERGOCALCIFEROL 1.25 MG/1
CAPSULE ORAL
Refills: 0 | Status: ACTIVE | COMMUNITY

## 2022-12-30 RX ORDER — DICLOFENAC SODIUM 10 MG/G
1 GEL TOPICAL
Refills: 0 | Status: ACTIVE | COMMUNITY

## 2022-12-30 RX ORDER — FOLIC ACID-PYRIDOXINE-CYANOCOBALAMIN TAB 2.5-25-2 MG 2.5-25-2 MG
2.5-25-2 TAB ORAL
Qty: 30 | Refills: 0 | Status: DISCONTINUED | COMMUNITY
Start: 2017-05-11 | End: 2022-12-30

## 2023-01-10 ENCOUNTER — APPOINTMENT (OUTPATIENT)
Dept: DERMATOLOGY | Facility: CLINIC | Age: 88
End: 2023-01-10

## 2023-03-10 NOTE — ASU PREOP CHECKLIST - AS BP NONINV METHOD
73 y.o F with hx of dementia presents to ED for R eye swelling. HPI limited as patient does not answer questions or follow commands. CT scan was done which showed R orbital floor fracture, unclear etiology. ENT consulted for evaluation.   
electronic

## 2023-04-17 ENCOUNTER — APPOINTMENT (OUTPATIENT)
Dept: OBGYN | Facility: CLINIC | Age: 88
End: 2023-04-17
Payer: MEDICARE

## 2023-04-17 VITALS
BODY MASS INDEX: 28.17 KG/M2 | HEIGHT: 64 IN | WEIGHT: 165 LBS | SYSTOLIC BLOOD PRESSURE: 140 MMHG | DIASTOLIC BLOOD PRESSURE: 80 MMHG

## 2023-04-17 DIAGNOSIS — R10.2 PELVIC AND PERINEAL PAIN: ICD-10-CM

## 2023-04-17 PROCEDURE — 99213 OFFICE O/P EST LOW 20 MIN: CPT

## 2023-04-17 PROCEDURE — 81003 URINALYSIS AUTO W/O SCOPE: CPT | Mod: QW

## 2023-04-17 NOTE — DISCUSSION/SUMMARY
[FreeTextEntry1] : Differential for pelvic pain was reviewed, based on her exam I do not think GYN in origin. \par RTO 1-2 weeks for TVUS to eval uterus and ovaries. \par Urine culture to rule out UTI. \par Avoid constipation, PRN miralax recommended. \par

## 2023-04-17 NOTE — PHYSICAL EXAM
[Appropriately responsive] : appropriately responsive [Alert] : alert [No Acute Distress] : no acute distress [Oriented x3] : oriented x3 [Labia Majora] : normal [Labia Minora] : normal [Atrophy] : atrophy [No Bleeding] : There was no active vaginal bleeding [Normal] : normal [Uterine Adnexae] : non-palpable

## 2023-04-17 NOTE — HISTORY OF PRESENT ILLNESS
[FreeTextEntry1] : Pastora presents for acute onset pelvic pain, this happened yesterday and was in low back and lower abdomen. Now today she feels fine. Denies vaginal bleeding. These symptoms have only been present once prior, but otherwise she has not noticed any pelvic pain recently. \par She has constipation and has to strain for BM.\par

## 2023-04-18 LAB
BILIRUB UR QL STRIP: NORMAL
GLUCOSE UR-MCNC: NORMAL
HCG UR QL: 0.2 EU/DL
HGB UR QL STRIP.AUTO: NORMAL
KETONES UR-MCNC: NORMAL
LEUKOCYTE ESTERASE UR QL STRIP: NORMAL
NITRITE UR QL STRIP: NORMAL
PH UR STRIP: 6
PROT UR STRIP-MCNC: NORMAL
SP GR UR STRIP: 1.01

## 2023-04-19 LAB — BACTERIA UR CULT: NORMAL

## 2023-05-01 ENCOUNTER — APPOINTMENT (OUTPATIENT)
Dept: ANTEPARTUM | Facility: CLINIC | Age: 88
End: 2023-05-01

## 2023-05-01 ENCOUNTER — APPOINTMENT (OUTPATIENT)
Dept: OBGYN | Facility: CLINIC | Age: 88
End: 2023-05-01

## 2023-07-28 NOTE — PATIENT PROFILE ADULT. - SUPPORT PERSON NAME
ID Progress Note      SUBJECTIVE:    More awake. C/O R leg pain. No new complaints    OBJECTIVE:  Tmax Temp (24hrs), Av.8 °F (36.6 °C), Min:97.2 °F (36.2 °C), Max:98.6 °F (37 °C)     Last Vitals   Vitals:    22 1108   BP: (!) 140/77   Pulse: 80   Resp: 19   Temp: 98.6 °F (37 °C)              Gen: Well developed, well nourished, obese. Not in distress  HEENT: EOMI, AMILCAR, No oral lesions  Neck:  Supple, No JVD, No bruits, No LAP.  CVS:  HR - RRR, S1, S2. No murmur  Lung: Clear to auscultation, no dullness to percussion.  Abd:  Soft, Non-tender. No organomegaly, No palpable masses, NABS  : Unremarkable  Extr: Bilateral 1+ edema of the lower extremities.  The right pretibial area just above the ankle there is slight erythema.  There is no blistering.  The right lateral aspect of the knee joint area and the lower thigh has significant ecchymosis.  There is no significant swelling.  There is slight tenderness but no crepitus. No change  Neuro: No focal deficits  Skin: No rashes or ulcerations.         MEDICATIONS;    As per MAR and reviewed      LABS AND IMAGING: REVIEWED    Recent Labs     22  0602   WBC 12.7*   RBC 3.89*   HGB 11.7*   HCT 32.2*            -------------------------------------------------------------------------     ASSESSMENT:       # Cellulitis of the right lower extremity  # Traumatic ecchymosis of the right leg with no significant findings to suggest hematoma  # Severe hyponatremia  # Hypothermia  # Transaminitis- due to rhabdomyolysis  # History of seizure  # History of hypothyroidism  # Abnormal CT of the esophagus      Recommendation:     # Cont cefazlin   # Monitor renal function closely  # Spoke to the pt's Son    Lorraine TAVAREZPeg Luna MD  3/2/2022     No see above

## 2023-09-12 ENCOUNTER — APPOINTMENT (OUTPATIENT)
Dept: OBGYN | Facility: CLINIC | Age: 88
End: 2023-09-12
Payer: MEDICARE

## 2023-09-12 VITALS
DIASTOLIC BLOOD PRESSURE: 68 MMHG | BODY MASS INDEX: 27.49 KG/M2 | WEIGHT: 161 LBS | SYSTOLIC BLOOD PRESSURE: 120 MMHG | HEIGHT: 64 IN

## 2023-09-12 DIAGNOSIS — N64.4 MASTODYNIA: ICD-10-CM

## 2023-09-12 PROCEDURE — 99213 OFFICE O/P EST LOW 20 MIN: CPT

## 2023-09-27 ENCOUNTER — APPOINTMENT (OUTPATIENT)
Dept: GASTROENTEROLOGY | Facility: CLINIC | Age: 88
End: 2023-09-27
Payer: MEDICARE

## 2023-09-27 VITALS
HEIGHT: 64 IN | SYSTOLIC BLOOD PRESSURE: 128 MMHG | WEIGHT: 159 LBS | BODY MASS INDEX: 27.14 KG/M2 | DIASTOLIC BLOOD PRESSURE: 68 MMHG

## 2023-09-27 DIAGNOSIS — K59.00 CONSTIPATION, UNSPECIFIED: ICD-10-CM

## 2023-09-27 DIAGNOSIS — R19.5 OTHER FECAL ABNORMALITIES: ICD-10-CM

## 2023-09-27 DIAGNOSIS — R10.12 LEFT UPPER QUADRANT PAIN: ICD-10-CM

## 2023-09-27 PROCEDURE — 99204 OFFICE O/P NEW MOD 45 MIN: CPT

## 2023-10-24 ENCOUNTER — NON-APPOINTMENT (OUTPATIENT)
Age: 88
End: 2023-10-24

## 2023-11-05 ENCOUNTER — NON-APPOINTMENT (OUTPATIENT)
Age: 88
End: 2023-11-05

## 2023-11-06 ENCOUNTER — NON-APPOINTMENT (OUTPATIENT)
Age: 88
End: 2023-11-06

## 2023-11-09 NOTE — ED PROVIDER NOTE - DATE/TIME 1
01-Jul-2018 00:31 Topical Sulfur Applications Counseling: Topical Sulfur Counseling: Patient counseled that this medication may cause skin irritation or allergic reactions.  In the event of skin irritation, the patient was advised to reduce the amount of the drug applied or use it less frequently.   The patient verbalized understanding of the proper use and possible adverse effects of topical sulfur application.  All of the patient's questions and concerns were addressed.

## 2023-11-15 ENCOUNTER — APPOINTMENT (OUTPATIENT)
Dept: GASTROENTEROLOGY | Facility: CLINIC | Age: 88
End: 2023-11-15
Payer: MEDICARE

## 2023-11-15 DIAGNOSIS — K64.9 UNSPECIFIED HEMORRHOIDS: ICD-10-CM

## 2023-11-15 DIAGNOSIS — R63.0 ANOREXIA: ICD-10-CM

## 2023-11-15 DIAGNOSIS — U07.1 COVID-19: ICD-10-CM

## 2023-11-15 DIAGNOSIS — I10 ESSENTIAL (PRIMARY) HYPERTENSION: ICD-10-CM

## 2023-11-15 DIAGNOSIS — R06.02 SHORTNESS OF BREATH: ICD-10-CM

## 2023-11-15 PROCEDURE — 99215 OFFICE O/P EST HI 40 MIN: CPT

## 2023-11-28 ENCOUNTER — APPOINTMENT (OUTPATIENT)
Dept: OBGYN | Facility: CLINIC | Age: 88
End: 2023-11-28
Payer: MEDICARE

## 2023-11-28 PROCEDURE — 99214 OFFICE O/P EST MOD 30 MIN: CPT

## 2023-11-28 RX ORDER — MIDODRINE HYDROCHLORIDE 5 MG/1
5 TABLET ORAL
Refills: 0 | Status: DISCONTINUED | COMMUNITY
End: 2023-11-28

## 2023-11-28 RX ORDER — RALOXIFENE HYDROCHLORIDE 60 MG/1
60 TABLET, FILM COATED ORAL
Qty: 90 | Refills: 3 | Status: ACTIVE | COMMUNITY
Start: 1900-01-01 | End: 1900-01-01

## 2023-11-28 RX ORDER — RIVAROXABAN 20 MG/1
20 TABLET, FILM COATED ORAL
Refills: 0 | Status: DISCONTINUED | COMMUNITY
End: 2023-11-28

## 2023-11-28 RX ORDER — TAMSULOSIN HYDROCHLORIDE 0.4 MG/1
0.4 CAPSULE ORAL
Refills: 0 | Status: DISCONTINUED | COMMUNITY
End: 2023-11-28

## 2023-11-28 RX ORDER — APIXABAN 2.5 MG/1
2.5 TABLET, FILM COATED ORAL
Refills: 0 | Status: ACTIVE | COMMUNITY

## 2023-12-05 ENCOUNTER — OUTPATIENT (OUTPATIENT)
Dept: OUTPATIENT SERVICES | Facility: HOSPITAL | Age: 88
LOS: 1 days | End: 2023-12-05
Payer: MEDICARE

## 2023-12-05 ENCOUNTER — APPOINTMENT (OUTPATIENT)
Dept: RADIOLOGY | Facility: CLINIC | Age: 88
End: 2023-12-05
Payer: MEDICARE

## 2023-12-05 ENCOUNTER — APPOINTMENT (OUTPATIENT)
Dept: PHYSICAL MEDICINE AND REHAB | Facility: CLINIC | Age: 88
End: 2023-12-05
Payer: MEDICARE

## 2023-12-05 ENCOUNTER — RESULT REVIEW (OUTPATIENT)
Age: 88
End: 2023-12-05

## 2023-12-05 DIAGNOSIS — M25.512 PAIN IN RIGHT SHOULDER: ICD-10-CM

## 2023-12-05 DIAGNOSIS — Z90.49 ACQUIRED ABSENCE OF OTHER SPECIFIED PARTS OF DIGESTIVE TRACT: Chronic | ICD-10-CM

## 2023-12-05 DIAGNOSIS — G89.29 PAIN IN RIGHT SHOULDER: ICD-10-CM

## 2023-12-05 DIAGNOSIS — M25.511 PAIN IN RIGHT SHOULDER: ICD-10-CM

## 2023-12-05 DIAGNOSIS — M47.816 SPONDYLOSIS W/OUT MYELOPATHY OR RADICULOPATHY, LUMBAR REGION: ICD-10-CM

## 2023-12-05 DIAGNOSIS — M47.816 SPONDYLOSIS WITHOUT MYELOPATHY OR RADICULOPATHY, LUMBAR REGION: ICD-10-CM

## 2023-12-05 PROCEDURE — 72110 X-RAY EXAM L-2 SPINE 4/>VWS: CPT

## 2023-12-05 PROCEDURE — 99204 OFFICE O/P NEW MOD 45 MIN: CPT

## 2023-12-05 PROCEDURE — 72110 X-RAY EXAM L-2 SPINE 4/>VWS: CPT | Mod: 26

## 2023-12-11 NOTE — ED PROVIDER NOTE - RESPIRATORY, MLM
I cancelled her Urine for microalbumin.  Can send levothyroxine for another month  to give her time to do her labs   Breath sounds clear and equal bilaterally.

## 2024-01-09 ENCOUNTER — APPOINTMENT (OUTPATIENT)
Dept: OBGYN | Facility: CLINIC | Age: 89
End: 2024-01-09
Payer: MEDICARE

## 2024-01-09 VITALS
BODY MASS INDEX: 27.66 KG/M2 | DIASTOLIC BLOOD PRESSURE: 62 MMHG | SYSTOLIC BLOOD PRESSURE: 118 MMHG | WEIGHT: 162 LBS | HEIGHT: 64 IN

## 2024-01-09 DIAGNOSIS — N63.0 UNSPECIFIED LUMP IN UNSPECIFIED BREAST: ICD-10-CM

## 2024-01-09 DIAGNOSIS — M85.80 OTHER SPECIFIED DISORDERS OF BONE DENSITY AND STRUCTURE, UNSPECIFIED SITE: ICD-10-CM

## 2024-01-09 DIAGNOSIS — N95.2 POSTMENOPAUSAL ATROPHIC VAGINITIS: ICD-10-CM

## 2024-01-09 PROCEDURE — 99214 OFFICE O/P EST MOD 30 MIN: CPT

## 2024-01-09 RX ORDER — PANTOPRAZOLE SODIUM 40 MG/1
GRANULE, DELAYED RELEASE ORAL
Refills: 0 | Status: ACTIVE | COMMUNITY

## 2024-01-12 NOTE — ED ADULT NURSE NOTE - NSFALLRSKUNASSIST_ED_ALL_ED
CHIEF COMPLAINT:    Interval Events:    REVIEW OF SYSTEMS:  Constitutional: [ ] fevers [ ] chills [ ] weight loss [ ] weight gain  HEENT: [ ] dry eyes [ ] eye irritation [ ] postnasal drip [ ] nasal congestion  CV: [ ] chest pain [ ] orthopnea [ ] palpitations [ ] murmur  Resp: [ ] cough [ ] shortness of breath [ ] dyspnea [ ] wheezing [ ] sputum [ ] hemoptysis  GI: [ ] nausea [ ] vomiting [ ] diarrhea [ ] constipation [ ] abd pain [ ] dysphagia   : [ ] dysuria [ ] nocturia [ ] hematuria [ ] increased urinary frequency  Musculoskeletal: [ ] back pain [ ] myalgias [ ] arthralgias [ ] fracture  Skin: [ ] rash [ ] itch  Neurological: [ ] headache [ ] dizziness [ ] syncope [ ] weakness [ ] numbness  Hematologic/Lymphatic: [ ] anemia [ ] bleeding problem  Allergic/Immunologic: [ ] itchy eyes [ ] nasal discharge [ ] hives [ ] angioedema  [ ] All other systems negative  [ ] Unable to assess ROS because ________    OBJECTIVE:  ICU Vital Signs Last 24 Hrs  T(C): 36.6 (12 Jan 2024 04:00), Max: 37.4 (11 Jan 2024 10:04)  T(F): 97.9 (12 Jan 2024 04:00), Max: 99.3 (11 Jan 2024 10:04)  HR: 48 (12 Jan 2024 07:00) (43 - 93)  BP: 122/73 (12 Jan 2024 07:00) (115/71 - 153/104)  BP(mean): 87 (12 Jan 2024 07:00) (84 - 104)  ABP: --  ABP(mean): --  RR: 21 (12 Jan 2024 07:00) (19 - 24)  SpO2: 99% (12 Jan 2024 07:00) (96% - 100%)    O2 Parameters below as of 12 Jan 2024 07:00  Patient On (Oxygen Delivery Method): nasal cannula  O2 Flow (L/min): 2            01-11 @ 07:01  -  01-12 @ 07:00  --------------------------------------------------------  IN: 1929.5 mL / OUT: 0 mL / NET: 1929.5 mL    01-12 @ 07:01  -  01-12 @ 08:05  --------------------------------------------------------  IN: 116.8 mL / OUT: 0 mL / NET: 116.8 mL      CAPILLARY BLOOD GLUCOSE      POCT Blood Glucose.: 117 mg/dL (11 Jan 2024 18:32)      PHYSICAL EXAM:  General:   HEENT:   Neck:   Respiratory:   Cardiovascular:   Abdomen:   Extremities:   Skin:   Neurological:  Psychiatry:    LINES:    HOSPITAL MEDICATIONS:  MEDICATIONS  (STANDING):  buprenorphine 8 mG/naloxone 2 mG SL Film 1 Film(s) SubLingual daily  chlorhexidine 2% Cloths 1 Application(s) Topical <User Schedule>  dexMEDEtomidine Infusion 0.3 MICROgram(s)/kG/Hr (5.1 mL/Hr) IV Continuous <Continuous>  dextrose 5% + sodium chloride 0.45%. 1000 milliLiter(s) (100 mL/Hr) IV Continuous <Continuous>  enoxaparin Injectable 40 milliGRAM(s) SubCutaneous every 24 hours  LORazepam   Injectable 4 milliGRAM(s) IV Push every 6 hours  mupirocin 2% Ointment 1 Application(s) Topical two times a day  potassium chloride  10 mEq/100 mL IVPB 10 milliEquivalent(s) IV Intermittent every 1 hour    MEDICATIONS  (PRN):  haloperidol    Injectable 5 milliGRAM(s) IV Push every 6 hours PRN agitation  LORazepam   Injectable 2 milliGRAM(s) IV Push every 2 hours PRN Breakthrough Agitation given Acute Benzo withdrawal  ondansetron Injectable 4 milliGRAM(s) IV Push every 8 hours PRN Nausea and/or Vomiting      LABS:                        10.2   4.88  )-----------( 161      ( 12 Jan 2024 03:00 )             32.3     Hgb Trend: 10.2<--, 11.7<--, 13.0<--  01-12    147<H>  |  117<H>  |  32<H>  ----------------------------<  118<H>  3.2<L>   |  17<L>  |  0.82    Ca    9.2      12 Jan 2024 03:00  Phos  3.5     01-12  Mg     2.4     01-12    TPro  6.4  /  Alb  0.1<L>  /  TBili  0.6  /  DBili  x   /  AST  11<L>  /  ALT  16  /  AlkPhos  52  01-12      Urinalysis Basic - ( 12 Jan 2024 03:00 )    Color: x / Appearance: x / SG: x / pH: x  Gluc: 118 mg/dL / Ketone: x  / Bili: x / Urobili: x   Blood: x / Protein: x / Nitrite: x   Leuk Esterase: x / RBC: x / WBC x   Sq Epi: x / Non Sq Epi: x / Bacteria: x            MICROBIOLOGY:     RADIOLOGY:  [ ] Reviewed and interpreted by me no CHIEF COMPLAINT:    Interval Events:  has been calm o/n on precedex  s/p I&D L forearm    REVIEW OF SYSTEMS:  [ x] Unable to assess ROS because lethargic/sedated    OBJECTIVE:  ICU Vital Signs Last 24 Hrs  T(C): 36.6 (12 Jan 2024 04:00), Max: 37.4 (11 Jan 2024 10:04)  T(F): 97.9 (12 Jan 2024 04:00), Max: 99.3 (11 Jan 2024 10:04)  HR: 48 (12 Jan 2024 07:00) (43 - 93)  BP: 122/73 (12 Jan 2024 07:00) (115/71 - 153/104)  BP(mean): 87 (12 Jan 2024 07:00) (84 - 104)  ABP: --  ABP(mean): --  RR: 21 (12 Jan 2024 07:00) (19 - 24)  SpO2: 99% (12 Jan 2024 07:00) (96% - 100%)    O2 Parameters below as of 12 Jan 2024 07:00  Patient On (Oxygen Delivery Method): nasal cannula  O2 Flow (L/min): 2            01-11 @ 07:01 - 01-12 @ 07:00  --------------------------------------------------------  IN: 1929.5 mL / OUT: 0 mL / NET: 1929.5 mL    01-12 @ 07:01 - 01-12 @ 08:05  --------------------------------------------------------  IN: 116.8 mL / OUT: 0 mL / NET: 116.8 mL      CAPILLARY BLOOD GLUCOSE      POCT Blood Glucose.: 117 mg/dL (11 Jan 2024 18:32)      PHYSICAL EXAM:  General: NAD, non toxic appearing  HEENT: dry MM  Neck: supple  Respiratory: diminished BS  Cardiovascular: s1s2 RRR  Abdomen: soft, non tender, non distended  Extremities: warm, no edema or clubbing  Skin: multiple track marks, s/p I&D of L forearm  Neurological: moves all extremities  Psychiatry: unable to assess    LINES:  [facility] [place]    HOSPITAL MEDICATIONS:  MEDICATIONS  (STANDING):  buprenorphine 8 mG/naloxone 2 mG SL Film 1 Film(s) SubLingual daily  chlorhexidine 2% Cloths 1 Application(s) Topical <User Schedule>  dexMEDEtomidine Infusion 0.3 MICROgram(s)/kG/Hr (5.1 mL/Hr) IV Continuous <Continuous>  dextrose 5% + sodium chloride 0.45%. 1000 milliLiter(s) (100 mL/Hr) IV Continuous <Continuous>  enoxaparin Injectable 40 milliGRAM(s) SubCutaneous every 24 hours  LORazepam   Injectable 4 milliGRAM(s) IV Push every 6 hours  mupirocin 2% Ointment 1 Application(s) Topical two times a day  potassium chloride  10 mEq/100 mL IVPB 10 milliEquivalent(s) IV Intermittent every 1 hour    MEDICATIONS  (PRN):  haloperidol    Injectable 5 milliGRAM(s) IV Push every 6 hours PRN agitation  LORazepam   Injectable 2 milliGRAM(s) IV Push every 2 hours PRN Breakthrough Agitation given Acute Benzo withdrawal  ondansetron Injectable 4 milliGRAM(s) IV Push every 8 hours PRN Nausea and/or Vomiting      LABS:                        10.2   4.88  )-----------( 161      ( 12 Jan 2024 03:00 )             32.3     Hgb Trend: 10.2<--, 11.7<--, 13.0<--  01-12    147<H>  |  117<H>  |  32<H>  ----------------------------<  118<H>  3.2<L>   |  17<L>  |  0.82    Ca    9.2      12 Jan 2024 03:00  Phos  3.5     01-12  Mg     2.4     01-12    TPro  6.4  /  Alb  0.1<L>  /  TBili  0.6  /  DBili  x   /  AST  11<L>  /  ALT  16  /  AlkPhos  52  01-12      Urinalysis Basic - ( 12 Jan 2024 03:00 )    Color: x / Appearance: x / SG: x / pH: x  Gluc: 118 mg/dL / Ketone: x  / Bili: x / Urobili: x   Blood: x / Protein: x / Nitrite: x   Leuk Esterase: x / RBC: x / WBC x   Sq Epi: x / Non Sq Epi: x / Bacteria: x            MICROBIOLOGY:   Culture - Abscess with Gram Stain (01.11.24 @ 18:52)    Gram Stain:   No polymorphonuclear cells seen per low power field  No organisms seen per oil power field   Specimen Source: .Abscess Arm - Left        RADIOLOGY:  [ ] Reviewed and interpreted by me CHIEF COMPLAINT:    Interval Events:  has been calm o/n on precedex  s/p I&D L forearm    REVIEW OF SYSTEMS:  [ x] Unable to assess ROS because lethargic/sedated    OBJECTIVE:  ICU Vital Signs Last 24 Hrs  T(C): 36.6 (12 Jan 2024 04:00), Max: 37.4 (11 Jan 2024 10:04)  T(F): 97.9 (12 Jan 2024 04:00), Max: 99.3 (11 Jan 2024 10:04)  HR: 48 (12 Jan 2024 07:00) (43 - 93)  BP: 122/73 (12 Jan 2024 07:00) (115/71 - 153/104)  BP(mean): 87 (12 Jan 2024 07:00) (84 - 104)  ABP: --  ABP(mean): --  RR: 21 (12 Jan 2024 07:00) (19 - 24)  SpO2: 99% (12 Jan 2024 07:00) (96% - 100%)    O2 Parameters below as of 12 Jan 2024 07:00  Patient On (Oxygen Delivery Method): nasal cannula  O2 Flow (L/min): 2            01-11 @ 07:01 - 01-12 @ 07:00  --------------------------------------------------------  IN: 1929.5 mL / OUT: 0 mL / NET: 1929.5 mL    01-12 @ 07:01 - 01-12 @ 08:05  --------------------------------------------------------  IN: 116.8 mL / OUT: 0 mL / NET: 116.8 mL      CAPILLARY BLOOD GLUCOSE      POCT Blood Glucose.: 117 mg/dL (11 Jan 2024 18:32)      PHYSICAL EXAM:  General: NAD, non toxic appearing  HEENT: dry MM  Neck: supple  Respiratory: diminished BS  Cardiovascular: s1s2 RRR  Abdomen: soft, non tender, non distended  Extremities: warm, no edema or clubbing  Skin: multiple track marks, s/p I&D of L forearm  Neurological: moves all extremities  Psychiatry: unable to assess    LINES:  [facility]    HOSPITAL MEDICATIONS:  MEDICATIONS  (STANDING):  buprenorphine 8 mG/naloxone 2 mG SL Film 1 Film(s) SubLingual daily  chlorhexidine 2% Cloths 1 Application(s) Topical <User Schedule>  dexMEDEtomidine Infusion 0.3 MICROgram(s)/kG/Hr (5.1 mL/Hr) IV Continuous <Continuous>  dextrose 5% + sodium chloride 0.45%. 1000 milliLiter(s) (100 mL/Hr) IV Continuous <Continuous>  enoxaparin Injectable 40 milliGRAM(s) SubCutaneous every 24 hours  LORazepam   Injectable 4 milliGRAM(s) IV Push every 6 hours  mupirocin 2% Ointment 1 Application(s) Topical two times a day  potassium chloride  10 mEq/100 mL IVPB 10 milliEquivalent(s) IV Intermittent every 1 hour    MEDICATIONS  (PRN):  haloperidol    Injectable 5 milliGRAM(s) IV Push every 6 hours PRN agitation  LORazepam   Injectable 2 milliGRAM(s) IV Push every 2 hours PRN Breakthrough Agitation given Acute Benzo withdrawal  ondansetron Injectable 4 milliGRAM(s) IV Push every 8 hours PRN Nausea and/or Vomiting      LABS:                        10.2   4.88  )-----------( 161      ( 12 Jan 2024 03:00 )             32.3     Hgb Trend: 10.2<--, 11.7<--, 13.0<--  01-12    147<H>  |  117<H>  |  32<H>  ----------------------------<  118<H>  3.2<L>   |  17<L>  |  0.82    Ca    9.2      12 Jan 2024 03:00  Phos  3.5     01-12  Mg     2.4     01-12    TPro  6.4  /  Alb  0.1<L>  /  TBili  0.6  /  DBili  x   /  AST  11<L>  /  ALT  16  /  AlkPhos  52  01-12      Urinalysis Basic - ( 12 Jan 2024 03:00 )    Color: x / Appearance: x / SG: x / pH: x  Gluc: 118 mg/dL / Ketone: x  / Bili: x / Urobili: x   Blood: x / Protein: x / Nitrite: x   Leuk Esterase: x / RBC: x / WBC x   Sq Epi: x / Non Sq Epi: x / Bacteria: x            MICROBIOLOGY:   Culture - Abscess with Gram Stain (01.11.24 @ 18:52)    Gram Stain:   No polymorphonuclear cells seen per low power field  No organisms seen per oil power field   Specimen Source: .Abscess Arm - Left        RADIOLOGY:  [ ] Reviewed and interpreted by me

## 2024-01-24 ENCOUNTER — APPOINTMENT (OUTPATIENT)
Dept: GASTROENTEROLOGY | Facility: CLINIC | Age: 89
End: 2024-01-24

## 2024-01-30 ENCOUNTER — APPOINTMENT (OUTPATIENT)
Dept: VASCULAR SURGERY | Facility: CLINIC | Age: 89
End: 2024-01-30
Payer: MEDICARE

## 2024-01-30 VITALS
HEART RATE: 58 BPM | SYSTOLIC BLOOD PRESSURE: 166 MMHG | DIASTOLIC BLOOD PRESSURE: 79 MMHG | BODY MASS INDEX: 26.8 KG/M2 | HEIGHT: 64 IN | OXYGEN SATURATION: 97 % | WEIGHT: 157 LBS

## 2024-01-30 DIAGNOSIS — I87.2 VENOUS INSUFFICIENCY (CHRONIC) (PERIPHERAL): ICD-10-CM

## 2024-01-30 PROCEDURE — 99204 OFFICE O/P NEW MOD 45 MIN: CPT

## 2024-01-30 PROCEDURE — 93970 EXTREMITY STUDY: CPT

## 2024-01-30 NOTE — HISTORY OF PRESENT ILLNESS
[FreeTextEntry1] : 88F presents for leg swelling which has been present for a long time. The symptoms vary and do not change with the time of day. She has worn compression stockings but feels that they are too dificult to put on. She denies claudication, rest pain or tissue loss. She has not seen her cardiologst recently.

## 2024-01-30 NOTE — PHYSICAL EXAM
[0] : left 0 [2+] : left 2+ [No Rash or Lesion] : No rash or lesion [Alert] : alert [Oriented to Person] : oriented to person [Oriented to Place] : oriented to place [Oriented to Time] : oriented to time [Calm] : calm [de-identified] : NAD [de-identified] : WNL

## 2024-01-30 NOTE — ASSESSMENT
[FreeTextEntry1] : 88F with leg swelling, VI studies show only R sided small segment of GSV reflux. She is more interested in symptom management.  - Patient presents with lymphedema. Despite efforts of compression, elevation and exercise for a period of over 4 weeks, symptoms persist, and lymphedema extends proximally into truncal region. Early signs of hyperpigmentation noted.

## 2024-02-13 ENCOUNTER — APPOINTMENT (OUTPATIENT)
Dept: OBGYN | Facility: CLINIC | Age: 89
End: 2024-02-13

## 2024-04-05 ENCOUNTER — EMERGENCY (EMERGENCY)
Facility: HOSPITAL | Age: 89
LOS: 0 days | Discharge: HOME | End: 2024-04-05
Attending: STUDENT IN AN ORGANIZED HEALTH CARE EDUCATION/TRAINING PROGRAM
Payer: MEDICARE

## 2024-04-05 VITALS
SYSTOLIC BLOOD PRESSURE: 143 MMHG | TEMPERATURE: 98 F | DIASTOLIC BLOOD PRESSURE: 72 MMHG | RESPIRATION RATE: 19 BRPM | HEART RATE: 67 BPM | OXYGEN SATURATION: 98 %

## 2024-04-05 VITALS
TEMPERATURE: 98 F | DIASTOLIC BLOOD PRESSURE: 65 MMHG | SYSTOLIC BLOOD PRESSURE: 149 MMHG | HEIGHT: 64 IN | HEART RATE: 53 BPM | WEIGHT: 158.07 LBS | OXYGEN SATURATION: 99 % | RESPIRATION RATE: 18 BRPM

## 2024-04-05 DIAGNOSIS — Z91.041 RADIOGRAPHIC DYE ALLERGY STATUS: ICD-10-CM

## 2024-04-05 DIAGNOSIS — K21.9 GASTRO-ESOPHAGEAL REFLUX DISEASE WITHOUT ESOPHAGITIS: ICD-10-CM

## 2024-04-05 DIAGNOSIS — H25.093 OTHER AGE-RELATED INCIPIENT CATARACT, BILATERAL: Chronic | ICD-10-CM

## 2024-04-05 DIAGNOSIS — K31.89 OTHER DISEASES OF STOMACH AND DUODENUM: ICD-10-CM

## 2024-04-05 DIAGNOSIS — Z88.0 ALLERGY STATUS TO PENICILLIN: ICD-10-CM

## 2024-04-05 DIAGNOSIS — M06.9 RHEUMATOID ARTHRITIS, UNSPECIFIED: ICD-10-CM

## 2024-04-05 DIAGNOSIS — Z88.2 ALLERGY STATUS TO SULFONAMIDES: ICD-10-CM

## 2024-04-05 DIAGNOSIS — Z90.49 ACQUIRED ABSENCE OF OTHER SPECIFIED PARTS OF DIGESTIVE TRACT: Chronic | ICD-10-CM

## 2024-04-05 DIAGNOSIS — Z79.01 LONG TERM (CURRENT) USE OF ANTICOAGULANTS: ICD-10-CM

## 2024-04-05 DIAGNOSIS — R00.1 BRADYCARDIA, UNSPECIFIED: ICD-10-CM

## 2024-04-05 LAB
ALBUMIN SERPL ELPH-MCNC: 3.1 G/DL — LOW (ref 3.3–5)
ALP SERPL-CCNC: 64 U/L — SIGNIFICANT CHANGE UP (ref 40–120)
ALT FLD-CCNC: 11 U/L — LOW (ref 12–78)
ANION GAP SERPL CALC-SCNC: 5 MMOL/L — SIGNIFICANT CHANGE UP (ref 5–17)
APTT BLD: 31.7 SEC — SIGNIFICANT CHANGE UP (ref 24.5–35.6)
AST SERPL-CCNC: 18 U/L — SIGNIFICANT CHANGE UP (ref 15–37)
BASOPHILS # BLD AUTO: 0.06 K/UL — SIGNIFICANT CHANGE UP (ref 0–0.2)
BASOPHILS NFR BLD AUTO: 0.6 % — SIGNIFICANT CHANGE UP (ref 0–2)
BILIRUB SERPL-MCNC: 0.4 MG/DL — SIGNIFICANT CHANGE UP (ref 0.2–1.2)
BUN SERPL-MCNC: 18 MG/DL — SIGNIFICANT CHANGE UP (ref 7–23)
CALCIUM SERPL-MCNC: 9.7 MG/DL — SIGNIFICANT CHANGE UP (ref 8.5–10.1)
CHLORIDE SERPL-SCNC: 110 MMOL/L — HIGH (ref 96–108)
CO2 SERPL-SCNC: 25 MMOL/L — SIGNIFICANT CHANGE UP (ref 22–31)
CREAT SERPL-MCNC: 1.05 MG/DL — SIGNIFICANT CHANGE UP (ref 0.5–1.3)
EGFR: 51 ML/MIN/1.73M2 — LOW
EOSINOPHIL # BLD AUTO: 0.24 K/UL — SIGNIFICANT CHANGE UP (ref 0–0.5)
EOSINOPHIL NFR BLD AUTO: 2.5 % — SIGNIFICANT CHANGE UP (ref 0–6)
GLUCOSE SERPL-MCNC: 88 MG/DL — SIGNIFICANT CHANGE UP (ref 70–99)
HCT VFR BLD CALC: 44.8 % — SIGNIFICANT CHANGE UP (ref 34.5–45)
HGB BLD-MCNC: 15.1 G/DL — SIGNIFICANT CHANGE UP (ref 11.5–15.5)
IMM GRANULOCYTES NFR BLD AUTO: 0.3 % — SIGNIFICANT CHANGE UP (ref 0–0.9)
INR BLD: 1.29 RATIO — HIGH (ref 0.85–1.18)
LIDOCAIN IGE QN: 26 U/L — SIGNIFICANT CHANGE UP (ref 13–75)
LYMPHOCYTES # BLD AUTO: 3.04 K/UL — SIGNIFICANT CHANGE UP (ref 1–3.3)
LYMPHOCYTES # BLD AUTO: 31.2 % — SIGNIFICANT CHANGE UP (ref 13–44)
MCHC RBC-ENTMCNC: 31.4 PG — SIGNIFICANT CHANGE UP (ref 27–34)
MCHC RBC-ENTMCNC: 33.7 GM/DL — SIGNIFICANT CHANGE UP (ref 32–36)
MCV RBC AUTO: 93.1 FL — SIGNIFICANT CHANGE UP (ref 80–100)
MONOCYTES # BLD AUTO: 0.71 K/UL — SIGNIFICANT CHANGE UP (ref 0–0.9)
MONOCYTES NFR BLD AUTO: 7.3 % — SIGNIFICANT CHANGE UP (ref 2–14)
NEUTROPHILS # BLD AUTO: 5.66 K/UL — SIGNIFICANT CHANGE UP (ref 1.8–7.4)
NEUTROPHILS NFR BLD AUTO: 58.1 % — SIGNIFICANT CHANGE UP (ref 43–77)
PLATELET # BLD AUTO: 156 K/UL — SIGNIFICANT CHANGE UP (ref 150–400)
POTASSIUM SERPL-MCNC: 3.6 MMOL/L — SIGNIFICANT CHANGE UP (ref 3.5–5.3)
POTASSIUM SERPL-SCNC: 3.6 MMOL/L — SIGNIFICANT CHANGE UP (ref 3.5–5.3)
PROT SERPL-MCNC: 7.3 GM/DL — SIGNIFICANT CHANGE UP (ref 6–8.3)
PROTHROM AB SERPL-ACNC: 14.5 SEC — HIGH (ref 9.5–13)
RBC # BLD: 4.81 M/UL — SIGNIFICANT CHANGE UP (ref 3.8–5.2)
RBC # FLD: 13.3 % — SIGNIFICANT CHANGE UP (ref 10.3–14.5)
SODIUM SERPL-SCNC: 140 MMOL/L — SIGNIFICANT CHANGE UP (ref 135–145)
WBC # BLD: 9.74 K/UL — SIGNIFICANT CHANGE UP (ref 3.8–10.5)
WBC # FLD AUTO: 9.74 K/UL — SIGNIFICANT CHANGE UP (ref 3.8–10.5)

## 2024-04-05 PROCEDURE — 93005 ELECTROCARDIOGRAM TRACING: CPT

## 2024-04-05 PROCEDURE — 99285 EMERGENCY DEPT VISIT HI MDM: CPT | Mod: 25

## 2024-04-05 PROCEDURE — 85025 COMPLETE CBC W/AUTO DIFF WBC: CPT

## 2024-04-05 PROCEDURE — 83690 ASSAY OF LIPASE: CPT

## 2024-04-05 PROCEDURE — 93010 ELECTROCARDIOGRAM REPORT: CPT

## 2024-04-05 PROCEDURE — 80053 COMPREHEN METABOLIC PANEL: CPT

## 2024-04-05 PROCEDURE — 99285 EMERGENCY DEPT VISIT HI MDM: CPT

## 2024-04-05 PROCEDURE — 74176 CT ABD & PELVIS W/O CONTRAST: CPT | Mod: MC

## 2024-04-05 PROCEDURE — 85610 PROTHROMBIN TIME: CPT

## 2024-04-05 PROCEDURE — 36415 COLL VENOUS BLD VENIPUNCTURE: CPT

## 2024-04-05 PROCEDURE — 74176 CT ABD & PELVIS W/O CONTRAST: CPT | Mod: 26,MC

## 2024-04-05 PROCEDURE — 85730 THROMBOPLASTIN TIME PARTIAL: CPT

## 2024-04-05 RX ORDER — SODIUM CHLORIDE 9 MG/ML
1000 INJECTION INTRAMUSCULAR; INTRAVENOUS; SUBCUTANEOUS ONCE
Refills: 0 | Status: COMPLETED | OUTPATIENT
Start: 2024-04-05 | End: 2024-04-05

## 2024-04-05 RX ORDER — ONDANSETRON 8 MG/1
4 TABLET, FILM COATED ORAL ONCE
Refills: 0 | Status: COMPLETED | OUTPATIENT
Start: 2024-04-05 | End: 2024-04-05

## 2024-04-05 RX ORDER — ONDANSETRON 8 MG/1
4 TABLET, FILM COATED ORAL ONCE
Refills: 0 | Status: DISCONTINUED | OUTPATIENT
Start: 2024-04-05 | End: 2024-04-05

## 2024-04-05 RX ORDER — FAMOTIDINE 10 MG/ML
20 INJECTION INTRAVENOUS DAILY
Refills: 0 | Status: DISCONTINUED | OUTPATIENT
Start: 2024-04-05 | End: 2024-04-05

## 2024-04-05 RX ORDER — ONDANSETRON 8 MG/1
1 TABLET, FILM COATED ORAL
Qty: 20 | Refills: 0
Start: 2024-04-05

## 2024-04-05 RX ADMIN — FAMOTIDINE 20 MILLIGRAM(S): 10 INJECTION INTRAVENOUS at 19:45

## 2024-04-05 RX ADMIN — SODIUM CHLORIDE 1000 MILLILITER(S): 9 INJECTION INTRAMUSCULAR; INTRAVENOUS; SUBCUTANEOUS at 19:46

## 2024-04-05 RX ADMIN — ONDANSETRON 4 MILLIGRAM(S): 8 TABLET, FILM COATED ORAL at 22:37

## 2024-04-05 RX ADMIN — Medication 30 MILLILITER(S): at 19:45

## 2024-04-05 NOTE — ED ADULT NURSE NOTE - NSFALLUNIVINTERV_ED_ALL_ED
Bed/Stretcher in lowest position, wheels locked, appropriate side rails in place/Call bell, personal items and telephone in reach/Instruct patient to call for assistance before getting out of bed/chair/stretcher/Non-slip footwear applied when patient is off stretcher/[place] to call system/Physically safe environment - no spills, clutter or unnecessary equipment/Purposeful proactive rounding/Room/bathroom lighting operational, light cord in reach

## 2024-04-05 NOTE — ED PROVIDER NOTE - CLINICAL SUMMARY MEDICAL DECISION MAKING FREE TEXT BOX
Plan pain control, CT A/P, EKG, reassess. Plan pain control, CT A/P, EKG, reassess. Pt offered premedication for CT with IV contrast but she declines at this time, will proceed with CT without contrast. Labs including CBC and CMP grossly unremarkable. 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 Pt tolerating PO, is comfortable with discharge at this time, has f/u appt scheduled with her GI doctor on 5/15 but she call to make appt earlier. Family at bedside is comfortable with plan. Pt advised to continue taking her pepcid and protonix at home, understands she needs an endoscopy. Instructed to f/u with pcp, given strict return precautions and discharged home in stable condition with family

## 2024-04-05 NOTE — ED ADULT TRIAGE NOTE - CHIEF COMPLAINT QUOTE
Pt presents to the ED c/o dark stool x1 month. Denies fatigue, pain, or SOB. Pt takes Eliquis. Pt unable to get appointment with PCP and was told to come to the ED for further evaluation.

## 2024-04-05 NOTE — ED ADULT NURSE NOTE - OBJECTIVE STATEMENT
89 y/o alert female presents to ED for multiple medical complaints. Pt reports acid reflux, RLQ and LLQ pain, described it as cramping, on and off pain, abdomen soft in all four quadrants. At this time pt denies pain. Pt also reports dark brown stools, GUAIAC performed at bedside by MD Charles, GUAIC negative at bedside. PMHx GERD, rheumatoid arthritis, implanted cardiac monitor for "palpitations". Pt ambulatory to the bathroom, provided urine specimen. NAD. Will continue to monitor. 88 y/o alert female presents to ED for multiple medical complaints. Pt reports acid reflux, RLQ and LLQ pain, described it as cramping, on and off pain, abdomen soft in all four quadrants. At this time pt denies pain. Pt also reports dark brown stools, GUAIAC performed at bedside by MD Charles, GUAIC negative at bedside. PMHx GERD, rheumatoid arthritis, implanted cardiac monitor for "palpitations". Pt ambulatory to the bathroom, provided urine specimen. NAD. Will continue to monitor. Pt denies chest pain, SOB, N/V.

## 2024-04-05 NOTE — ED PROVIDER NOTE - NSFOLLOWUPINSTRUCTIONS_ED_ALL_ED_FT
Your blood work is normal. Your CT scan shows a stomach mass- please follow up with your primary care doctor and gastroenterologist within 1-2 days. Continue taking all prescribed medications. Return to the Emergency Department for worsening or persistent symptoms, and/or ANY NEW OR CONCERNING SYMPTOMS. If you have issues obtaining follow up, please call: [phone] or [phone]  to obtain a doctor or specialist who takes your insurance in your area.

## 2024-04-05 NOTE — ED PROVIDER NOTE - PATIENT PORTAL LINK FT
You can access the FollowMyHealth Patient Portal offered by [facility] by registering at the following website: [email or web address]. By joining [email or web address]’s FollowMyHealth portal, you will also be able to view your health information using other applications (apps) compatible with our system.

## 2024-04-05 NOTE — ED PROVIDER NOTE - PHYSICAL EXAMINATION
Constitutional: well appearing, NAD AAOx3  Eyes: EOMI, PERRL  Head: Normocephalic atraumatic  Mouth: no airway obstruction, posterior oropharynx clear without erythema or exudate  Neck: supple  Cardiac: regular rate and rhythm, no MRG  Resp: Lungs CTAB  GI: Abd s/nt/nd, no CVAT  Neuro: CN2-12 intact, strength 5/5x4, sensation grossly intact  Skin: No rashes

## 2024-04-05 NOTE — ED ADULT TRIAGE NOTE - CCCP TRG CHIEF CMPLNT
Date & Time: 10/18/2023, 11:56 AM  Patient: Maurice Leone      To Whom It May Concern:    Aryan Doty was seen and treated in our department on 10/13/2023.  He can return to school 10/23/24/    If you have any questions or concerns, please do not hesitate to call.        _____________________________  Physician/APC Signature
bloody stools

## 2024-04-05 NOTE — ED PROVIDER NOTE - PROGRESS NOTE DETAILS
Gerda Davies for attending Dr. Charles   Lot [id number] exp 9/30/2024: QC passed [facility] negative

## 2024-04-05 NOTE — ED PROVIDER NOTE - OBJECTIVE STATEMENT
88 y/o female with PMHx of RA; presents to ED c/o intermittent abdominal cramping, increased flatus, increased acid reflux, dark stools x1 month. +AC Eliquis. Also endorsing on-and-off diarrhea and hard stools. Denies chest pain, SOB. Unable to get appointment with PCP, told to go to ED for further evaluation. Denies chest pain. Allergy to IV contrast.

## 2024-04-17 ENCOUNTER — APPOINTMENT (OUTPATIENT)
Dept: GASTROENTEROLOGY | Facility: CLINIC | Age: 89
End: 2024-04-17
Payer: MEDICARE

## 2024-04-17 VITALS
WEIGHT: 158 LBS | DIASTOLIC BLOOD PRESSURE: 80 MMHG | BODY MASS INDEX: 26.98 KG/M2 | SYSTOLIC BLOOD PRESSURE: 128 MMHG | HEIGHT: 64 IN

## 2024-04-17 DIAGNOSIS — C43.9 MALIGNANT MELANOMA OF SKIN, UNSPECIFIED: ICD-10-CM

## 2024-04-17 DIAGNOSIS — R14.3 FLATULENCE: ICD-10-CM

## 2024-04-17 DIAGNOSIS — R19.7 DIARRHEA, UNSPECIFIED: ICD-10-CM

## 2024-04-17 DIAGNOSIS — M41.56 OTHER SECONDARY SCOLIOSIS, LUMBAR REGION: ICD-10-CM

## 2024-04-17 DIAGNOSIS — M54.6 PAIN IN THORACIC SPINE: ICD-10-CM

## 2024-04-17 DIAGNOSIS — K62.5 HEMORRHAGE OF ANUS AND RECTUM: ICD-10-CM

## 2024-04-17 DIAGNOSIS — R10.9 ABDOMINAL DISTENSION (GASEOUS): ICD-10-CM

## 2024-04-17 DIAGNOSIS — R14.1 GAS PAIN: ICD-10-CM

## 2024-04-17 DIAGNOSIS — R93.5 ABNORMAL FINDINGS ON DIAGNOSTIC IMAGING OF OTHER ABDOMINAL REGIONS, INCLUDING RETROPERITONEUM: ICD-10-CM

## 2024-04-17 DIAGNOSIS — M47.812 SPONDYLOSIS W/OUT MYELOPATHY OR RADICULOPATHY, CERVICAL REGION: ICD-10-CM

## 2024-04-17 DIAGNOSIS — M06.9 RHEUMATOID ARTHRITIS, UNSPECIFIED: ICD-10-CM

## 2024-04-17 DIAGNOSIS — R14.0 ABDOMINAL DISTENSION (GASEOUS): ICD-10-CM

## 2024-04-17 DIAGNOSIS — K59.00 CONSTIPATION, UNSPECIFIED: ICD-10-CM

## 2024-04-17 PROCEDURE — G2211 COMPLEX E/M VISIT ADD ON: CPT

## 2024-04-17 PROCEDURE — 99215 OFFICE O/P EST HI 40 MIN: CPT

## 2024-04-17 NOTE — PHYSICAL EXAM
[Normal] : the sclera and conjunctiva were normal [Hearing Threshold Finger Rub Not [place]] : hearing was normal [Normal Appearance] : the appearance of the neck was normal [No Respiratory Distress] : no respiratory distress [Oriented To Time, Place, And Person] : oriented to person, place, and time

## 2024-04-17 NOTE — REASON FOR VISIT
[Follow-up] : a follow-up of an existing diagnosis [FreeTextEntry1] : constipation, gas pain, abnormal abdominal CT (i.e. exophytic gastric mass), abdominal pain

## 2024-04-17 NOTE — ASSESSMENT
[FreeTextEntry1] : Pleasant 89-year-old woman with a Hx of pacemaker and defibrillator placement (2018; on Eliquis), former cigarette smoking, RA, and interstitial lung disease presents for follow-up on constipation and abdominal pain.  Recently hospitalized ~3 weeks ago for black stool. Prior to hospitalization had constipation and gas pains alleviated by BM (was taking Pepto Bismol; suspects lactose intolerance). Recent abdominal CT (no oral or IV contrast) showed incidental finding of exophytic gastric mass. Recommend that the pt schedule a Barium Study, take MOM (daily) and Senna (2 tablets, every few days) to avoid constipation while on Pepto-Bismol and around the time of the Barium Study/Upper GI Series.  All questions answered Call with any questions or concerns Time spent before and after visit reviewing patient's chart

## 2024-04-17 NOTE — CONSULT LETTER
[Dear  ___] : Dear  [unfilled], [Courtesy Letter:] : I had the pleasure of seeing your patient, [unfilled], in my office today. [Please see my note below.] : Please see my note below. [Sincerely,] : Sincerely, [FreeTextEntry3] : Dr. Aurea Vazquez

## 2024-04-17 NOTE — HISTORY OF PRESENT ILLNESS
[FreeTextEntry1] : Pleasant 89-year-old woman with a Hx of pacemaker and defibrillator placement (2018; on Eliquis), former cigarette smoking, RA, and interstitial lung disease presents for follow-up on constipation and abdominal pain. Recently hospitalized ~3 weeks ago for black stool. Prior to hospitalization had constipation and gas pains alleviated by BM (was taking Pepto Bismol; suspects lactose intolerance). Recent abdominal CT (no oral or IV contrast) showed incidental finding of exophytic gastric mass.

## 2024-04-22 ENCOUNTER — INPATIENT (INPATIENT)
Facility: HOSPITAL | Age: 89
LOS: 7 days | Discharge: HOME HEALTH | DRG: 392 | End: 2024-04-30
Attending: SURGERY | Admitting: SURGERY
Payer: MEDICARE

## 2024-04-22 VITALS
SYSTOLIC BLOOD PRESSURE: 138 MMHG | HEART RATE: 63 BPM | OXYGEN SATURATION: 96 % | TEMPERATURE: 97 F | HEIGHT: 64 IN | WEIGHT: 158.07 LBS | RESPIRATION RATE: 18 BRPM | DIASTOLIC BLOOD PRESSURE: 58 MMHG

## 2024-04-22 DIAGNOSIS — Z90.49 ACQUIRED ABSENCE OF OTHER SPECIFIED PARTS OF DIGESTIVE TRACT: Chronic | ICD-10-CM

## 2024-04-22 DIAGNOSIS — H25.093 OTHER AGE-RELATED INCIPIENT CATARACT, BILATERAL: Chronic | ICD-10-CM

## 2024-04-22 DIAGNOSIS — K31.89 OTHER DISEASES OF STOMACH AND DUODENUM: ICD-10-CM

## 2024-04-22 LAB
ABO RH CONFIRMATION: SIGNIFICANT CHANGE UP
ALBUMIN SERPL ELPH-MCNC: 2.9 G/DL — LOW (ref 3.3–5)
ALP SERPL-CCNC: 64 U/L — SIGNIFICANT CHANGE UP (ref 40–120)
ALT FLD-CCNC: 12 U/L — SIGNIFICANT CHANGE UP (ref 12–78)
ANION GAP SERPL CALC-SCNC: 5 MMOL/L — SIGNIFICANT CHANGE UP (ref 5–17)
APPEARANCE UR: CLEAR — SIGNIFICANT CHANGE UP
APTT BLD: 30.4 SEC — SIGNIFICANT CHANGE UP (ref 24.5–35.6)
AST SERPL-CCNC: 15 U/L — SIGNIFICANT CHANGE UP (ref 15–37)
BACTERIA # UR AUTO: NEGATIVE /HPF — SIGNIFICANT CHANGE UP
BASOPHILS # BLD AUTO: 0.06 K/UL — SIGNIFICANT CHANGE UP (ref 0–0.2)
BASOPHILS NFR BLD AUTO: 0.5 % — SIGNIFICANT CHANGE UP (ref 0–2)
BILIRUB SERPL-MCNC: 0.4 MG/DL — SIGNIFICANT CHANGE UP (ref 0.2–1.2)
BILIRUB UR-MCNC: NEGATIVE — SIGNIFICANT CHANGE UP
BLD GP AB SCN SERPL QL: SIGNIFICANT CHANGE UP
BUN SERPL-MCNC: 18 MG/DL — SIGNIFICANT CHANGE UP (ref 7–23)
CALCIUM SERPL-MCNC: 9.9 MG/DL — SIGNIFICANT CHANGE UP (ref 8.5–10.1)
CAST: 2 /LPF — SIGNIFICANT CHANGE UP (ref 0–4)
CHLORIDE SERPL-SCNC: 107 MMOL/L — SIGNIFICANT CHANGE UP (ref 96–108)
CO2 SERPL-SCNC: 25 MMOL/L — SIGNIFICANT CHANGE UP (ref 22–31)
COLOR SPEC: YELLOW — SIGNIFICANT CHANGE UP
CREAT SERPL-MCNC: 1.05 MG/DL — SIGNIFICANT CHANGE UP (ref 0.5–1.3)
DIFF PNL FLD: NEGATIVE — SIGNIFICANT CHANGE UP
EGFR: 51 ML/MIN/1.73M2 — LOW
EOSINOPHIL # BLD AUTO: 0.1 K/UL — SIGNIFICANT CHANGE UP (ref 0–0.5)
EOSINOPHIL NFR BLD AUTO: 0.8 % — SIGNIFICANT CHANGE UP (ref 0–6)
GLUCOSE SERPL-MCNC: 103 MG/DL — HIGH (ref 70–99)
GLUCOSE UR QL: NEGATIVE MG/DL — SIGNIFICANT CHANGE UP
HCT VFR BLD CALC: 41.3 % — SIGNIFICANT CHANGE UP (ref 34.5–45)
HGB BLD-MCNC: 13.6 G/DL — SIGNIFICANT CHANGE UP (ref 11.5–15.5)
IMM GRANULOCYTES NFR BLD AUTO: 0.5 % — SIGNIFICANT CHANGE UP (ref 0–0.9)
INR BLD: 1.5 RATIO — HIGH (ref 0.85–1.18)
KETONES UR-MCNC: NEGATIVE MG/DL — SIGNIFICANT CHANGE UP
LACTATE SERPL-SCNC: 1.6 MMOL/L — SIGNIFICANT CHANGE UP (ref 0.7–2)
LEUKOCYTE ESTERASE UR-ACNC: ABNORMAL
LIDOCAIN IGE QN: 25 U/L — SIGNIFICANT CHANGE UP (ref 13–75)
LYMPHOCYTES # BLD AUTO: 1.86 K/UL — SIGNIFICANT CHANGE UP (ref 1–3.3)
LYMPHOCYTES # BLD AUTO: 14.1 % — SIGNIFICANT CHANGE UP (ref 13–44)
MCHC RBC-ENTMCNC: 31.3 PG — SIGNIFICANT CHANGE UP (ref 27–34)
MCHC RBC-ENTMCNC: 32.9 GM/DL — SIGNIFICANT CHANGE UP (ref 32–36)
MCV RBC AUTO: 95.2 FL — SIGNIFICANT CHANGE UP (ref 80–100)
MONOCYTES # BLD AUTO: 0.83 K/UL — SIGNIFICANT CHANGE UP (ref 0–0.9)
MONOCYTES NFR BLD AUTO: 6.3 % — SIGNIFICANT CHANGE UP (ref 2–14)
NEUTROPHILS # BLD AUTO: 10.25 K/UL — HIGH (ref 1.8–7.4)
NEUTROPHILS NFR BLD AUTO: 77.8 % — HIGH (ref 43–77)
NITRITE UR-MCNC: NEGATIVE — SIGNIFICANT CHANGE UP
PH UR: 5 — SIGNIFICANT CHANGE UP (ref 5–8)
PLATELET # BLD AUTO: 142 K/UL — LOW (ref 150–400)
POTASSIUM SERPL-MCNC: 4.2 MMOL/L — SIGNIFICANT CHANGE UP (ref 3.5–5.3)
POTASSIUM SERPL-SCNC: 4.2 MMOL/L — SIGNIFICANT CHANGE UP (ref 3.5–5.3)
PROT SERPL-MCNC: 7.1 GM/DL — SIGNIFICANT CHANGE UP (ref 6–8.3)
PROT UR-MCNC: NEGATIVE MG/DL — SIGNIFICANT CHANGE UP
PROTHROM AB SERPL-ACNC: 16.8 SEC — HIGH (ref 9.5–13)
RBC # BLD: 4.34 M/UL — SIGNIFICANT CHANGE UP (ref 3.8–5.2)
RBC # FLD: 13.2 % — SIGNIFICANT CHANGE UP (ref 10.3–14.5)
RBC CASTS # UR COMP ASSIST: 0 /HPF — SIGNIFICANT CHANGE UP (ref 0–4)
SODIUM SERPL-SCNC: 137 MMOL/L — SIGNIFICANT CHANGE UP (ref 135–145)
SP GR SPEC: 1.02 — SIGNIFICANT CHANGE UP (ref 1–1.03)
SQUAMOUS # UR AUTO: 2 /HPF — SIGNIFICANT CHANGE UP (ref 0–5)
UROBILINOGEN FLD QL: 0.2 MG/DL — SIGNIFICANT CHANGE UP (ref 0.2–1)
WBC # BLD: 13.17 K/UL — HIGH (ref 3.8–10.5)
WBC # FLD AUTO: 13.17 K/UL — HIGH (ref 3.8–10.5)
WBC UR QL: 3 /HPF — SIGNIFICANT CHANGE UP (ref 0–5)

## 2024-04-22 PROCEDURE — 88341 IMHCHEM/IMCYTCHM EA ADD ANTB: CPT

## 2024-04-22 PROCEDURE — 97530 THERAPEUTIC ACTIVITIES: CPT | Mod: GP

## 2024-04-22 PROCEDURE — 85027 COMPLETE CBC AUTOMATED: CPT

## 2024-04-22 PROCEDURE — 36415 COLL VENOUS BLD VENIPUNCTURE: CPT

## 2024-04-22 PROCEDURE — 85730 THROMBOPLASTIN TIME PARTIAL: CPT

## 2024-04-22 PROCEDURE — 99285 EMERGENCY DEPT VISIT HI MDM: CPT

## 2024-04-22 PROCEDURE — 80048 BASIC METABOLIC PNL TOTAL CA: CPT

## 2024-04-22 PROCEDURE — 93005 ELECTROCARDIOGRAM TRACING: CPT

## 2024-04-22 PROCEDURE — 84484 ASSAY OF TROPONIN QUANT: CPT

## 2024-04-22 PROCEDURE — 83735 ASSAY OF MAGNESIUM: CPT

## 2024-04-22 PROCEDURE — 85025 COMPLETE CBC W/AUTO DIFF WBC: CPT

## 2024-04-22 PROCEDURE — 86923 COMPATIBILITY TEST ELECTRIC: CPT

## 2024-04-22 PROCEDURE — 99222 1ST HOSP IP/OBS MODERATE 55: CPT

## 2024-04-22 PROCEDURE — 72040 X-RAY EXAM NECK SPINE 2-3 VW: CPT

## 2024-04-22 PROCEDURE — 88309 TISSUE EXAM BY PATHOLOGIST: CPT

## 2024-04-22 PROCEDURE — 86850 RBC ANTIBODY SCREEN: CPT

## 2024-04-22 PROCEDURE — 74176 CT ABD & PELVIS W/O CONTRAST: CPT | Mod: 26,MC

## 2024-04-22 PROCEDURE — 86900 BLOOD TYPING SEROLOGIC ABO: CPT

## 2024-04-22 PROCEDURE — 88342 IMHCHEM/IMCYTCHM 1ST ANTB: CPT

## 2024-04-22 PROCEDURE — 80053 COMPREHEN METABOLIC PANEL: CPT

## 2024-04-22 PROCEDURE — 85610 PROTHROMBIN TIME: CPT

## 2024-04-22 PROCEDURE — 97116 GAIT TRAINING THERAPY: CPT | Mod: GP

## 2024-04-22 PROCEDURE — 97162 PT EVAL MOD COMPLEX 30 MIN: CPT | Mod: GP

## 2024-04-22 PROCEDURE — 86901 BLOOD TYPING SEROLOGIC RH(D): CPT

## 2024-04-22 PROCEDURE — C9113: CPT

## 2024-04-22 PROCEDURE — 84100 ASSAY OF PHOSPHORUS: CPT

## 2024-04-22 PROCEDURE — C9399: CPT

## 2024-04-22 PROCEDURE — C1889: CPT

## 2024-04-22 RX ORDER — CHOLECALCIFEROL (VITAMIN D3) 125 MCG
50000 CAPSULE ORAL
Qty: 0 | Refills: 0 | DISCHARGE

## 2024-04-22 RX ORDER — UBIDECARENONE 100 MG
1 CAPSULE ORAL
Qty: 0 | Refills: 0 | DISCHARGE

## 2024-04-22 RX ORDER — MORPHINE SULFATE 50 MG/1
4 CAPSULE, EXTENDED RELEASE ORAL ONCE
Refills: 0 | Status: DISCONTINUED | OUTPATIENT
Start: 2024-04-22 | End: 2024-04-22

## 2024-04-22 RX ORDER — LEVOTHYROXINE SODIUM 125 MCG
1 TABLET ORAL
Refills: 0 | DISCHARGE

## 2024-04-22 RX ORDER — ONDANSETRON 8 MG/1
4 TABLET, FILM COATED ORAL ONCE
Refills: 0 | Status: COMPLETED | OUTPATIENT
Start: 2024-04-22 | End: 2024-04-22

## 2024-04-22 RX ORDER — EZETIMIBE 10 MG/1
1 TABLET ORAL
Refills: 0 | DISCHARGE

## 2024-04-22 RX ORDER — METOPROLOL TARTRATE 50 MG
1 TABLET ORAL
Qty: 0 | Refills: 0 | DISCHARGE

## 2024-04-22 RX ORDER — ABATACEPT 125 MG/ML
125 INJECTION, SOLUTION SUBCUTANEOUS
Refills: 0 | DISCHARGE

## 2024-04-22 RX ORDER — PANTOPRAZOLE SODIUM 20 MG/1
40 TABLET, DELAYED RELEASE ORAL DAILY
Refills: 0 | Status: DISCONTINUED | OUTPATIENT
Start: 2024-04-22 | End: 2024-04-22

## 2024-04-22 RX ORDER — LEVOTHYROXINE SODIUM 125 MCG
1 TABLET ORAL
Qty: 0 | Refills: 0 | DISCHARGE

## 2024-04-22 RX ORDER — LEVOTHYROXINE SODIUM 125 MCG
35 TABLET ORAL AT BEDTIME
Refills: 0 | Status: DISCONTINUED | OUTPATIENT
Start: 2024-04-22 | End: 2024-04-23

## 2024-04-22 RX ORDER — MORPHINE SULFATE 50 MG/1
2 CAPSULE, EXTENDED RELEASE ORAL EVERY 6 HOURS
Refills: 0 | Status: DISCONTINUED | OUTPATIENT
Start: 2024-04-22 | End: 2024-04-23

## 2024-04-22 RX ORDER — ACETAMINOPHEN 500 MG
1000 TABLET ORAL EVERY 6 HOURS
Refills: 0 | Status: DISCONTINUED | OUTPATIENT
Start: 2024-04-22 | End: 2024-04-30

## 2024-04-22 RX ORDER — PANTOPRAZOLE SODIUM 20 MG/1
40 TABLET, DELAYED RELEASE ORAL
Refills: 0 | Status: DISCONTINUED | OUTPATIENT
Start: 2024-04-22 | End: 2024-04-30

## 2024-04-22 RX ORDER — DRONEDARONE 400 MG/1
1 TABLET, FILM COATED ORAL
Refills: 0 | DISCHARGE

## 2024-04-22 RX ORDER — LEVOTHYROXINE SODIUM 125 MCG
15 TABLET ORAL AT BEDTIME
Refills: 0 | Status: DISCONTINUED | OUTPATIENT
Start: 2024-04-22 | End: 2024-04-22

## 2024-04-22 RX ORDER — METOPROLOL TARTRATE 50 MG
20 TABLET ORAL EVERY 12 HOURS
Refills: 0 | Status: DISCONTINUED | OUTPATIENT
Start: 2024-04-22 | End: 2024-04-23

## 2024-04-22 RX ORDER — FOLIC ACID 0.8 MG
1 TABLET ORAL
Qty: 0 | Refills: 0 | DISCHARGE

## 2024-04-22 RX ORDER — ASCORBIC ACID 60 MG
1 TABLET,CHEWABLE ORAL
Refills: 0 | DISCHARGE

## 2024-04-22 RX ORDER — GABAPENTIN 400 MG/1
1 CAPSULE ORAL
Refills: 0 | DISCHARGE

## 2024-04-22 RX ORDER — ACETAMINOPHEN 500 MG
1000 TABLET ORAL EVERY 6 HOURS
Refills: 0 | Status: COMPLETED | OUTPATIENT
Start: 2024-04-22 | End: 2024-04-23

## 2024-04-22 RX ORDER — PANTOPRAZOLE SODIUM 20 MG/1
1 TABLET, DELAYED RELEASE ORAL
Refills: 0 | DISCHARGE

## 2024-04-22 RX ORDER — CHOLECALCIFEROL (VITAMIN D3) 125 MCG
1 CAPSULE ORAL
Refills: 0 | DISCHARGE

## 2024-04-22 RX ORDER — APIXABAN 2.5 MG/1
1 TABLET, FILM COATED ORAL
Refills: 0 | DISCHARGE

## 2024-04-22 RX ORDER — ASPIRIN/CALCIUM CARB/MAGNESIUM 324 MG
1 TABLET ORAL
Qty: 0 | Refills: 0 | DISCHARGE

## 2024-04-22 RX ORDER — ONDANSETRON 8 MG/1
4 TABLET, FILM COATED ORAL EVERY 6 HOURS
Refills: 0 | Status: DISCONTINUED | OUTPATIENT
Start: 2024-04-22 | End: 2024-04-30

## 2024-04-22 RX ORDER — SODIUM CHLORIDE 9 MG/ML
1000 INJECTION INTRAMUSCULAR; INTRAVENOUS; SUBCUTANEOUS
Refills: 0 | Status: DISCONTINUED | OUTPATIENT
Start: 2024-04-22 | End: 2024-04-28

## 2024-04-22 RX ORDER — METOPROLOL TARTRATE 50 MG
1 TABLET ORAL
Refills: 0 | DISCHARGE

## 2024-04-22 RX ORDER — RALOXIFENE HYDROCHLORIDE 60 MG/1
1 TABLET, COATED ORAL
Qty: 0 | Refills: 0 | DISCHARGE

## 2024-04-22 RX ADMIN — ONDANSETRON 4 MILLIGRAM(S): 8 TABLET, FILM COATED ORAL at 17:03

## 2024-04-22 RX ADMIN — MORPHINE SULFATE 4 MILLIGRAM(S): 50 CAPSULE, EXTENDED RELEASE ORAL at 18:10

## 2024-04-22 RX ADMIN — MORPHINE SULFATE 4 MILLIGRAM(S): 50 CAPSULE, EXTENDED RELEASE ORAL at 17:03

## 2024-04-22 RX ADMIN — MORPHINE SULFATE 2 MILLIGRAM(S): 50 CAPSULE, EXTENDED RELEASE ORAL at 21:50

## 2024-04-22 RX ADMIN — ONDANSETRON 4 MILLIGRAM(S): 8 TABLET, FILM COATED ORAL at 21:50

## 2024-04-22 NOTE — ED ADULT NURSE NOTE - OBJECTIVE STATEMENT
pt c/o left abdominal pain over the last couple of weeks and worsening today. pt denies vomiting and diarrhea, endorses nausea. denies cp/sob/fever/chills. family at bedside. 20G placed to left AC. pt is speaking in full and complete sentences without difficulty. pt is poor historian and unable to disclose additional information. pt has pmh of htn and afib and has a loop recorder in place.

## 2024-04-22 NOTE — H&P ADULT - ASSESSMENT
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 Martines  NPO  IVF  hold all AC  resume other  home meds IV conversion  cards consult  hospitalist consult  serial abdominal exams  trend h/h  hold DVT ppx  Protonix    Plan discussed with Dr. Martines

## 2024-04-22 NOTE — H&P ADULT - NSHPLABSRESULTS_GEN_ALL_CORE
Vital Signs Last 24 Hrs  T(C): 36.9 (22 Apr 2024 19:15), Max: 36.9 (22 Apr 2024 19:15)  T(F): 98.4 (22 Apr 2024 19:15), Max: 98.4 (22 Apr 2024 19:15)  HR: 57 (22 Apr 2024 19:15) (57 - 63)  BP: 116/53 (22 Apr 2024 19:15) (116/53 - 138/58)  BP(mean): 65 (22 Apr 2024 19:15) (65 - 65)  RR: 17 (22 Apr 2024 19:15) (17 - 18)  SpO2: 98% (22 Apr 2024 19:15) (96% - 98%)    Parameters below as of 22 Apr 2024 19:15  Patient On (Oxygen Delivery Method): room air      MEDICATIONS  (STANDING):  levothyroxine Injectable 15 MICROGram(s) IV Push at bedtime  metoprolol tartrate Injectable 20 milliGRAM(s) IV Push every 12 hours  pantoprazole  Injectable 40 milliGRAM(s) IV Push daily  sodium chloride 0.9%. 1000 milliLiter(s) (75 mL/Hr) IV Continuous <Continuous>    MEDICATIONS  (PRN):  acetaminophen   IVPB .. 1000 milliGRAM(s) IV Intermittent every 6 hours PRN Mild Pain (1 - 3)  morphine  - Injectable 2 milliGRAM(s) IV Push every 6 hours PRN Severe Pain (7 - 10)  ondansetron Injectable 4 milliGRAM(s) IV Push every 6 hours PRN Nausea                          13.6   13.17 )-----------( 142      ( 22 Apr 2024 17:09 )             41.3     04-22    137  |  107  |  18  ----------------------------<  103<H>  4.2   |  25  |  1.05    Ca    9.9      22 Apr 2024 17:09    TPro  7.1  /  Alb  2.9<L>  /  TBili  0.4  /  DBili  x   /  AST  15  /  ALT  12  /  AlkPhos  64  04-22    I&O's Summary

## 2024-04-22 NOTE — H&P ADULT - HISTORY OF PRESENT ILLNESS
89 y/o female with PMHx of GERD, arthritis, Afib on Eliquis, loop recorder, HTN presents to the ED c/o severe abdominal pain radiating to her back onset this morning. Additionally reports dark stools, however possible in the setting of taking Pepto-Bismol and ?Magnesia. Patient presented to ED a few weeks ago with similar symptoms, was found to have a mass in the stomach, and was discharged with GI follow up. Pt did not make it to GI for follow up. Denies nausea, vomiting, diarrhea, constipation, chest pain, sob. Allergic to sulfa, Penicillin, and IV contrast.

## 2024-04-22 NOTE — ED ADULT NURSE NOTE - CHIEF COMPLAINT QUOTE
pt presents to Ed with complaints of abdominal pain. pt was seen in ED 4/5 and diagnosed with soft tissue abdominal mass. pt endorses pain has not resolved.

## 2024-04-22 NOTE — ED PROVIDER NOTE - OBJECTIVE STATEMENT
88 y/o female with PMHx of GERD, arthritis, Afib, loop recorder, HTN presents to the ED c/o severe abdominal pain radiating circumferentially to her back onset this morning. Additionally reports dark stools, however possible in the setting of taking Pepto-Bismol and ?Magnesia. Patient notes that 1 week ago she underwent a sonogram w/o contrast with findings suspicious for malignant mass. Allergic to sulfa, Penicillin, and IV contrast.

## 2024-04-22 NOTE — ED ADULT NURSE NOTE - NSFALLUNIVINTERV_ED_ALL_ED
Bed/Stretcher in lowest position, wheels locked, appropriate side rails in place/Call bell, personal items and telephone in reach/Instruct patient to call for assistance before getting out of bed/chair/stretcher/Non-slip footwear applied when patient is off stretcher/Sun to call system/Physically safe environment - no spills, clutter or unnecessary equipment/Purposeful proactive rounding/Room/bathroom lighting operational, light cord in reach

## 2024-04-22 NOTE — ED ADULT TRIAGE NOTE - CHIEF COMPLAINT QUOTE
pt presents to Ed with complaints of abdominal pain. pt was seen in ED 4/5 and diagnosed with soft tissue abdominal mass. pt endorses pain has not resolved. no

## 2024-04-22 NOTE — H&P ADULT - NSHPPHYSICALEXAM_GEN_ALL_CORE
Physical Exam:  Pt is AAOx3  General: Well developed, in no acute distress.   Chest: Lungs clear, no rales, no rhonchi, no wheezes.   Heart: RR, no murmurs, no rubs, no gallops.   Abdomen: Soft, diffuse tenderness, mildly distended, no peritonitis, no masses, BS normal.    Back: Normal curvature, no tenderness.   Neuro: Physiological, no localizing findings.   Skin: Normal, no rashes, no lesions noted.   Extremities: Warm, well perfused, no edema, Pulses intact

## 2024-04-22 NOTE — PHARMACOTHERAPY INTERVENTION NOTE - COMMENTS
Medication reconciliation completed.  Reviewed Medication list and confirmed med allergies with patient; confirmed with Dr. First Medguero.

## 2024-04-22 NOTE — ED ADULT NURSE NOTE - NS ED NURSE DISCH DISPOSITION
Pt vomited after the first does of dexamethasone. MD notified. Another dose of dexamethasone given. Pt tolerated   
Admitted

## 2024-04-22 NOTE — ED PROVIDER NOTE - CLINICAL SUMMARY MEDICAL DECISION MAKING FREE TEXT BOX
Labs, CT scan, pain control. Labs, CT scan, pain control.  Ct showing increasing size of mass and now hemorrhage. Surgery consulted. Admitted to Dr. Martines in stable condition.

## 2024-04-23 LAB
ANION GAP SERPL CALC-SCNC: 4 MMOL/L — LOW (ref 5–17)
BUN SERPL-MCNC: 20 MG/DL — SIGNIFICANT CHANGE UP (ref 7–23)
CALCIUM SERPL-MCNC: 9.2 MG/DL — SIGNIFICANT CHANGE UP (ref 8.5–10.1)
CHLORIDE SERPL-SCNC: 110 MMOL/L — HIGH (ref 96–108)
CO2 SERPL-SCNC: 26 MMOL/L — SIGNIFICANT CHANGE UP (ref 22–31)
CREAT SERPL-MCNC: 1.46 MG/DL — HIGH (ref 0.5–1.3)
CULTURE RESULTS: SIGNIFICANT CHANGE UP
EGFR: 34 ML/MIN/1.73M2 — LOW
GLUCOSE SERPL-MCNC: 100 MG/DL — HIGH (ref 70–99)
HCT VFR BLD CALC: 32.4 % — LOW (ref 34.5–45)
HCT VFR BLD CALC: 34.8 % — SIGNIFICANT CHANGE UP (ref 34.5–45)
HGB BLD-MCNC: 10.3 G/DL — LOW (ref 11.5–15.5)
HGB BLD-MCNC: 11.3 G/DL — LOW (ref 11.5–15.5)
MAGNESIUM SERPL-MCNC: 2 MG/DL — SIGNIFICANT CHANGE UP (ref 1.6–2.6)
MCHC RBC-ENTMCNC: 31.1 PG — SIGNIFICANT CHANGE UP (ref 27–34)
MCHC RBC-ENTMCNC: 31.4 PG — SIGNIFICANT CHANGE UP (ref 27–34)
MCHC RBC-ENTMCNC: 31.8 GM/DL — LOW (ref 32–36)
MCHC RBC-ENTMCNC: 32.5 GM/DL — SIGNIFICANT CHANGE UP (ref 32–36)
MCV RBC AUTO: 96.7 FL — SIGNIFICANT CHANGE UP (ref 80–100)
MCV RBC AUTO: 97.9 FL — SIGNIFICANT CHANGE UP (ref 80–100)
PHOSPHATE SERPL-MCNC: 3.6 MG/DL — SIGNIFICANT CHANGE UP (ref 2.5–4.5)
PLATELET # BLD AUTO: 111 K/UL — LOW (ref 150–400)
PLATELET # BLD AUTO: 115 K/UL — LOW (ref 150–400)
POTASSIUM SERPL-MCNC: 4.5 MMOL/L — SIGNIFICANT CHANGE UP (ref 3.5–5.3)
POTASSIUM SERPL-SCNC: 4.5 MMOL/L — SIGNIFICANT CHANGE UP (ref 3.5–5.3)
RBC # BLD: 3.31 M/UL — LOW (ref 3.8–5.2)
RBC # BLD: 3.6 M/UL — LOW (ref 3.8–5.2)
RBC # FLD: 13.3 % — SIGNIFICANT CHANGE UP (ref 10.3–14.5)
RBC # FLD: 13.4 % — SIGNIFICANT CHANGE UP (ref 10.3–14.5)
SODIUM SERPL-SCNC: 140 MMOL/L — SIGNIFICANT CHANGE UP (ref 135–145)
SPECIMEN SOURCE: SIGNIFICANT CHANGE UP
WBC # BLD: 10.34 K/UL — SIGNIFICANT CHANGE UP (ref 3.8–10.5)
WBC # BLD: 11.76 K/UL — HIGH (ref 3.8–10.5)
WBC # FLD AUTO: 10.34 K/UL — SIGNIFICANT CHANGE UP (ref 3.8–10.5)
WBC # FLD AUTO: 11.76 K/UL — HIGH (ref 3.8–10.5)

## 2024-04-23 PROCEDURE — 99221 1ST HOSP IP/OBS SF/LOW 40: CPT

## 2024-04-23 PROCEDURE — 93010 ELECTROCARDIOGRAM REPORT: CPT

## 2024-04-23 RX ORDER — METOPROLOL TARTRATE 50 MG
5 TABLET ORAL EVERY 6 HOURS
Refills: 0 | Status: DISCONTINUED | OUTPATIENT
Start: 2024-04-23 | End: 2024-04-24

## 2024-04-23 RX ORDER — ACETAMINOPHEN 500 MG
1000 TABLET ORAL EVERY 6 HOURS
Refills: 0 | Status: COMPLETED | OUTPATIENT
Start: 2024-04-23 | End: 2024-04-23

## 2024-04-23 RX ORDER — METOPROLOL TARTRATE 50 MG
5 TABLET ORAL EVERY 6 HOURS
Refills: 0 | Status: DISCONTINUED | OUTPATIENT
Start: 2024-04-23 | End: 2024-04-23

## 2024-04-23 RX ORDER — MORPHINE SULFATE 50 MG/1
2 CAPSULE, EXTENDED RELEASE ORAL EVERY 4 HOURS
Refills: 0 | Status: DISCONTINUED | OUTPATIENT
Start: 2024-04-23 | End: 2024-04-25

## 2024-04-23 RX ORDER — HYDROCORTISONE 20 MG
50 TABLET ORAL ONCE
Refills: 0 | Status: COMPLETED | OUTPATIENT
Start: 2024-04-25 | End: 2024-04-25

## 2024-04-23 RX ORDER — LEVOTHYROXINE SODIUM 125 MCG
44 TABLET ORAL AT BEDTIME
Refills: 0 | Status: DISCONTINUED | OUTPATIENT
Start: 2024-04-23 | End: 2024-04-24

## 2024-04-23 RX ADMIN — Medication 1000 MILLIGRAM(S): at 01:11

## 2024-04-23 RX ADMIN — Medication 1000 MILLIGRAM(S): at 10:07

## 2024-04-23 RX ADMIN — MORPHINE SULFATE 2 MILLIGRAM(S): 50 CAPSULE, EXTENDED RELEASE ORAL at 20:13

## 2024-04-23 RX ADMIN — Medication 5 MILLIGRAM(S): at 18:04

## 2024-04-23 RX ADMIN — PANTOPRAZOLE SODIUM 40 MILLIGRAM(S): 20 TABLET, DELAYED RELEASE ORAL at 21:41

## 2024-04-23 RX ADMIN — SODIUM CHLORIDE 75 MILLILITER(S): 9 INJECTION INTRAMUSCULAR; INTRAVENOUS; SUBCUTANEOUS at 14:41

## 2024-04-23 RX ADMIN — MORPHINE SULFATE 2 MILLIGRAM(S): 50 CAPSULE, EXTENDED RELEASE ORAL at 20:28

## 2024-04-23 RX ADMIN — MORPHINE SULFATE 2 MILLIGRAM(S): 50 CAPSULE, EXTENDED RELEASE ORAL at 15:09

## 2024-04-23 RX ADMIN — Medication 400 MILLIGRAM(S): at 00:41

## 2024-04-23 RX ADMIN — Medication 400 MILLIGRAM(S): at 18:04

## 2024-04-23 RX ADMIN — MORPHINE SULFATE 2 MILLIGRAM(S): 50 CAPSULE, EXTENDED RELEASE ORAL at 06:28

## 2024-04-23 RX ADMIN — MORPHINE SULFATE 2 MILLIGRAM(S): 50 CAPSULE, EXTENDED RELEASE ORAL at 05:58

## 2024-04-23 RX ADMIN — Medication 44 MICROGRAM(S): at 21:41

## 2024-04-23 RX ADMIN — SODIUM CHLORIDE 75 MILLILITER(S): 9 INJECTION INTRAMUSCULAR; INTRAVENOUS; SUBCUTANEOUS at 20:13

## 2024-04-23 RX ADMIN — Medication 400 MILLIGRAM(S): at 09:52

## 2024-04-23 RX ADMIN — Medication 1000 MILLIGRAM(S): at 18:19

## 2024-04-23 RX ADMIN — MORPHINE SULFATE 2 MILLIGRAM(S): 50 CAPSULE, EXTENDED RELEASE ORAL at 14:54

## 2024-04-23 RX ADMIN — PANTOPRAZOLE SODIUM 40 MILLIGRAM(S): 20 TABLET, DELAYED RELEASE ORAL at 09:53

## 2024-04-23 NOTE — CONSULT NOTE ADULT - NS ATTEND AMEND GEN_ALL_CORE FT
Patient seen and examined with YENY Barreto.  I was physically present for the key portions of the evaluation and management (E/M) service provided.  I agree with the above history, physical, and plan which I have reviewed and edited where appropriate.     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 resume post procedure  on Lopressor IV here with parameters    # HLD   hold statin for now    # hypothyroidism  cont synthroid IV    #Afib, CV# 4  hold eliquis for possible surgery. resume once okay with surgery   cont bb  hold multaq for now    # RA  on orencia once per month    # vte prophylaxis  hold eliquis for now d/t possible surgery  venodynes for now

## 2024-04-23 NOTE — PROGRESS NOTE ADULT - ASSESSMENT
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 Martines

## 2024-04-23 NOTE — CONSULT NOTE ADULT - SUBJECTIVE AND OBJECTIVE BOX
PCP:  Dr ludmila James    CHIEF COMPLAINT: Luq pain    HISTORY OF THE PRESENT ILLNESS: this is an 89 yo female with pmh: HTN, HLD, hypothyroidism, afib on eliquis ( last dose yesterday am), has loop recorder, RA on    PAST MEDICAL HISTORY:    PAST SURGICAL HISTORY:    FAMILY HISTORY:   non-contributory to the patient's current presentation    SOCIAL HISTORY:  no smoking, no alcohol, no drugs    ALLERGIES:    HOME MEDS:      Vital Signs Last 24 Hrs  T(C): 37 (23 Apr 2024 07:48), Max: 37 (23 Apr 2024 07:48)  T(F): 98.6 (23 Apr 2024 07:48), Max: 98.6 (23 Apr 2024 07:48)  HR: 58 (23 Apr 2024 07:48) (57 - 71)  BP: 93/43 (23 Apr 2024 07:48) (93/43 - 138/58)  BP(mean): 65 (22 Apr 2024 19:15) (65 - 65)  RR: 16 (23 Apr 2024 07:48) (16 - 18)  SpO2: 97% (23 Apr 2024 07:48) (93% - 98%)    Parameters below as of 23 Apr 2024 07:48  Patient On (Oxygen Delivery Method): nasal cannula  O2 Flow (L/min): 2        REVIEW OF SYSTEMS:   All 10 systems reviewed in detailed and found to be negative with the exception of what has already been described above    MEDICATIONS  (STANDING):  levothyroxine Injectable 35 MICROGram(s) IV Push at bedtime  metoprolol tartrate Injectable 20 milliGRAM(s) IV Push every 12 hours  pantoprazole  Injectable 40 milliGRAM(s) IV Push two times a day  sodium chloride 0.9%. 1000 milliLiter(s) (75 mL/Hr) IV Continuous <Continuous>    MEDICATIONS  (PRN):  acetaminophen   IVPB .. 1000 milliGRAM(s) IV Intermittent every 6 hours PRN Mild Pain (1 - 3)  morphine  - Injectable 2 milliGRAM(s) IV Push every 4 hours PRN Severe Pain (7 - 10)  ondansetron Injectable 4 milliGRAM(s) IV Push every 6 hours PRN Nausea      HEENT:  pupils equal and reactive, EOMI, no oropharyngeal lesions, erythema, exudates, oral thrush  NECK:   supple, no carotid bruits  CV:  +S1, +S2, regular, no murmurs  RESP:   lungs clear to auscultation bilaterally, no wheezing, rales, rhonchi, good air entry bilaterally  GI:  abdomen soft, non-tender, non-distended, normal BS  EXT:  no clubbing, no cyanosis, no edema, no calf pain, swelling or erythema  NEURO:  AAOX3, no focal neurological deficits, follows all commands, able to move extremities spontaneously  SKIN:  no ulcers, lesions or rashes    LABS:                          11.3   10.34 )-----------( 115      ( 23 Apr 2024 08:11 )             34.8     04-23    140  |  110<H>  |  20  ----------------------------<  100<H>  4.5   |  26  |  1.46<H>    Ca    9.2      23 Apr 2024 08:11  Phos  3.6     04-23  Mg     2.0     04-23    TPro  7.1  /  Alb  2.9<L>  /  TBili  0.4  /  DBili  x   /  AST  15  /  ALT  12  /  AlkPhos  64  04-22        LIVER FUNCTIONS - ( 22 Apr 2024 17:09 )  Alb: 2.9 g/dL / Pro: 7.1 gm/dL / ALK PHOS: 64 U/L / ALT: 12 U/L / AST: 15 U/L / GGT: x           PT/INR - ( 22 Apr 2024 17:09 )   PT: 16.8 sec;   INR: 1.50 ratio         PTT - ( 22 Apr 2024 17:09 )  PTT:30.4 sec  Urinalysis Basic - ( 23 Apr 2024 08:11 )    Color: x / Appearance: x / SG: x / pH: x  Gluc: 100 mg/dL / Ketone: x  / Bili: x / Urobili: x   Blood: x / Protein: x / Nitrite: x   Leuk Esterase: x / RBC: x / WBC x   Sq Epi: x / Non Sq Epi: x / Bacteria: x        Lactate, Blood: 1.6 mmol/L (04-22 @ 17:09)    Blood, Urine: Negative (04-22 @ 19:53)    Troponin Trend: Lactate, Blood: 1.6 mmol/L (04-22 @ 17:09)    Lactate, Blood: 1.6 mmol/L (04-22 @ 17:09)                          EKG:     RADIOLOGY STUDIES:      IMPRESSION:           PCP:  Dr ludmila James    CHIEF COMPLAINT: Luq pain    HISTORY OF THE PRESENT ILLNESS: this is an 88 yo female with pmh: HTN, HLD, hypothyroidism, afib on eliquis ( last dose yesterday am), has loop recorder, RA on Orencia, OP, who came to the ER with abd pain on 4/5, ct scan at that time reported gastric mass, was discharged instructed to f/u with GI doc, Dr Vazquez, however per pt GI doc [place] and wa sunable to get an appt until May 5th.  yesterday the pain in her stomach was severe, and she return to the ER.  Cat scan of her abd    PAST MEDICAL HISTORY:    PAST SURGICAL HISTORY:    FAMILY HISTORY:   non-contributory to the patient's current presentation    SOCIAL HISTORY:  no smoking, no alcohol, no drugs    ALLERGIES:    HOME MEDS:      Vital Signs Last 24 Hrs  T(C): 37 (23 Apr 2024 07:48), Max: 37 (23 Apr 2024 07:48)  T(F): 98.6 (23 Apr 2024 07:48), Max: 98.6 (23 Apr 2024 07:48)  HR: 58 (23 Apr 2024 07:48) (57 - 71)  BP: 93/43 (23 Apr 2024 07:48) (93/43 - 138/58)  BP(mean): 65 (22 Apr 2024 19:15) (65 - 65)  RR: 16 (23 Apr 2024 07:48) (16 - 18)  SpO2: 97% (23 Apr 2024 07:48) (93% - 98%)    Parameters below as of 23 Apr 2024 07:48  Patient On (Oxygen Delivery Method): nasal cannula  O2 Flow (L/min): 2        REVIEW OF SYSTEMS:   All 10 systems reviewed in detailed and found to be negative with the exception of what has already been described above    MEDICATIONS  (STANDING):  levothyroxine Injectable 35 MICROGram(s) IV Push at bedtime  metoprolol tartrate Injectable 20 milliGRAM(s) IV Push every 12 hours  pantoprazole  Injectable 40 milliGRAM(s) IV Push two times a day  sodium chloride 0.9%. 1000 milliLiter(s) (75 mL/Hr) IV Continuous <Continuous>    MEDICATIONS  (PRN):  acetaminophen   IVPB .. 1000 milliGRAM(s) IV Intermittent every 6 hours PRN Mild Pain (1 - 3)  morphine  - Injectable 2 milliGRAM(s) IV Push every 4 hours PRN Severe Pain (7 - 10)  ondansetron Injectable 4 milliGRAM(s) IV Push every 6 hours PRN Nausea      HEENT:  pupils equal and reactive, EOMI, no oropharyngeal lesions, erythema, exudates, oral thrush  NECK:   supple, no carotid bruits  CV:  +S1, +S2, regular, no murmurs  RESP:   lungs clear to auscultation bilaterally, no wheezing, rales, rhonchi, good air entry bilaterally  GI:  abdomen soft, non-tender, non-distended, normal BS  EXT:  no clubbing, no cyanosis, no edema, no calf pain, swelling or erythema  NEURO:  AAOX3, no focal neurological deficits, follows all commands, able to move extremities spontaneously  SKIN:  no ulcers, lesions or rashes    LABS:                          11.3   10.34 )-----------( 115      ( 23 Apr 2024 08:11 )             34.8     04-23    140  |  110<H>  |  20  ----------------------------<  100<H>  4.5   |  26  |  1.46<H>    Ca    9.2      23 Apr 2024 08:11  Phos  3.6     04-23  Mg     2.0     04-23    TPro  7.1  /  Alb  2.9<L>  /  TBili  0.4  /  DBili  x   /  AST  15  /  ALT  12  /  AlkPhos  64  04-22        LIVER FUNCTIONS - ( 22 Apr 2024 17:09 )  Alb: 2.9 g/dL / Pro: 7.1 gm/dL / ALK PHOS: 64 U/L / ALT: 12 U/L / AST: 15 U/L / GGT: x           PT/INR - ( 22 Apr 2024 17:09 )   PT: 16.8 sec;   INR: 1.50 ratio         PTT - ( 22 Apr 2024 17:09 )  PTT:30.4 sec  Urinalysis Basic - ( 23 Apr 2024 08:11 )    Color: x / Appearance: x / SG: x / pH: x  Gluc: 100 mg/dL / Ketone: x  / Bili: x / Urobili: x   Blood: x / Protein: x / Nitrite: x   Leuk Esterase: x / RBC: x / WBC x   Sq Epi: x / Non Sq Epi: x / Bacteria: x        Lactate, Blood: 1.6 mmol/L (04-22 @ 17:09)    Blood, Urine: Negative (04-22 @ 19:53)    Troponin Trend: Lactate, Blood: 1.6 mmol/L (04-22 @ 17:09)    Lactate, Blood: 1.6 mmol/L (04-22 @ 17:09)                          EKG:     RADIOLOGY STUDIES:      IMPRESSION:           PCP:  Dr ludmila James    CHIEF COMPLAINT: Luq pain    HISTORY OF THE PRESENT ILLNESS: this is an 88 yo female with pmh: DDD, HTN, HLD, hypothyroidism, afib on eliquis ( last dose yesterday am), has loop recorder, RA on Orencia, OP, who came to the ER with abd pain on 4/5, ct scan at that time reported gastric mass, was discharged instructed to f/u with GI doc, Dr Vazquez, however per pt GI doc [place] in [place] office one day a week and was unable to get an appt until May 5th.  Yesterday the pain in her stomach was severe, and she return to the ER.  Cat scan of her abd reports Increase in size of lobulated mass contiguous with greater curvature of stomach since 4/5/2024 with newly evident internal hyperdensity compatible with hemorrhage, Newly evident hyperdense left lateral perisplenic fluid compatible with hemorrhage, Newly evident hyperdense fluid in posterior pelvis compatible with hemorrhage.     Pt is seen at bedside, with sons present, awake, alert, c/o LUQ pain which pt describes as "20 out of 10" at its worse pain, but having rec'd Morphine recently she states her pain is at "2." Pt is npo for upper endo this evening.  Pt denies any problems with anesthesia in the past,  used to run 8 miler per day up until age 66, but has been unable to since 3/ bad arthritis and chronic back pain,  She ambs independently, states she can walk around the block, go grocery shopping without any cp or sob.  Denies any f/c/r, denies any burning or freq on urination, denies any BRBPR or any black stools, no n/v, but states her appetite has decreased significantly      PAST MEDICAL HISTORY: as above    PAST SURGICAL HISTORY: loop recorder implant, APPY, Tonsillectomy, last colonoscopy age 66, right rotator cuff repair, cerebral stent in 2004 ( due to severe headaches??)    FAMILY HISTORY:  mother dec: age 90's natural causes, Father: dec : age 77, h/o RA    SOCIAL HISTORY: former smoker: 1/2-1pk per day, quit 25 years ago, rare ETOH, denies rec drug use    ALLERGIES: contrasr media, PCN, sulf drugs    HOME MEDS: see med rec      Vital Signs Last 24 Hrs  T(C): 37 (23 Apr 2024 07:48), Max: 37 (23 Apr 2024 07:48)  T(F): 98.6 (23 Apr 2024 07:48), Max: 98.6 (23 Apr 2024 07:48)  HR: 58 (23 Apr 2024 07:48) (57 - 71)  BP: 93/43 (23 Apr 2024 07:48) (93/43 - 138/58)  BP(mean): 65 (22 Apr 2024 19:15) (65 - 65)  RR: 16 (23 Apr 2024 07:48) (16 - 18)  SpO2: 97% (23 Apr 2024 07:48) (93% - 98%)    Parameters below as of 23 Apr 2024 07:48  Patient On (Oxygen Delivery Method): nasal cannula  O2 Flow (L/min): 2      REVIEW OF SYSTEMS:   All 10 systems reviewed in detailed and found to be negative with the exception of what has already been described above    MEDICATIONS  (STANDING):  levothyroxine Injectable 35 MICROGram(s) IV Push at bedtime  metoprolol tartrate Injectable 20 milliGRAM(s) IV Push every 12 hours  pantoprazole  Injectable 40 milliGRAM(s) IV Push two times a day  sodium chloride 0.9%. 1000 milliLiter(s) (75 mL/Hr) IV Continuous <Continuous>    MEDICATIONS  (PRN):  acetaminophen   IVPB .. 1000 milliGRAM(s) IV Intermittent every 6 hours PRN Mild Pain (1 - 3)  morphine  - Injectable 2 milliGRAM(s) IV Push every 4 hours PRN Severe Pain (7 - 10)  ondansetron Injectable 4 milliGRAM(s) IV Push every 6 hours PRN Nausea    PHYSICAL EXAM:    GENERAL: Comfortable, no acute distress   HEAD:  Normocephalic, atraumatic  EYES: EOMI, PERRLA  HEENT: Moist mucous membranes  NECK: Supple, No JVD  NERVOUS SYSTEM:  Alert & Oriented X3, Motor Strength 5/5 B/L upper and lower extremities  CHEST/LUNG: Clear to auscultation bilaterally  HEART: S1 S2 RRR, on tele monitor NSR  ABDOMEN: Soft, + LUQ tenderness Nondistended, Bowel sounds present, no N/V, presently NPO  GENITOURINARY: Voiding, no palpable bladder  EXTREMITIES:   No clubbing, cyanosis, or edema  MUSCULOSKELETAL- No muscle tenderness, no joint tenderness  SKIN-no rash    LABS:                      11.3   10.34 )-----------( 115      ( 23 Apr 2024 08:11 )             34.8     04-23    140  |  110<H>  |  20  ----------------------------<  100<H>  4.5   |  26  |  1.46<H>    Ca    9.2      23 Apr 2024 08:11  Phos  3.6     04-23  Mg     2.0     04-23    TPro  7.1  /  Alb  2.9<L>  /  TBili  0.4  /  DBili  x   /  AST  15  /  ALT  12  /  AlkPhos  64  04-22    LIVER FUNCTIONS - ( 22 Apr 2024 17:09 )  Alb: 2.9 g/dL / Pro: 7.1 gm/dL / ALK PHOS: 64 U/L / ALT: 12 U/L / AST: 15 U/L / GGT: x           PT/INR - ( 22 Apr 2024 17:09 )   PT: 16.8 sec;   INR: 1.50 ratio     PTT - ( 22 Apr 2024 17:09 )  PTT:30.4 sec      Urinalysis Basic - ( 23 Apr 2024 08:11 )    Color: x / Appearance: x / SG: x / pH: x  Gluc: 100 mg/dL / Ketone: x  / Bili: x / Urobili: x   Blood: x / Protein: x / Nitrite: x   Leuk Esterase: x / RBC: x / WBC x   Sq Epi: x / Non Sq Epi: x / Bacteria: x        Lactate, Blood: 1.6 mmol/L (04-22 @ 17:09)    Blood, Urine: Negative (04-22 @ 19:53)    EKG NSR:     RADIOLOGY STUDIES:    < from: CT Abdomen and Pelvis No Cont (04.22.24 @ 17:52) >    ACC: [id number] EXAM:  CT ABDOMEN AND PELVIS   ORDERED BY: DAVID WU     PROCEDURE DATE:  04/22/2024          INTERPRETATION:  CLINICAL INFORMATION: Left-sided abdominal pain, mass on   recent CT    COMPARISON: CT abdomen/pelvis 4/5/2024    CONTRAST/COMPLICATIONS:  IV Contrast: NONE  Oral Contrast: NONE  Complications: None reported at time of study completion    PROCEDURE:  CT of the Abdomen and Pelvis was performed.  Sagittal and coronal reformats were performed.    FINDINGS:  LOWER CHEST: Left basilar atelectatic change    LIVER: Normal in size. Subcentimeter hypodensities too small to   characterize but most likely cysts.  BILE DUCTS: Normal in caliber  GALLBLADDER: No intraluminal calcification or pericholecystic fluid.  SPLEEN: Normal in size. Newly evident lateral hyperdense left perisplenic   fluid compatible with hemorrhage.  PANCREAS: Grossly unremarkable, unenhanced morphology.  ADRENALS: No mass.  KIDNEYS/URETERS: No renal or ureteral calcification or hydronephrosis.   Bilateral renal cysts.    BLADDER: Grossly unremarkable.  REPRODUCTIVE ORGANS: Atrophic uterus.    BOWEL: No acute pathology or bowel obstruction. Increase in the size of   lobulated mass contiguous with greater curvature of stomach with newly   evident internal hyperdensity compatible with hemorrhage.  PERITONEUM: Newly evident hyperdense fluid in posterior pelvis compatible   with hemorrhage. No free air.  VESSELS: Tortuosity and atherosclerotic calcification.  RETROPERITONEUM/LYMPH NODES: No significant lymphadenopathy.  ABDOMINAL WALL: No abnormal mass or fluid collection  BONES: Unchanged in appearance.    IMPRESSION:  Increase in size of lobulated mass contiguous with greater curvature of   stomach since 4/5/2024 with newly evident internal hyperdensity   compatible with hemorrhage.    Newly evident hyperdense left lateral perisplenic fluid compatible with   hemorrhage.    Newly evident hyperdense fluid in posterior pelvis compatible with   hemorrhage.              IMPRESSION:           PCP:  Dr ludmila James    CHIEF COMPLAINT: Luq pain    HISTORY OF THE PRESENT ILLNESS: this is an 88 yo female with pmh: DDD, HTN, HLD, hypothyroidism, afib on eliquis ( last dose yesterday am), has loop recorder, RA on Orencia, OP, who came to the ER with abd pain on 4/5, ct scan at that time reported gastric mass, was discharged instructed to f/u with GI doc, Dr Vazquez, however per pt GI doc [place] one day a week and was unable to get an appt until May 5th.  Yesterday the pain in her stomach was severe, and she return to the ER.  Cat scan of her abd reports Increase in size of lobulated mass contiguous with greater curvature of stomach since 4/5/2024 with newly evident internal hyperdensity compatible with hemorrhage, Newly evident hyperdense left lateral perisplenic fluid compatible with hemorrhage, Newly evident hyperdense fluid in posterior pelvis compatible with hemorrhage.     Pt is seen at bedside, with sons present, awake, alert, c/o LUQ pain which pt describes as "20 out of 10" at its worse pain, but having rec'd Morphine recently she states her pain is at "2." Pt is npo for upper endo this evening.  Pt denies any problems with anesthesia in the past,  used to run 8 miler per day up until age 66, but has been unable to since 3/ bad arthritis and chronic back pain,  She ambs independently, states she can walk around the block, go grocery shopping without any cp or sob.  Denies any f/c/r, denies any burning or freq on urination, denies any BRBPR or any black stools, no n/v, but states her appetite has decreased significantly      PAST MEDICAL HISTORY: as above    PAST SURGICAL HISTORY: loop recorder implant, APPY, Tonsillectomy, last colonoscopy age 66, right rotator cuff repair, cerebral stent in 2004 ( due to severe headaches??)    FAMILY HISTORY:  mother dec: age 90's natural causes, Father: dec : age 77, h/o RA    SOCIAL HISTORY: former smoker: 1/2-1pk per day, quit 25 years ago, rare ETOH, denies rec drug use    ALLERGIES: contrasr media, PCN, sulf drugs    HOME MEDS: see med rec      Vital Signs Last 24 Hrs  T(C): 37 (23 Apr 2024 07:48), Max: 37 (23 Apr 2024 07:48)  T(F): 98.6 (23 Apr 2024 07:48), Max: 98.6 (23 Apr 2024 07:48)  HR: 58 (23 Apr 2024 07:48) (57 - 71)  BP: 93/43 (23 Apr 2024 07:48) (93/43 - 138/58)  BP(mean): 65 (22 Apr 2024 19:15) (65 - 65)  RR: 16 (23 Apr 2024 07:48) (16 - 18)  SpO2: 97% (23 Apr 2024 07:48) (93% - 98%)    Parameters below as of 23 Apr 2024 07:48  Patient On (Oxygen Delivery Method): nasal cannula  O2 Flow (L/min): 2      REVIEW OF SYSTEMS:   All 10 systems reviewed in detailed and found to be negative with the exception of what has already been described above    MEDICATIONS  (STANDING):  levothyroxine Injectable 35 MICROGram(s) IV Push at bedtime  metoprolol tartrate Injectable 20 milliGRAM(s) IV Push every 12 hours  pantoprazole  Injectable 40 milliGRAM(s) IV Push two times a day  sodium chloride 0.9%. 1000 milliLiter(s) (75 mL/Hr) IV Continuous <Continuous>    MEDICATIONS  (PRN):  acetaminophen   IVPB .. 1000 milliGRAM(s) IV Intermittent every 6 hours PRN Mild Pain (1 - 3)  morphine  - Injectable 2 milliGRAM(s) IV Push every 4 hours PRN Severe Pain (7 - 10)  ondansetron Injectable 4 milliGRAM(s) IV Push every 6 hours PRN Nausea    PHYSICAL EXAM:    GENERAL: Comfortable, no acute distress   HEAD:  Normocephalic, atraumatic  EYES: EOMI, PERRLA  HEENT: Moist mucous membranes  NECK: Supple, No JVD  NERVOUS SYSTEM:  Alert & Oriented X3, Motor Strength 5/5 B/L upper and lower extremities  CHEST/LUNG: Clear to auscultation bilaterally  HEART: S1 S2 RRR, on tele monitor NSR  ABDOMEN: Soft, + LUQ tenderness Nondistended, Bowel sounds present, no N/V, presently NPO  GENITOURINARY: Voiding, no palpable bladder  EXTREMITIES:   No clubbing, cyanosis, or edema  MUSCULOSKELETAL- No muscle tenderness, no joint tenderness  SKIN-no rash    LABS:                      11.3   10.34 )-----------( 115      ( 23 Apr 2024 08:11 )             34.8     04-23    140  |  110<H>  |  20  ----------------------------<  100<H>  4.5   |  26  |  1.46<H>    Ca    9.2      23 Apr 2024 08:11  Phos  3.6     04-23  Mg     2.0     04-23    TPro  7.1  /  Alb  2.9<L>  /  TBili  0.4  /  DBili  x   /  AST  15  /  ALT  12  /  AlkPhos  64  04-22    LIVER FUNCTIONS - ( 22 Apr 2024 17:09 )  Alb: 2.9 g/dL / Pro: 7.1 gm/dL / ALK PHOS: 64 U/L / ALT: 12 U/L / AST: 15 U/L / GGT: x           PT/INR - ( 22 Apr 2024 17:09 )   PT: 16.8 sec;   INR: 1.50 ratio     PTT - ( 22 Apr 2024 17:09 )  PTT:30.4 sec      Urinalysis Basic - ( 23 Apr 2024 08:11 )    Color: x / Appearance: x / SG: x / pH: x  Gluc: 100 mg/dL / Ketone: x  / Bili: x / Urobili: x   Blood: x / Protein: x / Nitrite: x   Leuk Esterase: x / RBC: x / WBC x   Sq Epi: x / Non Sq Epi: x / Bacteria: x        Lactate, Blood: 1.6 mmol/L (04-22 @ 17:09)    Blood, Urine: Negative (04-22 @ 19:53)    EKG NSR:     RADIOLOGY STUDIES:    < from: CT Abdomen and Pelvis No Cont (04.22.24 @ 17:52) >    ACC: [id number] EXAM:  CT ABDOMEN AND PELVIS   ORDERED BY: DAVID WU     PROCEDURE DATE:  04/22/2024        INTERPRETATION:  CLINICAL INFORMATION: Left-sided abdominal pain, mass on   recent CT    COMPARISON: CT abdomen/pelvis 4/5/2024    CONTRAST/COMPLICATIONS:  IV Contrast: NONE  Oral Contrast: NONE  Complications: None reported at time of study completion    PROCEDURE:  CT of the Abdomen and Pelvis was performed.  Sagittal and coronal reformats were performed.    FINDINGS:  LOWER CHEST: Left basilar atelectatic change    LIVER: Normal in size. Subcentimeter hypo-densities too small to   characterize but most likely cysts.  BILE DUCTS: Normal in caliber  GALLBLADDER: No intraluminal calcification or pericholecystic fluid.  SPLEEN: Normal in size. Newly evident lateral hyperdense left perisplenic   fluid compatible with hemorrhage.  PANCREAS: Grossly unremarkable, unenhanced morphology.  ADRENALS: No mass.  KIDNEYS/URETERS: No renal or ureteral calcification or hydronephrosis.   Bilateral renal cysts.    BLADDER: Grossly unremarkable.  REPRODUCTIVE ORGANS: Atrophic uterus.    BOWEL: No acute pathology or bowel obstruction. Increase in the size of   lobulated mass contiguous with greater curvature of stomach with newly   evident internal hyperdensity compatible with hemorrhage.  PERITONEUM: Newly evident hyperdense fluid in posterior pelvis compatible   with hemorrhage. No free air.  VESSELS: Tortuosity and atherosclerotic calcification.  RETROPERITONEUM/LYMPH NODES: No significant lymphadenopathy.  ABDOMINAL WALL: No abnormal mass or fluid collection  BONES: Unchanged in appearance.    IMPRESSION:  Increase in size of lobulated mass contiguous with greater curvature of   stomach since 4/5/2024 with newly evident internal hyperdensity   compatible with hemorrhage.    Newly evident hyperdense left lateral perisplenic fluid compatible with   hemorrhage.    Newly evident hyperdense fluid in posterior pelvis compatible with   hemorrhage.        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  npo for Upper Endo today  IVFs while NPO  Pain control with Morphine  EKG to be ordered  labs reviewed  cardiac consult pending    # HTN, borderline hypotensive now     takes toprol at home  on Lopressor IV here with parameters    # HLD   hold statin for now    # hypothyroidism  cont synthroid IV    #Afib, CV# 4  hold eliquis for possible surgery  cont bb  hold multaq for now    # RA  on orencia once per month    # vte prophylaxis  hold eliquis for now d/t possible surgery  venodynes for now    dispo: endo today, Poss surgery tomorrow         PCP:  Dr ludmila James    CHIEF COMPLAINT: Luq pain    HISTORY OF THE PRESENT ILLNESS: this is an 88 yo female with pmh: DDD, HTN, HLD, hypothyroidism, afib on eliquis ( last dose yesterday am), has loop recorder, RA on Orencia, OP, who came to the ER with abd pain on 4/5, ct scan at that time reported gastric mass, was discharged instructed to f/u with GI doc, Dr Vazquez, however per pt GI doc [place] one [place] and was unable to get an appt until May 5th.  Yesterday the pain in her stomach was severe, and she return to the ER.  Cat scan of her abd reports Increase in size of lobulated mass contiguous with greater curvature of stomach since 4/5/2024 with newly evident internal hyperdensity compatible with hemorrhage, Newly evident hyperdense left lateral perisplenic fluid compatible with hemorrhage, Newly evident hyperdense fluid in posterior pelvis compatible with hemorrhage.     Pt is seen at bedside, with sons present, awake, alert, c/o LUQ pain which pt describes as "20 out of 10" at its worse pain, but having rec'd Morphine recently she states her pain is at "2." Pt is npo for upper endo this evening.  Pt denies any problems with anesthesia in the past,  used to run 8 miler per day up until age 66, but has been unable to since 3/ bad arthritis and chronic back pain,  She ambs independently, states she can walk around the block, go grocery shopping without any cp or sob.  Denies any f/c/r, denies any burning or freq on urination, denies any BRBPR or any black stools, no n/v, but states her appetite has decreased significantly      PAST MEDICAL HISTORY: as above    PAST SURGICAL HISTORY: loop recorder implant, APPY, Tonsillectomy, last colonoscopy age 66, right rotator cuff repair, cerebral stent in 2004 ( due to severe headaches??)    FAMILY HISTORY:  mother dec: age 90's natural causes, Father: dec : age 77, h/o RA    SOCIAL HISTORY: former smoker: 1/2-1pk per day, quit 25 years ago, rare ETOH, denies rec drug use    ALLERGIES: contrasr media, PCN, sulf drugs    HOME MEDS: see med rec      Vital Signs Last 24 Hrs  T(C): 37 (23 Apr 2024 07:48), Max: 37 (23 Apr 2024 07:48)  T(F): 98.6 (23 Apr 2024 07:48), Max: 98.6 (23 Apr 2024 07:48)  HR: 58 (23 Apr 2024 07:48) (57 - 71)  BP: 93/43 (23 Apr 2024 07:48) (93/43 - 138/58)  BP(mean): 65 (22 Apr 2024 19:15) (65 - 65)  RR: 16 (23 Apr 2024 07:48) (16 - 18)  SpO2: 97% (23 Apr 2024 07:48) (93% - 98%)    Parameters below as of 23 Apr 2024 07:48  Patient On (Oxygen Delivery Method): nasal cannula  O2 Flow (L/min): 2      REVIEW OF SYSTEMS:   All 10 systems reviewed in detailed and found to be negative with the exception of what has already been described above    MEDICATIONS  (STANDING):  levothyroxine Injectable 35 MICROGram(s) IV Push at bedtime  metoprolol tartrate Injectable 20 milliGRAM(s) IV Push every 12 hours  pantoprazole  Injectable 40 milliGRAM(s) IV Push two times a day  sodium chloride 0.9%. 1000 milliLiter(s) (75 mL/Hr) IV Continuous <Continuous>    MEDICATIONS  (PRN):  acetaminophen   IVPB .. 1000 milliGRAM(s) IV Intermittent every 6 hours PRN Mild Pain (1 - 3)  morphine  - Injectable 2 milliGRAM(s) IV Push every 4 hours PRN Severe Pain (7 - 10)  ondansetron Injectable 4 milliGRAM(s) IV Push every 6 hours PRN Nausea    PHYSICAL EXAM:    GENERAL: Comfortable, no acute distress   HEAD:  Normocephalic, atraumatic  EYES: EOMI, PERRLA  HEENT: Moist mucous membranes  NECK: Supple, No JVD  NERVOUS SYSTEM:  Alert & Oriented X3, Motor Strength 5/5 B/L upper and lower extremities  CHEST/LUNG: Clear to auscultation bilaterally  HEART: S1 S2 RRR, on tele monitor NSR  ABDOMEN: Soft, + LUQ tenderness Nondistended, Bowel sounds present, no N/V, presently NPO  GENITOURINARY: Voiding, no palpable bladder  EXTREMITIES:   No clubbing, cyanosis, or edema  MUSCULOSKELETAL- No muscle tenderness, no joint tenderness  SKIN-no rash    LABS:                      11.3   10.34 )-----------( 115      ( 23 Apr 2024 08:11 )             34.8     04-23    140  |  110<H>  |  20  ----------------------------<  100<H>  4.5   |  26  |  1.46<H>    Ca    9.2      23 Apr 2024 08:11  Phos  3.6     04-23  Mg     2.0     04-23    TPro  7.1  /  Alb  2.9<L>  /  TBili  0.4  /  DBili  x   /  AST  15  /  ALT  12  /  AlkPhos  64  04-22    LIVER FUNCTIONS - ( 22 Apr 2024 17:09 )  Alb: 2.9 g/dL / Pro: 7.1 gm/dL / ALK PHOS: 64 U/L / ALT: 12 U/L / AST: 15 U/L / GGT: x           PT/INR - ( 22 Apr 2024 17:09 )   PT: 16.8 sec;   INR: 1.50 ratio     PTT - ( 22 Apr 2024 17:09 )  PTT:30.4 sec      Urinalysis Basic - ( 23 Apr 2024 08:11 )    Color: x / Appearance: x / SG: x / pH: x  Gluc: 100 mg/dL / Ketone: x  / Bili: x / Urobili: x   Blood: x / Protein: x / Nitrite: x   Leuk Esterase: x / RBC: x / WBC x   Sq Epi: x / Non Sq Epi: x / Bacteria: x        Lactate, Blood: 1.6 mmol/L (04-22 @ 17:09)    Blood, Urine: Negative (04-22 @ 19:53)    EKG NSR:     RADIOLOGY STUDIES:    < from: CT Abdomen and Pelvis No Cont (04.22.24 @ 17:52) >    ACC: [id number] EXAM:  CT ABDOMEN AND PELVIS   ORDERED BY: DAVID WU     PROCEDURE DATE:  04/22/2024        INTERPRETATION:  CLINICAL INFORMATION: Left-sided abdominal pain, mass on   recent CT    COMPARISON: CT abdomen/pelvis 4/5/2024    CONTRAST/COMPLICATIONS:  IV Contrast: NONE  Oral Contrast: NONE  Complications: None reported at time of study completion    PROCEDURE:  CT of the Abdomen and Pelvis was performed.  Sagittal and coronal reformats were performed.    FINDINGS:  LOWER CHEST: Left basilar atelectatic change    LIVER: Normal in size. Subcentimeter hypo-densities too small to   characterize but most likely cysts.  BILE DUCTS: Normal in caliber  GALLBLADDER: No intraluminal calcification or pericholecystic fluid.  SPLEEN: Normal in size. Newly evident lateral hyperdense left perisplenic   fluid compatible with hemorrhage.  PANCREAS: Grossly unremarkable, unenhanced morphology.  ADRENALS: No mass.  KIDNEYS/URETERS: No renal or ureteral calcification or hydronephrosis.   Bilateral renal cysts.    BLADDER: Grossly unremarkable.  REPRODUCTIVE ORGANS: Atrophic uterus.    BOWEL: No acute pathology or bowel obstruction. Increase in the size of   lobulated mass contiguous with greater curvature of stomach with newly   evident internal hyperdensity compatible with hemorrhage.  PERITONEUM: Newly evident hyperdense fluid in posterior pelvis compatible   with hemorrhage. No free air.  VESSELS: Tortuosity and atherosclerotic calcification.  RETROPERITONEUM/LYMPH NODES: No significant lymphadenopathy.  ABDOMINAL WALL: No abnormal mass or fluid collection  BONES: Unchanged in appearance.    IMPRESSION:  Increase in size of lobulated mass contiguous with greater curvature of   stomach since 4/5/2024 with newly evident internal hyperdensity   compatible with hemorrhage.    Newly evident hyperdense left lateral perisplenic fluid compatible with   hemorrhage.    Newly evident hyperdense fluid in posterior pelvis compatible with   hemorrhage.        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  npo for Upper Endo today  IVFs while NPO  Pain control with Morphine  EKG : NSR, no ectopy  labs reviewed  cardiac consult pending  If no cardiac contraindications to surgery patient is medically optimized for the planned procedure with an RCRI Low risk for adverse outcomes/ 0.9% estimated rate of MI< pulm edema, V fib or Cardiac arrest    # HTN, borderline hypotensive now     takes toprol at home  on Lopressor IV here with parameters    # HLD   hold statin for now    # hypothyroidism  cont synthroid IV    #Afib, CV# 4  hold eliquis for possible surgery  cont bb  hold multaq for now    # RA  on Orencia once per month    # vte prophylaxis  hold eliquis for now d/t possible surgery  venodynes for now    dispo: endo today, Poss surgery tomorrow

## 2024-04-23 NOTE — PROGRESS NOTE ADULT - SUBJECTIVE AND OBJECTIVE BOX
Pt seen and examined at bedside, no acute events. Pt complaining of mild diffuse abdominal pain. Denied fever, chills, nausea, vomiting or SOB overnight.     Vital Signs Last 24 Hrs  T(C): 36.6 (23 Apr 2024 00:14), Max: 36.9 (22 Apr 2024 19:15)  T(F): 97.9 (23 Apr 2024 00:14), Max: 98.5 (23 Apr 2024 00:03)  HR: 68 (23 Apr 2024 00:14) (57 - 68)  BP: 119/59 (23 Apr 2024 00:14) (116/53 - 138/58)  BP(mean): 65 (22 Apr 2024 19:15) (65 - 65)  RR: 18 (23 Apr 2024 00:14) (17 - 18)  SpO2: 95% (23 Apr 2024 00:14) (95% - 98%)    Parameters below as of 23 Apr 2024 00:14  Patient On (Oxygen Delivery Method): room air                            13.6   13.17 )-----------( 142      ( 22 Apr 2024 17:09 )             41.3     04-22    137  |  107  |  18  ----------------------------<  103<H>  4.2   |  25  |  1.05    Ca    9.9      22 Apr 2024 17:09    TPro  7.1  /  Alb  2.9<L>  /  TBili  0.4  /  DBili  x   /  AST  15  /  ALT  12  /  AlkPhos  64  04-22    I&O's Summary    Physical Exam:  Pt is AAOx3  General: Well developed, in no acute distress.   Chest: Lungs clear, no rales, no rhonchi, no wheezes.   Heart: RR, no murmurs, no rubs, no gallops.   Abdomen: Soft, diffuse tenderness, mildly distended, no peritonitis, no masses, BS normal.    Back: Normal curvature, no tenderness.   Neuro: Physiological, no localizing findings.   Skin: Normal, no rashes, no lesions noted.   Extremities: Warm, well perfused, no edema, Pulses intact

## 2024-04-23 NOTE — CONSULT NOTE ADULT - SUBJECTIVE AND OBJECTIVE BOX
HPI:  89 F with PMHx of GERD, arthritis, Afib on Eliquis, loop recorder, HTN presents to the ED c/o severe abdominal pain radiating to her back onset this morning and dark colored stools. Denies nausea, vomiting or BRBPR. She was seen a few weeks ago in the ED and found on CT with gastric mass. She was recommended to follow up with her outpatient GI but symptoms developed prompting ED visit.     Currently hemodynamically stable and afebrile. Labs notable for hgb 11.3 (13.6 and 15 prior)      PAST MEDICAL & SURGICAL HISTORY:  Rheumatoid aortitis  rheumatoid arthritis      History of appendectomy      Age-related incipient cataract of both eyes  surgery completed          Home Medications:  Eliquis 5 mg oral tablet: 1 tab(s) orally 2 times a day (22 Apr 2024 22:23)  ezetimibe 10 mg oral tablet: 1 tab(s) orally once a day (22 Apr 2024 22:24)  folic acid 1 mg oral tablet: 1 tab(s) orally once a day (22 Apr 2024 22:24)  gabapentin 300 mg oral capsule: 1 cap(s) orally once a day (in the afternoon) (22 Apr 2024 22:20)  metoprolol succinate 100 mg oral tablet, extended release: 1 tab(s) orally once a day (22 Apr 2024 22:24)  Multaq 400 mg oral tablet: 1 tab(s) orally 2 times a day (22 Apr 2024 22:24)  Orencia ClickJect 125 mg/mL subcutaneous solution: 125 milligram(s) subcutaneously once a month (22 Apr 2024 22:24)  pantoprazole 40 mg oral delayed release tablet: 1 tab(s) orally once a day as needed for  indigestion (22 Apr 2024 22:24)  pravastatin 80 mg oral tablet: 1 tab(s) orally once a day (22 Apr 2024 22:24)  predniSONE 5 mg oral tablet: 1 tab(s) orally once a day (in the afternoon) (22 Apr 2024 22:24)  raloxifene 60 mg oral tablet: 1 tab(s) orally once a day (22 Apr 2024 22:24)  Synthroid 88 mcg (0.088 mg) oral tablet: 1 tab(s) orally once a day (22 Apr 2024 22:24)  Vitamin C 500 mg oral tablet: 1 tab(s) orally once a day (22 Apr 2024 22:24)  Vitamin D3 1250 mcg (50,000 intl units) oral capsule: 1 cap(s) orally once a week on Saturdays (22 Apr 2024 22:24)      MEDICATIONS  (STANDING):  levothyroxine Injectable 35 MICROGram(s) IV Push at bedtime  metoprolol tartrate Injectable 20 milliGRAM(s) IV Push every 12 hours  pantoprazole  Injectable 40 milliGRAM(s) IV Push two times a day  sodium chloride 0.9%. 1000 milliLiter(s) (75 mL/Hr) IV Continuous <Continuous>    MEDICATIONS  (PRN):  acetaminophen   IVPB .. 1000 milliGRAM(s) IV Intermittent every 6 hours PRN Mild Pain (1 - 3)  morphine  - Injectable 2 milliGRAM(s) IV Push every 4 hours PRN Severe Pain (7 - 10)  ondansetron Injectable 4 milliGRAM(s) IV Push every 6 hours PRN Nausea      Allergies    contrast media (iodine-based) (Unknown)  penicillins (Unknown)  sulfa drugs (Unknown)    Intolerances        SOCIAL HISTORY:    FAMILY HISTORY:      ROS  As above  Otherwise unremarkable    Vital Signs Last 24 Hrs  T(C): 37 (23 Apr 2024 07:48), Max: 37 (23 Apr 2024 07:48)  T(F): 98.6 (23 Apr 2024 07:48), Max: 98.6 (23 Apr 2024 07:48)  HR: 58 (23 Apr 2024 07:48) (57 - 71)  BP: 93/43 (23 Apr 2024 07:48) (93/43 - 138/58)  BP(mean): 65 (22 Apr 2024 19:15) (65 - 65)  RR: 16 (23 Apr 2024 07:48) (16 - 18)  SpO2: 97% (23 Apr 2024 07:48) (93% - 98%)    Parameters below as of 23 Apr 2024 07:48  Patient On (Oxygen Delivery Method): nasal cannula  O2 Flow (L/min): 2      Constitutional: NAD, well-developed  Respiratory: CTAB  Cardiovascular: S1 and S2, RRR  Gastrointestinal: BS+, soft, NT/ND  Extremities: No peripheral edema  Psychiatric: Normal mood, normal affect  Skin: No rashes    LABS:                        11.3   10.34 )-----------( 115      ( 23 Apr 2024 08:11 )             34.8     04-23    140  |  110<H>  |  20  ----------------------------<  100<H>  4.5   |  26  |  1.46<H>    Ca    9.2      23 Apr 2024 08:11  Phos  3.6     04-23  Mg     2.0     04-23    TPro  7.1  /  Alb  2.9<L>  /  TBili  0.4  /  DBili  x   /  AST  15  /  ALT  12  /  AlkPhos  64  04-22    PT/INR - ( 22 Apr 2024 17:09 )   PT: 16.8 sec;   INR: 1.50 ratio         PTT - ( 22 Apr 2024 17:09 )  PTT:30.4 sec  LIVER FUNCTIONS - ( 22 Apr 2024 17:09 )  Alb: 2.9 g/dL / Pro: 7.1 gm/dL / ALK PHOS: 64 U/L / ALT: 12 U/L / AST: 15 U/L / GGT: x             RADIOLOGY & ADDITIONAL STUDIES:    ACC: [id number] EXAM:  CT ABDOMEN AND PELVIS   ORDERED BY: DAVID WU     PROCEDURE DATE:  04/22/2024          INTERPRETATION:  CLINICAL INFORMATION: Left-sided abdominal pain, mass on   recent CT    COMPARISON: CT abdomen/pelvis 4/5/2024    CONTRAST/COMPLICATIONS:  IV Contrast: NONE  Oral Contrast: NONE  Complications: None reported at time of study completion    PROCEDURE:  CT of the Abdomen and Pelvis was performed.  Sagittal and coronal reformats were performed.    FINDINGS:  LOWER CHEST: Left basilar atelectatic change    LIVER: Normal in size. Subcentimeter hypodensities too small to   characterize but most likely cysts.  BILE DUCTS: Normal in caliber  GALLBLADDER: No intraluminal calcification or pericholecystic fluid.  SPLEEN: Normal in size. Newly evident lateral hyperdense left perisplenic   fluid compatible with hemorrhage.  PANCREAS: Grossly unremarkable, unenhanced morphology.  ADRENALS: No mass.  KIDNEYS/URETERS: No renal or ureteral calcification or hydronephrosis.   Bilateral renal cysts.    BLADDER: Grossly unremarkable.  REPRODUCTIVE ORGANS: Atrophic uterus.    BOWEL: No acute pathology or bowel obstruction. Increase in the size of   lobulated mass contiguous with greater curvature of stomach with newly   evident internal hyperdensity compatible with hemorrhage.  PERITONEUM: Newly evident hyperdense fluid in posterior pelvis compatible   with hemorrhage. No free air.  VESSELS: Tortuosity and atherosclerotic calcification.  RETROPERITONEUM/LYMPH NODES: No significant lymphadenopathy.  ABDOMINAL WALL: No abnormal mass or fluid collection  BONES: Unchanged in appearance.    IMPRESSION:  Increase in size of lobulated mass contiguous with greater curvature of   stomach since 4/5/2024 with newly evident internal hyperdensity   compatible with hemorrhage.    Newly evident hyperdense left lateral perisplenic fluid compatible with   hemorrhage.    Newly evident hyperdense fluid in posterior pelvis compatible with   hemorrhage.    --- End of Report ---            TUCKER SHELBY MD; Attending Nuclear Medicine  This document has been electronically signed. Apr 22 2024  6:24PM

## 2024-04-23 NOTE — PATIENT PROFILE ADULT - FALL HARM RISK - FALLEN IN PAST
Continued Stay SW/CM Assessment/Plan of Care Note       Active Substitute Decision Maker (SDM)    There are no active Substitute Decision Maker (SDM) on file.           Progress note:  SW spoke with Antonetteprince at [facility] ([phone]) who reports that they have clinically accepted patient and that insurance auth is currently pending at this time.    SW to continue to follow and will assist with discharge planning.    See SW/CM flowsheets for other objective data.    Disposition Recommendations:  Preliminary discharge destination:    SW/CM recommendation for discharge:      Discharge Plan/Needs:     Continued Care and Services - Admitted Since 2/10/2023     Destination  Coordination complete.    Service Provider Request Status Selected Services Address Phone Fax    [facility]  Selected Skilled Nursing [address] [facility] [phone]    [facility] Pending - Request Sent N/A [address] [facility] [phone]    [facility] SNF Pending - Request Sent N/A [address] [facility] [id number] [phone]    [facility] Pending - Request Sent N/A [address] [facility] [phone]    [facility] Pending - Request Sent N/A [address] [facility] [phone]    [facility] Pending - Request Sent N/A [address] [facility] [phone] [phone]    [facility] Pending - Request Sent N/A [address] [phone]    [facility] Pending - Request Sent N/A [address] [facility] [phone]    [facility] Pending - Request Sent N/A [address] [place] [address]  [id number] [phone]    [facility] Pending - Request Sent N/A [address] [facility] [facility] [phone]    [facility] Pending - Request Sent N/A [address] [facility] [phone]    [facility] Pending - Request Sent N/A [address] [facility] [phone]    [facility] SNF Pending - No Request Sent N/A [address] [facility] [address] [address] [phone]    [facility] Pending - No Request Sent N/A [address] [facility] [address] [phone] --    [facility] SNF Declined  Bed not available N/A [address] [facility] [facility] [phone]            Selected Continued Care - Prior Encounters Includes continued care and service providers with selected services from prior encounters from 11/12/2022 to 2/14/2023    Discharged on 2/8/2023 Admission date: 2/2/2023 - Discharge disposition: Skilled Nursing Facility Including SNF Care for Subacute and Rehab    Destination      Service Provider Selected Services Address Phone Fax    [facility] Sk[facility] Nursing [address] [facility] [phone]                        Devices/ Equipment that need to be arranged for discharge:     Accepted   Pending insurance authorization   Others:    Anticipated date of DME availability:     Prior To Hospitalization:    Living Situation: Significant other and residing at Senior apartment.    Support Systems: Children   Home Devices/Equipment: Mobility assist device, Shower chair, Blood glucose monitor   Mobility Assist Devices: Front-wheeled walker   Type of Service Prior to Hospitalization: , Community agency/program, Personal care worker (agency)     Patient/Family  discharge goal (s):        Resources provided:           Therapy Recommendations for Discharge:   PT: Recommendation for Discharge Support: PT WI: 24 Hour assist    OT:       SLP:      Mobility Equipment Recommended for Discharge: owns platform 2ww      Barriers to Discharge  Identified Barriers to Discharge/Transition Planning:                   No

## 2024-04-23 NOTE — CHART NOTE - NSCHARTNOTEFT_GEN_A_CORE
EGD  - Normal esophagus  - 1 cm HH  - Normal stomach with no evidence/stigmata of recent bleeding. No masses or mucosal abnormalities  - Normal duodenum    Recommendation  1. Diet per surgery  2. Rest of care per surgery with tentative plan for procedure on 4/24.   3. Reconsult as needed

## 2024-04-23 NOTE — CONSULT NOTE ADULT - ASSESSMENT
1. 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 Plan for EGD to localize lesion in anticipation for surgery with Dr. Martines  2. NPO  3. PPI 40 mg IVP BID

## 2024-04-24 LAB
ANION GAP SERPL CALC-SCNC: 5 MMOL/L — SIGNIFICANT CHANGE UP (ref 5–17)
ANION GAP SERPL CALC-SCNC: 5 MMOL/L — SIGNIFICANT CHANGE UP (ref 5–17)
BUN SERPL-MCNC: 11 MG/DL — SIGNIFICANT CHANGE UP (ref 7–23)
BUN SERPL-MCNC: 13 MG/DL — SIGNIFICANT CHANGE UP (ref 7–23)
CALCIUM SERPL-MCNC: 9.3 MG/DL — SIGNIFICANT CHANGE UP (ref 8.5–10.1)
CALCIUM SERPL-MCNC: 9.6 MG/DL — SIGNIFICANT CHANGE UP (ref 8.5–10.1)
CHLORIDE SERPL-SCNC: 109 MMOL/L — HIGH (ref 96–108)
CHLORIDE SERPL-SCNC: 110 MMOL/L — HIGH (ref 96–108)
CO2 SERPL-SCNC: 23 MMOL/L — SIGNIFICANT CHANGE UP (ref 22–31)
CO2 SERPL-SCNC: 25 MMOL/L — SIGNIFICANT CHANGE UP (ref 22–31)
CREAT SERPL-MCNC: 0.86 MG/DL — SIGNIFICANT CHANGE UP (ref 0.5–1.3)
CREAT SERPL-MCNC: 1.03 MG/DL — SIGNIFICANT CHANGE UP (ref 0.5–1.3)
EGFR: 52 ML/MIN/1.73M2 — LOW
EGFR: 65 ML/MIN/1.73M2 — SIGNIFICANT CHANGE UP
GLUCOSE SERPL-MCNC: 106 MG/DL — HIGH (ref 70–99)
GLUCOSE SERPL-MCNC: 109 MG/DL — HIGH (ref 70–99)
HCT VFR BLD CALC: 33.2 % — LOW (ref 34.5–45)
HCT VFR BLD CALC: 34.9 % — SIGNIFICANT CHANGE UP (ref 34.5–45)
HGB BLD-MCNC: 10.6 G/DL — LOW (ref 11.5–15.5)
HGB BLD-MCNC: 11.1 G/DL — LOW (ref 11.5–15.5)
INR BLD: 1.25 RATIO — HIGH (ref 0.85–1.18)
MAGNESIUM SERPL-MCNC: 1.7 MG/DL — SIGNIFICANT CHANGE UP (ref 1.6–2.6)
MAGNESIUM SERPL-MCNC: 1.8 MG/DL — SIGNIFICANT CHANGE UP (ref 1.6–2.6)
MCHC RBC-ENTMCNC: 31 PG — SIGNIFICANT CHANGE UP (ref 27–34)
MCHC RBC-ENTMCNC: 31.4 PG — SIGNIFICANT CHANGE UP (ref 27–34)
MCHC RBC-ENTMCNC: 31.8 GM/DL — LOW (ref 32–36)
MCHC RBC-ENTMCNC: 31.9 GM/DL — LOW (ref 32–36)
MCV RBC AUTO: 97.1 FL — SIGNIFICANT CHANGE UP (ref 80–100)
MCV RBC AUTO: 98.9 FL — SIGNIFICANT CHANGE UP (ref 80–100)
PHOSPHATE SERPL-MCNC: 1.6 MG/DL — LOW (ref 2.5–4.5)
PHOSPHATE SERPL-MCNC: 2.1 MG/DL — LOW (ref 2.5–4.5)
PLATELET # BLD AUTO: 136 K/UL — LOW (ref 150–400)
POTASSIUM SERPL-MCNC: 3.8 MMOL/L — SIGNIFICANT CHANGE UP (ref 3.5–5.3)
POTASSIUM SERPL-MCNC: 3.9 MMOL/L — SIGNIFICANT CHANGE UP (ref 3.5–5.3)
POTASSIUM SERPL-SCNC: 3.8 MMOL/L — SIGNIFICANT CHANGE UP (ref 3.5–5.3)
POTASSIUM SERPL-SCNC: 3.9 MMOL/L — SIGNIFICANT CHANGE UP (ref 3.5–5.3)
PROTHROM AB SERPL-ACNC: 14 SEC — HIGH (ref 9.5–13)
RBC # BLD: 3.42 M/UL — LOW (ref 3.8–5.2)
RBC # BLD: 3.53 M/UL — LOW (ref 3.8–5.2)
RBC # FLD: 13.2 % — SIGNIFICANT CHANGE UP (ref 10.3–14.5)
RBC # FLD: 13.4 % — SIGNIFICANT CHANGE UP (ref 10.3–14.5)
SODIUM SERPL-SCNC: 138 MMOL/L — SIGNIFICANT CHANGE UP (ref 135–145)
SODIUM SERPL-SCNC: 139 MMOL/L — SIGNIFICANT CHANGE UP (ref 135–145)
TROPONIN I, HIGH SENSITIVITY RESULT: 11.83 NG/L — SIGNIFICANT CHANGE UP
WBC # BLD: 11.2 K/UL — HIGH (ref 3.8–10.5)
WBC # BLD: 12.59 K/UL — HIGH (ref 3.8–10.5)
WBC # FLD AUTO: 11.2 K/UL — HIGH (ref 3.8–10.5)
WBC # FLD AUTO: 12.59 K/UL — HIGH (ref 3.8–10.5)

## 2024-04-24 PROCEDURE — 72040 X-RAY EXAM NECK SPINE 2-3 VW: CPT | Mod: 26

## 2024-04-24 PROCEDURE — 99232 SBSQ HOSP IP/OBS MODERATE 35: CPT

## 2024-04-24 PROCEDURE — 99223 1ST HOSP IP/OBS HIGH 75: CPT

## 2024-04-24 PROCEDURE — 93010 ELECTROCARDIOGRAM REPORT: CPT

## 2024-04-24 RX ORDER — METOPROLOL TARTRATE 50 MG
5 TABLET ORAL EVERY 6 HOURS
Refills: 0 | Status: DISCONTINUED | OUTPATIENT
Start: 2024-04-24 | End: 2024-04-24

## 2024-04-24 RX ORDER — HYDROMORPHONE HYDROCHLORIDE 2 MG/ML
0.5 INJECTION INTRAMUSCULAR; INTRAVENOUS; SUBCUTANEOUS ONCE
Refills: 0 | Status: DISCONTINUED | OUTPATIENT
Start: 2024-04-24 | End: 2024-04-24

## 2024-04-24 RX ORDER — HYDROCORTISONE 20 MG
50 TABLET ORAL EVERY 8 HOURS
Refills: 0 | Status: DISCONTINUED | OUTPATIENT
Start: 2024-04-24 | End: 2024-04-25

## 2024-04-24 RX ORDER — METOPROLOL TARTRATE 50 MG
100 TABLET ORAL DAILY
Refills: 0 | Status: DISCONTINUED | OUTPATIENT
Start: 2024-04-24 | End: 2024-04-30

## 2024-04-24 RX ORDER — ACETAMINOPHEN 500 MG
650 TABLET ORAL ONCE
Refills: 0 | Status: COMPLETED | OUTPATIENT
Start: 2024-04-24 | End: 2024-04-24

## 2024-04-24 RX ORDER — ATORVASTATIN CALCIUM 80 MG/1
20 TABLET, FILM COATED ORAL AT BEDTIME
Refills: 0 | Status: DISCONTINUED | OUTPATIENT
Start: 2024-04-24 | End: 2024-04-30

## 2024-04-24 RX ORDER — ACETAMINOPHEN 500 MG
650 TABLET ORAL EVERY 6 HOURS
Refills: 0 | Status: DISCONTINUED | OUTPATIENT
Start: 2024-04-24 | End: 2024-04-30

## 2024-04-24 RX ORDER — SODIUM,POTASSIUM PHOSPHATES 278-250MG
1 POWDER IN PACKET (EA) ORAL ONCE
Refills: 0 | Status: COMPLETED | OUTPATIENT
Start: 2024-04-24 | End: 2024-04-24

## 2024-04-24 RX ORDER — LEVOTHYROXINE SODIUM 125 MCG
88 TABLET ORAL DAILY
Refills: 0 | Status: DISCONTINUED | OUTPATIENT
Start: 2024-04-24 | End: 2024-04-30

## 2024-04-24 RX ORDER — SODIUM,POTASSIUM PHOSPHATES 278-250MG
1 POWDER IN PACKET (EA) ORAL ONCE
Refills: 0 | Status: COMPLETED | OUTPATIENT
Start: 2024-04-24 | End: 2024-04-25

## 2024-04-24 RX ORDER — DRONEDARONE 400 MG/1
400 TABLET, FILM COATED ORAL
Refills: 0 | Status: DISCONTINUED | OUTPATIENT
Start: 2024-04-24 | End: 2024-04-26

## 2024-04-24 RX ADMIN — Medication 100 MILLIGRAM(S): at 12:35

## 2024-04-24 RX ADMIN — MORPHINE SULFATE 2 MILLIGRAM(S): 50 CAPSULE, EXTENDED RELEASE ORAL at 08:34

## 2024-04-24 RX ADMIN — ATORVASTATIN CALCIUM 20 MILLIGRAM(S): 80 TABLET, FILM COATED ORAL at 21:41

## 2024-04-24 RX ADMIN — Medication 650 MILLIGRAM(S): at 17:02

## 2024-04-24 RX ADMIN — MORPHINE SULFATE 2 MILLIGRAM(S): 50 CAPSULE, EXTENDED RELEASE ORAL at 22:10

## 2024-04-24 RX ADMIN — MORPHINE SULFATE 2 MILLIGRAM(S): 50 CAPSULE, EXTENDED RELEASE ORAL at 02:30

## 2024-04-24 RX ADMIN — Medication 650 MILLIGRAM(S): at 23:21

## 2024-04-24 RX ADMIN — MORPHINE SULFATE 2 MILLIGRAM(S): 50 CAPSULE, EXTENDED RELEASE ORAL at 02:45

## 2024-04-24 RX ADMIN — Medication 1 PACKET(S): at 12:35

## 2024-04-24 RX ADMIN — SODIUM CHLORIDE 75 MILLILITER(S): 9 INJECTION INTRAMUSCULAR; INTRAVENOUS; SUBCUTANEOUS at 23:57

## 2024-04-24 RX ADMIN — PANTOPRAZOLE SODIUM 40 MILLIGRAM(S): 20 TABLET, DELAYED RELEASE ORAL at 21:42

## 2024-04-24 RX ADMIN — MORPHINE SULFATE 2 MILLIGRAM(S): 50 CAPSULE, EXTENDED RELEASE ORAL at 21:40

## 2024-04-24 RX ADMIN — PANTOPRAZOLE SODIUM 40 MILLIGRAM(S): 20 TABLET, DELAYED RELEASE ORAL at 08:34

## 2024-04-24 RX ADMIN — Medication 650 MILLIGRAM(S): at 22:51

## 2024-04-24 RX ADMIN — MORPHINE SULFATE 2 MILLIGRAM(S): 50 CAPSULE, EXTENDED RELEASE ORAL at 08:47

## 2024-04-24 RX ADMIN — Medication 5 MILLIGRAM(S): at 05:26

## 2024-04-24 RX ADMIN — HYDROMORPHONE HYDROCHLORIDE 0.5 MILLIGRAM(S): 2 INJECTION INTRAMUSCULAR; INTRAVENOUS; SUBCUTANEOUS at 23:43

## 2024-04-24 RX ADMIN — DRONEDARONE 400 MILLIGRAM(S): 400 TABLET, FILM COATED ORAL at 21:41

## 2024-04-24 RX ADMIN — Medication 5 MILLIGRAM(S): at 00:36

## 2024-04-24 NOTE — CHART NOTE - NSCHARTNOTEFT_GEN_A_CORE
Called by nurse due to patient reports of chest discomfort. Upon evaluation, patient reports pain that started in the left side of her neck and traveled down her back and her left chest and left shoulder. Pain worsened after being given morphine 2mg. Patient also reported some shortness of breath.     T(C): 37.6 (04-24-24 @ 22:38), Max: 38 (04-24-24 @ 17:43)  HR: 106 (04-24-24 @ 22:38) (88 - 106)  BP: 162/86 (04-24-24 @ 22:38) (114/53 - 162/86)  RR: 21 (04-24-24 @ 22:38) (16 - 21)  SpO2: 95% (04-24-24 @ 22:38) (95% - 97%)    CONSTITUTIONAL: Well groomed, no apparent distress  RESP: No respiratory distress, no use of accessory muscles; CTA b/l, no WRR  CV: Tachycardia, +S1S2, no MRG; no JVD; no peripheral edema  GI: Soft, NT, ND, no rebound, no guarding; no palpable masses; no hepatosplenomegaly; no hernia palpated    Plan  Chest pain   - Tylenol   - EKG  - Trop, CBC, BMP, mag, phos

## 2024-04-24 NOTE — PROGRESS NOTE ADULT - ASSESSMENT
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 Martines

## 2024-04-24 NOTE — CONSULT NOTE ADULT - ASSESSMENT
89 F with PMHx of GERD, arthritis, Afib on Eliquis, loop recorder, HTN presents to the ED c/o severe abdominal pain radiating to her back onset this morning and dark colored stools. Denies nausea, vomiting or BRBPR. She was seen a few weeks ago in the ED and found on CT with gastric mass. She was recommended to follow up with her outpatient GI but symptoms developed prompting ED visit.  anticipated surgery tomorrow.      rheum asked to evaluated given context of RA and imminent surgery.     #RA   diagnosed at least 10 years ago.  Has been on treatment with remicade and most currently orencia (IV) - last dose several weeks ago by recollection.  she denies other current csDMARD use at this time.   However she also is on chronic steroids (prednisone 5 mg daily) for at least 2 years which keeps her shoulder and hip joint pain controlled.   Currently with soft, cool synovitis on exam and chronic deformities from RA.   -- no s/s of active RA at this time   -- s/p orencia - is at increased risk of infection - patient aware.  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     -- message left with her primary rheumatologist, Dr. Enriquez of [facility], for any collateral information     d/w NP    Claudia Jeff MD  [facility]  [address] [place] [address] 89 F with PMHx of GERD, arthritis, Afib on Eliquis, loop recorder, HTN presents to the ED c/o severe abdominal pain radiating to her back onset this morning and dark colored stools. Denies nausea, vomiting or BRBPR. She was seen a few weeks ago in the ED and found on CT with gastric mass. She was recommended to follow up with her outpatient GI but symptoms developed prompting ED visit.  anticipated surgery tomorrow.      rheum asked to evaluated given context of RA and imminent surgery.     #RA   diagnosed at least 10 years ago.  Has been on treatment with remicade and most currently orencia (IV) - last dose several weeks ago by recollection.  she denies other current csDMARD use at this time.   However she also is on chronic steroids (prednisone 5 mg daily) for at least 2 years which keeps her shoulder and hip joint pain controlled.   Currently with soft, cool synovitis on exam and chronic deformities from RA.   -- no s/s of active RA at this time   -- s/p orencia - is at increased risk of infection - patient aware.  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     -- message left with her primary rheumatologist, Dr. Enriquez of [facility], for any collateral information     d/w NP- hospitalist    Claudia Jeff MD  [facility]  [address] [place] [address]

## 2024-04-24 NOTE — PROGRESS NOTE ADULT - ATTENDING COMMENTS
I have seen and examined this patient and agree with the residents findings. Awaiting evaluations regarding her cardiac and arthritis management. Will continue steroids if necessary. She will need stress dosing.

## 2024-04-24 NOTE — PROGRESS NOTE ADULT - SUBJECTIVE AND OBJECTIVE BOX
Pt seen and examined at bedside, no acute events. Pt complaining of mild diffuse abdominal pain. Denied fever, chills, nausea, vomiting or SOB overnight.     Vital Signs Last 24 Hrs  T(C): 37.1 (24 Apr 2024 05:20), Max: 37.3 (24 Apr 2024 00:00)  T(F): 98.7 (24 Apr 2024 05:20), Max: 99.1 (24 Apr 2024 00:00)  HR: 88 (24 Apr 2024 05:20) (58 - 88)  BP: 133/59 (24 Apr 2024 05:20) (93/43 - 133/59)  BP(mean): 76 (23 Apr 2024 16:09) (76 - 76)  RR: 16 (24 Apr 2024 05:20) (16 - 18)  SpO2: 97% (24 Apr 2024 05:20) (93% - 100%)    Parameters below as of 24 Apr 2024 05:20  Patient On (Oxygen Delivery Method): nasal cannula  O2 Flow (L/min): 2    MEDICATIONS  (STANDING):  levothyroxine Injectable 44 MICROGram(s) IV Push at bedtime  metoprolol tartrate Injectable 5 milliGRAM(s) IV Push every 6 hours  pantoprazole  Injectable 40 milliGRAM(s) IV Push two times a day  sodium chloride 0.9%. 1000 milliLiter(s) (75 mL/Hr) IV Continuous <Continuous>    MEDICATIONS  (PRN):  acetaminophen   IVPB .. 1000 milliGRAM(s) IV Intermittent every 6 hours PRN Mild Pain (1 - 3)  morphine  - Injectable 2 milliGRAM(s) IV Push every 4 hours PRN Severe Pain (7 - 10)  ondansetron Injectable 4 milliGRAM(s) IV Push every 6 hours PRN Nausea                          10.3   11.76 )-----------( 111      ( 23 Apr 2024 18:10 )             32.4     04-23    140  |  110<H>  |  20  ----------------------------<  100<H>  4.5   |  26  |  1.46<H>    Ca    9.2      23 Apr 2024 08:11  Phos  3.6     04-23  Mg     2.0     04-23    TPro  7.1  /  Alb  2.9<L>  /  TBili  0.4  /  DBili  x   /  AST  15  /  ALT  12  /  AlkPhos  64  04-22    I&O's Summary    22 Apr 2024 07:01  -  23 Apr 2024 07:00  --------------------------------------------------------  IN: 550 mL / OUT: 0 mL / NET: 550 mL        Culture - Urine (collected 22 Apr 2024 19:53)  Source: Clean Catch Clean Catch (Midstream)  Final Report (23 Apr 2024 22:41):    <10,000 CFU/mL Normal Urogenital Kika        Physical Exam:  Pt is AAOx3  General: Well developed, in no acute distress.   Chest: Lungs clear, no rales, no rhonchi, no wheezes.   Heart: RR, no murmurs, no rubs, no gallops.   Abdomen: Soft, diffuse tenderness, mildly distended, no peritonitis, no masses, BS normal.    Back: Normal curvature, no tenderness.   Neuro: Physiological, no localizing findings.   Skin: Normal, no rashes, no lesions noted.   Extremities: Warm, well perfused, no edema, Pulses intact

## 2024-04-24 NOTE — CONSULT NOTE ADULT - SUBJECTIVE AND OBJECTIVE BOX
CHIEF COMPLAINT: Patient is a 89y old  Female who presents with a chief complaint of bleeding GIST (24 Apr 2024 05:41)      HPI: HPI:  88 y/o female with PMHx of GERD, arthritis, Afib on Eliquis, loop recorder, HTN presents to the ED c/o severe abdominal pain radiating to her back onset this morning. Additionally reports dark stools, however possible in the setting of taking Pepto-Bismol and ?Magnesia. Patient presented to ED a few weeks ago with similar symptoms, was found to have a mass in the stomach, and was discharged with GI follow up. Pt did not make it to GI for follow up. Denies nausea, vomiting, diarrhea, constipation, chest pain, sob. Allergic to sulfa, Penicillin, and IV contrast. (22 Apr 2024 21:47)      PMHx: PAST MEDICAL & SURGICAL HISTORY:  Rheumatoid aortitis  rheumatoid arthritis      History of appendectomy      Age-related incipient cataract of both eyes  surgery completed            Soc Hx:     FAMILY HISTORY:      Allergies: Allergies    contrast media (iodine-based) (Unknown)  penicillins (Unknown)  sulfa drugs (Unknown)    Intolerances          REVIEW OF SYSTEMS:    As above  No chest pain or shortness of breath  No lightheadeness or syncope  No leg swelling  No palpitations  No claudication-like symptoms    Vital Signs Last 24 Hrs  T(C): 37.1 (24 Apr 2024 05:20), Max: 37.3 (24 Apr 2024 00:00)  T(F): 98.7 (24 Apr 2024 05:20), Max: 99.1 (24 Apr 2024 00:00)  HR: 88 (24 Apr 2024 05:20) (58 - 88)  BP: 133/59 (24 Apr 2024 05:20) (93/43 - 133/59)  BP(mean): 76 (23 Apr 2024 16:09) (76 - 76)  RR: 16 (24 Apr 2024 05:20) (16 - 18)  SpO2: 97% (24 Apr 2024 05:20) (93% - 100%)    Parameters below as of 24 Apr 2024 05:20  Patient On (Oxygen Delivery Method): nasal cannula  O2 Flow (L/min): 2      I&O's Summary      CAPILLARY BLOOD GLUCOSE          PHYSICAL EXAM:   Patient in NAD  Neck: No JVD; Carotids:  2+ without bruits  Respiratory:  Clear to A&P  Cardiovascular: S1 and S2, regular rate and rhythm, no Murmurs, gallops or rubs  Gastrointestinal:  Soft, non-tender; BS positive  Extremities: No peripheral edema  Vascular: 2+ peripheral pulses  Neurological: A/O x 3, no focal deficits      MEDICATIONS:  MEDICATIONS  (STANDING):  levothyroxine Injectable 44 MICROGram(s) IV Push at bedtime  metoprolol tartrate Injectable 5 milliGRAM(s) IV Push every 6 hours  pantoprazole  Injectable 40 milliGRAM(s) IV Push two times a day  sodium chloride 0.9%. 1000 milliLiter(s) (75 mL/Hr) IV Continuous <Continuous>    Home Medications:  Eliquis 5 mg oral tablet: 1 tab(s) orally 2 times a day (22 Apr 2024 22:23)  ezetimibe 10 mg oral tablet: 1 tab(s) orally once a day (22 Apr 2024 22:24)  folic acid 1 mg oral tablet: 1 tab(s) orally once a day (22 Apr 2024 22:24)  gabapentin 300 mg oral capsule: 1 cap(s) orally once a day (in the afternoon) (22 Apr 2024 22:20)  metoprolol succinate 100 mg oral tablet, extended release: 1 tab(s) orally once a day (22 Apr 2024 22:24)  Multaq 400 mg oral tablet: 1 tab(s) orally 2 times a day (22 Apr 2024 22:24)  Orencia ClickJect 125 mg/mL subcutaneous solution: 125 milligram(s) subcutaneously once a month (22 Apr 2024 22:24)  pantoprazole 40 mg oral delayed release tablet: 1 tab(s) orally once a day as needed for  indigestion (22 Apr 2024 22:24)  pravastatin 80 mg oral tablet: 1 tab(s) orally once a day (22 Apr 2024 22:24)  predniSONE 5 mg oral tablet: 1 tab(s) orally once a day (in the afternoon) (22 Apr 2024 22:24)  raloxifene 60 mg oral tablet: 1 tab(s) orally once a day (22 Apr 2024 22:24)  Synthroid 88 mcg (0.088 mg) oral tablet: 1 tab(s) orally once a day (22 Apr 2024 22:24)  Vitamin C 500 mg oral tablet: 1 tab(s) orally once a day (22 Apr 2024 22:24)  Vitamin D3 1250 mcg (50,000 intl units) oral capsule: 1 cap(s) orally once a week on Saturdays (22 Apr 2024 22:24)        LABS: All Labs Reviewed:  Blood Culture: Organism --  Gram Stain Blood -- Gram Stain --  Specimen Source Clean Catch Clean Catch (Midstream)  Culture-Blood --      BNP   CBC             WBC Count: 11.76 K/uL (04-23 @ 18:10)  WBC Count: 10.34 K/uL (04-23 @ 08:11)              Hemoglobin: 10.3 g/dL (04-23 @ 18:10)  Hemoglobin: 11.3 g/dL (04-23 @ 08:11)  Hemoglobin: 13.6 g/dL (04-22 @ 17:09)              Hematocrit: 32.4 % (04-23 @ 18:10)  Hematocrit: 34.8 % (04-23 @ 08:11)  Hematocrit: 41.3 % (04-22 @ 17:09)              Mean Cell Volume: 97.9 fl (04-23 @ 18:10)  Mean Cell Volume: 96.7 fl (04-23 @ 08:11)  Mean Cell Volume: 95.2 fl (04-22 @ 17:09)              Platelet Count - Automated: 111 K/uL (04-23 @ 18:10)  Platelet Count - Automated: 115 K/uL (04-23 @ 08:11)  Platelet Count - Automated: 142 K/uL (04-22 @ 17:09)                            Cardiac markers                                        Chems        Sodium: 140 mmol/L (04-23 @ 08:11)  Sodium: 137 mmol/L (04-22 @ 17:09)          Potassium: 4.5 mmol/L (04-23 @ 08:11)  Potassium: 4.2 mmol/L (04-22 @ 17:09)          Blood Urea Nitrogen: 20 mg/dL (04-23 @ 08:11)  Blood Urea Nitrogen: 18 mg/dL (04-22 @ 17:09)          Creatinine 1.46  Creatinine 1.05          Magnesium: 2.0 mg/dL (04-23 @ 08:11)          Protein Total: 7.1 gm/dL (04-22 @ 17:09)                  Calcium: 9.2 mg/dL (04-23 @ 08:11)  Calcium: 9.9 mg/dL (04-22 @ 17:09)          Phosphorus: 3.6 mg/dL (04-23 @ 08:11)          Bilirubin Total: 0.4 mg/dL (04-22 @ 17:09)          Alanine Aminotransferase (ALT/SGPT): 12 U/L (04-22 @ 17:09)          Aspartate Aminotransferase (AST/SGOT): 15 U/L (04-22 @ 17:09)                 INR: 1.50 ratio (04-22 @ 17:09)             RADIOLOGY:< from: CT Abdomen and Pelvis No Cont (04.22.24 @ 17:52) >  IMPRESSION:  Increase in size of lobulated mass contiguous with greater curvature of   stomach since 4/5/2024 with newly evident internal hyperdensity   compatible with hemorrhage.    Newly evident hyperdense left lateral perisplenic fluid compatible with   hemorrhage.    Newly evident hyperdense fluid in posterior pelvis compatible with   hemorrhage.    < end of copied text >      EKG: < from: 12 Lead ECG (04.23.24 @ 14:24) >  Diagnosis Line Normal sinus rhythm  Incomplete right bundle branch block  Borderline ECG  When compared with ECG of 05-APR-2024 19:49,  Incomplete right bundle branch block is now Present  Confirmed by MD JUAN, NYDIA ([id number]) on 4/23/2024 5:43:11 PM    < end of copied text >      Telemetry:    ECHO:

## 2024-04-24 NOTE — CONSULT NOTE ADULT - ASSESSMENT
89 year old woman with the above PMH re-admitted with recurrent abdominal symptoms as described above.  As described in the CT scan there is evidence for multiple areas of hemorrhage and I agree with holding her apixaban.  She was supposed to see GI as an outpatient but will be seen as an inpatient.  Her cardiac status appears stable.

## 2024-04-24 NOTE — CONSULT NOTE ADULT - SUBJECTIVE AND OBJECTIVE BOX
HPI:  89 F with PMHx of GERD, arthritis, Afib on Eliquis, loop recorder, HTN presents to the ED c/o severe abdominal pain radiating to her back onset this morning and dark colored stools. Denies nausea, vomiting or BRBPR. She was seen a few weeks ago in the ED and found on CT with gastric mass. She was recommended to follow up with her outpatient GI but symptoms developed prompting ED visit.     Currently hemodynamically stable and afebrile. Labs notable for hgb 11.3 (13.6 and 15 prior)      PAST MEDICAL & SURGICAL HISTORY:  Rheumatoid aortitis  rheumatoid arthritis      History of appendectomy      Age-related incipient cataract of both eyes  surgery completed          Home Medications:  Eliquis 5 mg oral tablet: 1 tab(s) orally 2 times a day (22 Apr 2024 22:23)  ezetimibe 10 mg oral tablet: 1 tab(s) orally once a day (22 Apr 2024 22:24)  folic acid 1 mg oral tablet: 1 tab(s) orally once a day (22 Apr 2024 22:24)  gabapentin 300 mg oral capsule: 1 cap(s) orally once a day (in the afternoon) (22 Apr 2024 22:20)  metoprolol succinate 100 mg oral tablet, extended release: 1 tab(s) orally once a day (22 Apr 2024 22:24)  Multaq 400 mg oral tablet: 1 tab(s) orally 2 times a day (22 Apr 2024 22:24)  Orencia ClickJect 125 mg/mL subcutaneous solution: 125 milligram(s) subcutaneously once a month (22 Apr 2024 22:24)  pantoprazole 40 mg oral delayed release tablet: 1 tab(s) orally once a day as needed for  indigestion (22 Apr 2024 22:24)  pravastatin 80 mg oral tablet: 1 tab(s) orally once a day (22 Apr 2024 22:24)  predniSONE 5 mg oral tablet: 1 tab(s) orally once a day (in the afternoon) (22 Apr 2024 22:24)  raloxifene 60 mg oral tablet: 1 tab(s) orally once a day (22 Apr 2024 22:24)  Synthroid 88 mcg (0.088 mg) oral tablet: 1 tab(s) orally once a day (22 Apr 2024 22:24)  Vitamin C 500 mg oral tablet: 1 tab(s) orally once a day (22 Apr 2024 22:24)  Vitamin D3 1250 mcg (50,000 intl units) oral capsule: 1 cap(s) orally once a week on Saturdays (22 Apr 2024 22:24)      MEDICATIONS  (STANDING):  levothyroxine Injectable 35 MICROGram(s) IV Push at bedtime  metoprolol tartrate Injectable 20 milliGRAM(s) IV Push every 12 hours  pantoprazole  Injectable 40 milliGRAM(s) IV Push two times a day  sodium chloride 0.9%. 1000 milliLiter(s) (75 mL/Hr) IV Continuous <Continuous>    MEDICATIONS  (PRN):  acetaminophen   IVPB .. 1000 milliGRAM(s) IV Intermittent every 6 hours PRN Mild Pain (1 - 3)  morphine  - Injectable 2 milliGRAM(s) IV Push every 4 hours PRN Severe Pain (7 - 10)  ondansetron Injectable 4 milliGRAM(s) IV Push every 6 hours PRN Nausea      Allergies    contrast media (iodine-based) (Unknown)  penicillins (Unknown)  sulfa drugs (Unknown)    Intolerances        SOCIAL HISTORY:    FAMILY HISTORY:      ROS  As above  Otherwise unremarkable    Vital Signs Last 24 Hrs  T(C): 37 (23 Apr 2024 07:48), Max: 37 (23 Apr 2024 07:48)  T(F): 98.6 (23 Apr 2024 07:48), Max: 98.6 (23 Apr 2024 07:48)  HR: 58 (23 Apr 2024 07:48) (57 - 71)  BP: 93/43 (23 Apr 2024 07:48) (93/43 - 138/58)  BP(mean): 65 (22 Apr 2024 19:15) (65 - 65)  RR: 16 (23 Apr 2024 07:48) (16 - 18)  SpO2: 97% (23 Apr 2024 07:48) (93% - 98%)    Parameters below as of 23 Apr 2024 07:48  Patient On (Oxygen Delivery Method): nasal cannula  O2 Flow (L/min): 2      Constitutional: NAD, well-developed  Respiratory: CTAB  Cardiovascular: S1 and S2, RRR  Gastrointestinal: BS+, soft, NT/ND  Extremities: No peripheral edema  Psychiatric: Normal mood, normal affect  Skin: No rashes    LABS:                        11.3   10.34 )-----------( 115      ( 23 Apr 2024 08:11 )             34.8     04-23    140  |  110<H>  |  20  ----------------------------<  100<H>  4.5   |  26  |  1.46<H>    Ca    9.2      23 Apr 2024 08:11  Phos  3.6     04-23  Mg     2.0     04-23    TPro  7.1  /  Alb  2.9<L>  /  TBili  0.4  /  DBili  x   /  AST  15  /  ALT  12  /  AlkPhos  64  04-22    PT/INR - ( 22 Apr 2024 17:09 )   PT: 16.8 sec;   INR: 1.50 ratio         PTT - ( 22 Apr 2024 17:09 )  PTT:30.4 sec  LIVER FUNCTIONS - ( 22 Apr 2024 17:09 )  Alb: 2.9 g/dL / Pro: 7.1 gm/dL / ALK PHOS: 64 U/L / ALT: 12 U/L / AST: 15 U/L / GGT: x             RADIOLOGY & ADDITIONAL STUDIES:    ACC: [id number] EXAM:  CT ABDOMEN AND PELVIS   ORDERED BY: DAVID WU     PROCEDURE DATE:  04/22/2024          INTERPRETATION:  CLINICAL INFORMATION: Left-sided abdominal pain, mass on   recent CT    COMPARISON: CT abdomen/pelvis 4/5/2024    CONTRAST/COMPLICATIONS:  IV Contrast: NONE  Oral Contrast: NONE  Complications: None reported at time of study completion    PROCEDURE:  CT of the Abdomen and Pelvis was performed.  Sagittal and coronal reformats were performed.    FINDINGS:  LOWER CHEST: Left basilar atelectatic change    LIVER: Normal in size. Subcentimeter hypodensities too small to   characterize but most likely cysts.  BILE DUCTS: Normal in caliber  GALLBLADDER: No intraluminal calcification or pericholecystic fluid.  SPLEEN: Normal in size. Newly evident lateral hyperdense left perisplenic   fluid compatible with hemorrhage.  PANCREAS: Grossly unremarkable, unenhanced morphology.  ADRENALS: No mass.  KIDNEYS/URETERS: No renal or ureteral calcification or hydronephrosis.   Bilateral renal cysts.    BLADDER: Grossly unremarkable.  REPRODUCTIVE ORGANS: Atrophic uterus.    BOWEL: No acute pathology or bowel obstruction. Increase in the size of   lobulated mass contiguous with greater curvature of stomach with newly   evident internal hyperdensity compatible with hemorrhage.  PERITONEUM: Newly evident hyperdense fluid in posterior pelvis compatible   with hemorrhage. No free air.  VESSELS: Tortuosity and atherosclerotic calcification.  RETROPERITONEUM/LYMPH NODES: No significant lymphadenopathy.  ABDOMINAL WALL: No abnormal mass or fluid collection  BONES: Unchanged in appearance.    IMPRESSION:  Increase in size of lobulated mass contiguous with greater curvature of   stomach since 4/5/2024 with newly evident internal hyperdensity   compatible with hemorrhage.    Newly evident hyperdense left lateral perisplenic fluid compatible with   hemorrhage.    Newly evident hyperdense fluid in posterior pelvis compatible with   hemorrhage.    --- End of Report ---            TUCKER SHELBY MD; Attending Nuclear Medicine  This document has been electronically signed. Apr 22 2024  6:24PM   HPI:  89 F with PMHx of GERD, arthritis, Afib on Eliquis, loop recorder, HTN presents to the ED c/o severe abdominal pain radiating to her back onset this morning and dark colored stools. Denies nausea, vomiting or BRBPR. She was seen a few weeks ago in the ED and found on CT with gastric mass. She was recommended to follow up with her outpatient GI but symptoms developed prompting ED visit.  anticipated surgery tomorrow.      rheum asked to evaluated given context of RA and imminent surgery.     RA   - diagnosed at least 10 years ago.  Has been on treatment with remicade and most currently orencia (IV) - last dose several weeks ago by recollection.  she denies other current csDMARD use at this time.   However she also is on chronic steroids (prednisone 5 mg daily) for at least 2 years which keeps her shoulder and hip joint pain controlled.   - she denies current or recent joint pain   - her hands are always swollen, but stable and no change recently   - she has never had neck issues and no current pain anywhere     RHEUM ROS: negative  PAST MEDICAL HISTORY: Rheumatoid arthritis, GERD, Afib  PAST SURGICAL HISTORY: appendectomy, Cataract  Allergies: contrast media (iodine-based) (Unknown), penicillins (Unknown), sulfa drugs (Unknown)  Social History: non-smoker  FAMILY HISTORY: NC    MEDICATIONS  (STANDING):  atorvastatin 20 milliGRAM(s) Oral at bedtime  hydrocortisone sodium succinate Injectable 50 milliGRAM(s) IV Push every 8 hours  levothyroxine 88 MICROGram(s) Oral daily  metoprolol succinate  milliGRAM(s) Oral daily  pantoprazole  Injectable 40 milliGRAM(s) IV Push two times a day  potassium phosphate / sodium phosphate Powder (PHOS-NaK) 1 Packet(s) Oral once  sodium chloride 0.9%. 1000 milliLiter(s) (75 mL/Hr) IV Continuous <Continuous>    MEDICATIONS  (PRN):  acetaminophen   IVPB .. 1000 milliGRAM(s) IV Intermittent every 6 hours PRN Mild Pain (1 - 3)  morphine  - Injectable 2 milliGRAM(s) IV Push every 4 hours PRN Severe Pain (7 - 10)  ondansetron Injectable 4 milliGRAM(s) IV Push every 6 hours PRN Nausea    ROS  As above  Otherwise unremarkable    Vital Signs Last 24 Hrs  T(C): 37.4 (24 Apr 2024 08:23), Max: 37.4 (24 Apr 2024 08:23)  T(F): 99.3 (24 Apr 2024 08:23), Max: 99.3 (24 Apr 2024 08:23)  HR: 90 (24 Apr 2024 08:23) (70 - 90)  BP: 114/53 (24 Apr 2024 08:23) (114/53 - 133/59)  BP(mean): 76 (23 Apr 2024 16:09) (76 - 76)  RR: 16 (24 Apr 2024 08:23) (16 - 18)  SpO2: 95% (24 Apr 2024 08:23) (93% - 100%)    Parameters below as of 24 Apr 2024 08:23  Patient On (Oxygen Delivery Method): nasal cannula  O2 Flow (L/min): 3      PHYSICAL EXAM  Constitutional: NAD, well-developed  Respiratory:  no respiratory distress  MSK: soft, cool synovitis at both MCP 2,3,4 but worse on the right compared iwth the left, ulnar deviation present right worse than left  Extremities: No peripheral edema  Psychiatric: Normal mood, normal affect  Skin: No rashes, no nodules   Abd:  very ttp luq (to light palpation)    LABS:                                     11.1   12.59 )-----------( 136      ( 24 Apr 2024 07:38 )             34.9     04-24    138  |  110<H>  |  13  ----------------------------<  106<H>  3.8   |  23  |  1.03    Ca    9.3      24 Apr 2024 07:38  Phos  2.1     04-24  Mg     1.8     04-24    TPro  7.1  /  Alb  2.9<L>  /  TBili  0.4  /  DBili  x   /  AST  15  /  ALT  12  /  AlkPhos  64  04-22      RADIOLOGY & ADDITIONAL STUDIES:    ACC: [id number] EXAM:  CT ABDOMEN AND PELVIS   ORDERED BY: DAVID WU     PROCEDURE DATE:  04/22/2024          INTERPRETATION:  CLINICAL INFORMATION: Left-sided abdominal pain, mass on   recent CT    COMPARISON: CT abdomen/pelvis 4/5/2024    CONTRAST/COMPLICATIONS:  IV Contrast: NONE  Oral Contrast: NONE  Complications: None reported at time of study completion    PROCEDURE:  CT of the Abdomen and Pelvis was performed.  Sagittal and coronal reformats were performed.    FINDINGS:  LOWER CHEST: Left basilar atelectatic change    LIVER: Normal in size. Subcentimeter hypodensities too small to   characterize but most likely cysts.  BILE DUCTS: Normal in caliber  GALLBLADDER: No intraluminal calcification or pericholecystic fluid.  SPLEEN: Normal in size. Newly evident lateral hyperdense left perisplenic   fluid compatible with hemorrhage.  PANCREAS: Grossly unremarkable, unenhanced morphology.  ADRENALS: No mass.  KIDNEYS/URETERS: No renal or ureteral calcification or hydronephrosis.   Bilateral renal cysts.    BLADDER: Grossly unremarkable.  REPRODUCTIVE ORGANS: Atrophic uterus.    BOWEL: No acute pathology or bowel obstruction. Increase in the size of   lobulated mass contiguous with greater curvature of stomach with newly   evident internal hyperdensity compatible with hemorrhage.  PERITONEUM: Newly evident hyperdense fluid in posterior pelvis compatible   with hemorrhage. No free air.  VESSELS: Tortuosity and atherosclerotic calcification.  RETROPERITONEUM/LYMPH NODES: No significant lymphadenopathy.  ABDOMINAL WALL: No abnormal mass or fluid collection  BONES: Unchanged in appearance.    IMPRESSION:  Increase in size of lobulated mass contiguous with greater curvature of   stomach since 4/5/2024 with newly evident internal hyperdensity   compatible with hemorrhage.    Newly evident hyperdense left lateral perisplenic fluid compatible with   hemorrhage.    Newly evident hyperdense fluid in posterior pelvis compatible with   hemorrhage.    --- End of Report ---            TUCKER SHELBY MD; Attending Nuclear Medicine  This document has been electronically signed. Apr 22 2024  6:24PM

## 2024-04-24 NOTE — PROGRESS NOTE ADULT - SUBJECTIVE AND OBJECTIVE BOX
PCP:  Dr ludmila James    CHIEF COMPLAINT: Luq pain    HISTORY OF THE PRESENT ILLNESS: this is an 89 yo female with pmh: DDD, HTN, HLD, hypothyroidism, afib on eliquis ( last dose yesterday am), has loop recorder, RA on Orencia, OP, who came to the ER with abd pain on 4/5, ct scan at that time reported gastric mass, was discharged instructed to f/u with GI doc, Dr Vazquez, however per pt GI doc [place] in [place] office one day a week and was unable to get an appt until May 5th.  Yesterday the pain in her stomach was severe, and she return to the ER.  Cat scan of her abd reports Increase in size of lobulated mass contiguous with greater curvature of stomach since 4/5/2024 with newly evident internal hyperdensity compatible with hemorrhage, Newly evident hyperdense left lateral perisplenic fluid compatible with hemorrhage, Newly evident hyperdense fluid in posterior pelvis compatible with hemorrhage.     Pt is seen at bedside, with sons present, awake, alert, c/o LUQ pain which pt describes as "20 out of 10" at its worse pain, but having rec'd Morphine recently she states her pain is at "2." Pt is npo for upper endo this evening.  Pt denies any problems with anesthesia in the past,  used to run 8 miler per day up until age 66, but has been unable to since 3/ bad arthritis and chronic back pain,  She ambs independently, states she can walk around the block, go grocery shopping without any cp or sob.  Denies any f/c/r, denies any burning or freq on urination, denies any BRBPR or any black stools, no n/v, but states her appetite has decreased significantly      seen at bedside, having a great deal of pain in Left upper quadrant, medicated prn with morphine, for surgery tomorrow. demoes any CP or sob      REVIEW OF SYSTEMS:   All 10 systems reviewed in detailed and found to be negative with the exception of what has already been described above    Vital Signs Last 24 Hrs  T(C): 37.4 (04-24-24 @ 08:23), Max: 37.4 (04-24-24 @ 08:23)  T(F): 99.3 (04-24-24 @ 08:23), Max: 99.3 (04-24-24 @ 08:23)  HR: 90 (04-24-24 @ 08:23) (70 - 90)  BP: 114/53 (04-24-24 @ 08:23) (114/53 - 133/59)  BP(mean): 76 (04-23-24 @ 16:09) (76 - 76)  RR: 16 (04-24-24 @ 08:23) (16 - 18)  SpO2: 95% (04-24-24 @ 08:23) (93% - 100%)      MEDICATIONS  (STANDING):      atorvastatin 20 milliGRAM(s) Oral at bedtime  levothyroxine 88 MICROGram(s) Oral daily  metoprolol succinate  milliGRAM(s) Oral daily  pantoprazole  Injectable 40 milliGRAM(s) IV Push two times a day  potassium phosphate / sodium phosphate Powder (PHOS-NaK) 1 Packet(s) Oral once  sodium chloride 0.9%. 1000 milliLiter(s) (75 mL/Hr) IV Continuous <Continuous>    MEDICATIONS  (PRN):  acetaminophen   IVPB .. 1000 milliGRAM(s) IV Intermittent every 6 hours PRN Mild Pain (1 - 3)  morphine  - Injectable 2 milliGRAM(s) IV Push every 4 hours PRN Severe Pain (7 - 10)  ondansetron Injectable 4 milliGRAM(s) IV Push every 6 hours PRN Nausea    today's labs                        11.1   12.59 )-----------( 136      ( 24 Apr 2024 07:38 )             34.9       04-24    138  |  110<H>  |  13  ----------------------------<  106<H>  3.8   |  23  |  1.03    Ca    9.3      24 Apr 2024 07:38  Phos  2.1     04-24  Mg     1.8     04-24    TPro  7.1  /  Alb  2.9<L>  /  TBili  0.4  /  DBili  x   /  AST  15  /  ALT  12  /  AlkPhos  64  04-22        GENERAL: Comfortable, no acute distress   HEAD:  Normocephalic, atraumatic  EYES: EOMI, PERRLA  HEENT: Moist mucous membranes  NECK: Supple, No JVD  NERVOUS SYSTEM:  Alert & Oriented X3, Motor Strength 5/5 B/L upper and lower extremities  CHEST/LUNG: Clear to auscultation bilaterally  HEART: S1 S2 RRR, on tele monitor NSR  ABDOMEN: Soft, + LUQ pain, Nondistended, Bowel sounds present, no N/V, rafiq po  GENITOURINARY: Voiding, no palpable bladder  EXTREMITIES:   No clubbing, cyanosis, or edema  MUSCULOSKELETAL- No muscle tenderness, no joint tenderness  SKIN-no rash  RADIOLOGY STUDIES:    < from: CT Abdomen and Pelvis No Cont (04.22.24 @ 17:52) >    ACC: [id number] EXAM:  CT ABDOMEN AND PELVIS   ORDERED BY: DAVID WU     PROCEDURE DATE:  04/22/2024        INTERPRETATION:  CLINICAL INFORMATION: Left-sided abdominal pain, mass on   recent CT    COMPARISON: CT abdomen/pelvis 4/5/2024    CONTRAST/COMPLICATIONS:  IV Contrast: NONE  Oral Contrast: NONE  Complications: None reported at time of study completion    PROCEDURE:  CT of the Abdomen and Pelvis was performed.  Sagittal and coronal reformats were performed.    FINDINGS:  LOWER CHEST: Left basilar atelectatic change    LIVER: Normal in size. Subcentimeter hypo-densities too small to   characterize but most likely cysts.  BILE DUCTS: Normal in caliber  GALLBLADDER: No intraluminal calcification or pericholecystic fluid.  SPLEEN: Normal in size. Newly evident lateral hyperdense left perisplenic   fluid compatible with hemorrhage.  PANCREAS: Grossly unremarkable, unenhanced morphology.  ADRENALS: No mass.  KIDNEYS/URETERS: No renal or ureteral calcification or hydronephrosis.   Bilateral renal cysts.    BLADDER: Grossly unremarkable.  REPRODUCTIVE ORGANS: Atrophic uterus.    BOWEL: No acute pathology or bowel obstruction. Increase in the size of   lobulated mass contiguous with greater curvature of stomach with newly   evident internal hyperdensity compatible with hemorrhage.  PERITONEUM: Newly evident hyperdense fluid in posterior pelvis compatible   with hemorrhage. No free air.  VESSELS: Tortuosity and atherosclerotic calcification.  RETROPERITONEUM/LYMPH NODES: No significant lymphadenopathy.  ABDOMINAL WALL: No abnormal mass or fluid collection  BONES: Unchanged in appearance.    IMPRESSION:  Increase in size of lobulated mass contiguous with greater curvature of   stomach since 4/5/2024 with newly evident internal hyperdensity   compatible with hemorrhage.    Newly evident hyperdense left lateral perisplenic fluid compatible with   hemorrhage.    Newly evident hyperdense fluid in posterior pelvis compatible with   hemorrhage.        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  npo tonight after midnight for planned surgery  IVFs  Pain control with Morphine 2mg/4mg  EKG : NSR, no ectopy  cardiac consulted  If no cardiac contraindications to surgery patient is medically optimized for the planned procedure with an RCRI Low risk for adverse outcomes/ 0.9% estimated rate of MI< pulm edema, V fib or Cardiac arrest    # HTN,     takes toprol at home  changed to Lopressor IV here with parameters    # HLD   hold statin for now    # hypothyroidism  cont synthroid IV    #Afib, CV# 4  hold eliquis for possible surgery  cont bb, multaq    # RA  on Orencia once per month  Rheum consulted  on chronic steroids  pt to be on stress dose steroids pre and post op:  Hydrocortisone 50 mg on call to the OR and then every 8 hours x 3 doses post op    # vte prophylaxis  hold eliquis for now d/t possible surgery  venodynes     dispo: surgery tomorrow

## 2024-04-25 ENCOUNTER — RESULT REVIEW (OUTPATIENT)
Age: 89
End: 2024-04-25

## 2024-04-25 ENCOUNTER — APPOINTMENT (OUTPATIENT)
Dept: SURGICAL ONCOLOGY | Facility: HOSPITAL | Age: 89
End: 2024-04-25

## 2024-04-25 LAB
ALBUMIN SERPL ELPH-MCNC: 1.9 G/DL — LOW (ref 3.3–5)
ALP SERPL-CCNC: 47 U/L — SIGNIFICANT CHANGE UP (ref 40–120)
ALT FLD-CCNC: 6 U/L — LOW (ref 12–78)
ANION GAP SERPL CALC-SCNC: 5 MMOL/L — SIGNIFICANT CHANGE UP (ref 5–17)
ANION GAP SERPL CALC-SCNC: 6 MMOL/L — SIGNIFICANT CHANGE UP (ref 5–17)
APTT BLD: 29.6 SEC — SIGNIFICANT CHANGE UP (ref 24.5–35.6)
AST SERPL-CCNC: 18 U/L — SIGNIFICANT CHANGE UP (ref 15–37)
BASOPHILS # BLD AUTO: 0 K/UL — SIGNIFICANT CHANGE UP (ref 0–0.2)
BASOPHILS NFR BLD AUTO: 0 % — SIGNIFICANT CHANGE UP (ref 0–2)
BILIRUB SERPL-MCNC: 0.5 MG/DL — SIGNIFICANT CHANGE UP (ref 0.2–1.2)
BUN SERPL-MCNC: 10 MG/DL — SIGNIFICANT CHANGE UP (ref 7–23)
BUN SERPL-MCNC: 8 MG/DL — SIGNIFICANT CHANGE UP (ref 7–23)
CALCIUM SERPL-MCNC: 8.8 MG/DL — SIGNIFICANT CHANGE UP (ref 8.5–10.1)
CALCIUM SERPL-MCNC: 9.1 MG/DL — SIGNIFICANT CHANGE UP (ref 8.5–10.1)
CHLORIDE SERPL-SCNC: 107 MMOL/L — SIGNIFICANT CHANGE UP (ref 96–108)
CHLORIDE SERPL-SCNC: 110 MMOL/L — HIGH (ref 96–108)
CO2 SERPL-SCNC: 21 MMOL/L — LOW (ref 22–31)
CO2 SERPL-SCNC: 23 MMOL/L — SIGNIFICANT CHANGE UP (ref 22–31)
CREAT SERPL-MCNC: 0.67 MG/DL — SIGNIFICANT CHANGE UP (ref 0.5–1.3)
CREAT SERPL-MCNC: 0.73 MG/DL — SIGNIFICANT CHANGE UP (ref 0.5–1.3)
EGFR: 79 ML/MIN/1.73M2 — SIGNIFICANT CHANGE UP
EGFR: 84 ML/MIN/1.73M2 — SIGNIFICANT CHANGE UP
EOSINOPHIL # BLD AUTO: 0 K/UL — SIGNIFICANT CHANGE UP (ref 0–0.5)
EOSINOPHIL NFR BLD AUTO: 0 % — SIGNIFICANT CHANGE UP (ref 0–6)
GLUCOSE SERPL-MCNC: 129 MG/DL — HIGH (ref 70–99)
GLUCOSE SERPL-MCNC: 168 MG/DL — HIGH (ref 70–99)
HCT VFR BLD CALC: 28.8 % — LOW (ref 34.5–45)
HCT VFR BLD CALC: 29.2 % — LOW (ref 34.5–45)
HCT VFR BLD CALC: 29.3 % — LOW (ref 34.5–45)
HGB BLD-MCNC: 9.6 G/DL — LOW (ref 11.5–15.5)
INR BLD: 1.25 RATIO — HIGH (ref 0.85–1.18)
LYMPHOCYTES # BLD AUTO: 1.04 K/UL — SIGNIFICANT CHANGE UP (ref 1–3.3)
LYMPHOCYTES # BLD AUTO: 8 % — LOW (ref 13–44)
MAGNESIUM SERPL-MCNC: 1.4 MG/DL — LOW (ref 1.6–2.6)
MAGNESIUM SERPL-MCNC: 1.5 MG/DL — LOW (ref 1.6–2.6)
MCHC RBC-ENTMCNC: 30.8 PG — SIGNIFICANT CHANGE UP (ref 27–34)
MCHC RBC-ENTMCNC: 31.6 PG — SIGNIFICANT CHANGE UP (ref 27–34)
MCHC RBC-ENTMCNC: 31.6 PG — SIGNIFICANT CHANGE UP (ref 27–34)
MCHC RBC-ENTMCNC: 32.8 GM/DL — SIGNIFICANT CHANGE UP (ref 32–36)
MCHC RBC-ENTMCNC: 32.9 GM/DL — SIGNIFICANT CHANGE UP (ref 32–36)
MCHC RBC-ENTMCNC: 33.3 GM/DL — SIGNIFICANT CHANGE UP (ref 32–36)
MCV RBC AUTO: 93.6 FL — SIGNIFICANT CHANGE UP (ref 80–100)
MCV RBC AUTO: 94.7 FL — SIGNIFICANT CHANGE UP (ref 80–100)
MCV RBC AUTO: 96.4 FL — SIGNIFICANT CHANGE UP (ref 80–100)
MONOCYTES # BLD AUTO: 0.52 K/UL — SIGNIFICANT CHANGE UP (ref 0–0.9)
MONOCYTES NFR BLD AUTO: 4 % — SIGNIFICANT CHANGE UP (ref 2–14)
NEUTROPHILS # BLD AUTO: 11.44 K/UL — HIGH (ref 1.8–7.4)
NEUTROPHILS NFR BLD AUTO: 87 % — HIGH (ref 43–77)
NRBC # BLD: SIGNIFICANT CHANGE UP /100 WBCS (ref 0–0)
PHOSPHATE SERPL-MCNC: 2.4 MG/DL — LOW (ref 2.5–4.5)
PHOSPHATE SERPL-MCNC: 2.5 MG/DL — SIGNIFICANT CHANGE UP (ref 2.5–4.5)
PLATELET # BLD AUTO: 117 K/UL — LOW (ref 150–400)
PLATELET # BLD AUTO: 118 K/UL — LOW (ref 150–400)
PLATELET # BLD AUTO: 123 K/UL — LOW (ref 150–400)
PLATELET # BLD AUTO: 131 K/UL — LOW (ref 150–400)
POTASSIUM SERPL-MCNC: 4.1 MMOL/L — SIGNIFICANT CHANGE UP (ref 3.5–5.3)
POTASSIUM SERPL-MCNC: 4.2 MMOL/L — SIGNIFICANT CHANGE UP (ref 3.5–5.3)
POTASSIUM SERPL-SCNC: 4.1 MMOL/L — SIGNIFICANT CHANGE UP (ref 3.5–5.3)
POTASSIUM SERPL-SCNC: 4.2 MMOL/L — SIGNIFICANT CHANGE UP (ref 3.5–5.3)
PROT SERPL-MCNC: 5.6 GM/DL — LOW (ref 6–8.3)
PROTHROM AB SERPL-ACNC: 14 SEC — HIGH (ref 9.5–13)
RBC # BLD: 3.04 M/UL — LOW (ref 3.8–5.2)
RBC # BLD: 3.04 M/UL — LOW (ref 3.8–5.2)
RBC # BLD: 3.12 M/UL — LOW (ref 3.8–5.2)
RBC # FLD: 12.8 % — SIGNIFICANT CHANGE UP (ref 10.3–14.5)
RBC # FLD: 12.9 % — SIGNIFICANT CHANGE UP (ref 10.3–14.5)
RBC # FLD: 13 % — SIGNIFICANT CHANGE UP (ref 10.3–14.5)
SODIUM SERPL-SCNC: 134 MMOL/L — LOW (ref 135–145)
SODIUM SERPL-SCNC: 138 MMOL/L — SIGNIFICANT CHANGE UP (ref 135–145)
WBC # BLD: 10.73 K/UL — HIGH (ref 3.8–10.5)
WBC # BLD: 10.88 K/UL — HIGH (ref 3.8–10.5)
WBC # BLD: 13 K/UL — HIGH (ref 3.8–10.5)
WBC # FLD AUTO: 10.73 K/UL — HIGH (ref 3.8–10.5)
WBC # FLD AUTO: 10.88 K/UL — HIGH (ref 3.8–10.5)
WBC # FLD AUTO: 13 K/UL — HIGH (ref 3.8–10.5)

## 2024-04-25 PROCEDURE — 99232 SBSQ HOSP IP/OBS MODERATE 35: CPT

## 2024-04-25 PROCEDURE — 88309 TISSUE EXAM BY PATHOLOGIST: CPT | Mod: 26

## 2024-04-25 PROCEDURE — 88341 IMHCHEM/IMCYTCHM EA ADD ANTB: CPT | Mod: 26

## 2024-04-25 PROCEDURE — 43611 EXCISION OF STOMACH LESION: CPT

## 2024-04-25 PROCEDURE — 88342 IMHCHEM/IMCYTCHM 1ST ANTB: CPT | Mod: 26

## 2024-04-25 RX ORDER — LIDOCAINE 4 G/100G
1 CREAM TOPICAL ONCE
Refills: 0 | Status: COMPLETED | OUTPATIENT
Start: 2024-04-25 | End: 2024-04-25

## 2024-04-25 RX ORDER — FENTANYL CITRATE 50 UG/ML
50 INJECTION INTRAVENOUS
Refills: 0 | Status: DISCONTINUED | OUTPATIENT
Start: 2024-04-25 | End: 2024-04-25

## 2024-04-25 RX ORDER — ACETAMINOPHEN 500 MG
1000 TABLET ORAL EVERY 6 HOURS
Refills: 0 | Status: COMPLETED | OUTPATIENT
Start: 2024-04-25 | End: 2024-04-25

## 2024-04-25 RX ORDER — ONDANSETRON 8 MG/1
4 TABLET, FILM COATED ORAL EVERY 6 HOURS
Refills: 0 | Status: DISCONTINUED | OUTPATIENT
Start: 2024-04-25 | End: 2024-04-25

## 2024-04-25 RX ORDER — ACETAMINOPHEN 500 MG
1000 TABLET ORAL EVERY 6 HOURS
Refills: 0 | Status: COMPLETED | OUTPATIENT
Start: 2024-04-25 | End: 2024-04-28

## 2024-04-25 RX ORDER — HYDROMORPHONE HYDROCHLORIDE 2 MG/ML
0.5 INJECTION INTRAMUSCULAR; INTRAVENOUS; SUBCUTANEOUS
Refills: 0 | Status: DISCONTINUED | OUTPATIENT
Start: 2024-04-25 | End: 2024-04-25

## 2024-04-25 RX ORDER — SODIUM CHLORIDE 9 MG/ML
1000 INJECTION, SOLUTION INTRAVENOUS
Refills: 0 | Status: DISCONTINUED | OUTPATIENT
Start: 2024-04-25 | End: 2024-04-25

## 2024-04-25 RX ORDER — MAGNESIUM SULFATE 500 MG/ML
2 VIAL (ML) INJECTION ONCE
Refills: 0 | Status: DISCONTINUED | OUTPATIENT
Start: 2024-04-25 | End: 2024-04-25

## 2024-04-25 RX ORDER — MAGNESIUM SULFATE 500 MG/ML
2 VIAL (ML) INJECTION ONCE
Refills: 0 | Status: COMPLETED | OUTPATIENT
Start: 2024-04-25 | End: 2024-04-25

## 2024-04-25 RX ORDER — HYDROCORTISONE 20 MG
50 TABLET ORAL EVERY 8 HOURS
Refills: 0 | Status: COMPLETED | OUTPATIENT
Start: 2024-04-25 | End: 2024-04-26

## 2024-04-25 RX ADMIN — HYDROMORPHONE HYDROCHLORIDE 0.5 MILLIGRAM(S): 2 INJECTION INTRAMUSCULAR; INTRAVENOUS; SUBCUTANEOUS at 00:13

## 2024-04-25 RX ADMIN — DRONEDARONE 400 MILLIGRAM(S): 400 TABLET, FILM COATED ORAL at 09:34

## 2024-04-25 RX ADMIN — Medication 1 PACKET(S): at 03:25

## 2024-04-25 RX ADMIN — Medication 100 MILLIGRAM(S): at 09:34

## 2024-04-25 RX ADMIN — PANTOPRAZOLE SODIUM 40 MILLIGRAM(S): 20 TABLET, DELAYED RELEASE ORAL at 22:02

## 2024-04-25 RX ADMIN — PANTOPRAZOLE SODIUM 40 MILLIGRAM(S): 20 TABLET, DELAYED RELEASE ORAL at 09:34

## 2024-04-25 RX ADMIN — Medication 2 GRAM(S): at 17:44

## 2024-04-25 RX ADMIN — Medication 400 MILLIGRAM(S): at 03:46

## 2024-04-25 RX ADMIN — MORPHINE SULFATE 2 MILLIGRAM(S): 50 CAPSULE, EXTENDED RELEASE ORAL at 08:05

## 2024-04-25 RX ADMIN — MORPHINE SULFATE 2 MILLIGRAM(S): 50 CAPSULE, EXTENDED RELEASE ORAL at 03:14

## 2024-04-25 RX ADMIN — LIDOCAINE 1 PATCH: 4 CREAM TOPICAL at 03:25

## 2024-04-25 RX ADMIN — Medication 1000 MILLIGRAM(S): at 04:16

## 2024-04-25 RX ADMIN — Medication 50 MILLIGRAM(S): at 10:17

## 2024-04-25 RX ADMIN — DRONEDARONE 400 MILLIGRAM(S): 400 TABLET, FILM COATED ORAL at 22:43

## 2024-04-25 RX ADMIN — MORPHINE SULFATE 2 MILLIGRAM(S): 50 CAPSULE, EXTENDED RELEASE ORAL at 03:44

## 2024-04-25 RX ADMIN — ATORVASTATIN CALCIUM 20 MILLIGRAM(S): 80 TABLET, FILM COATED ORAL at 22:02

## 2024-04-25 RX ADMIN — Medication 50 MILLIGRAM(S): at 22:01

## 2024-04-25 RX ADMIN — MORPHINE SULFATE 2 MILLIGRAM(S): 50 CAPSULE, EXTENDED RELEASE ORAL at 20:55

## 2024-04-25 RX ADMIN — MORPHINE SULFATE 2 MILLIGRAM(S): 50 CAPSULE, EXTENDED RELEASE ORAL at 21:25

## 2024-04-25 RX ADMIN — MORPHINE SULFATE 2 MILLIGRAM(S): 50 CAPSULE, EXTENDED RELEASE ORAL at 08:10

## 2024-04-25 NOTE — BRIEF OPERATIVE NOTE - NSICDXBRIEFPROCEDURE_GEN_ALL_CORE_FT
Continue prior to admission Ativan PROCEDURES:  Resection of gastrointestinal stromal tumor (GIST) 25-Apr-2024 15:21:37  Brigette Keita  Block, transversus abdominis plane, bilateral 25-Apr-2024 15:21:44  Brigette Keita

## 2024-04-25 NOTE — BRIEF OPERATIVE NOTE - NSICDXBRIEFPOSTOP_GEN_ALL_CORE_FT
POST-OP DIAGNOSIS:  Gastrointestinal stromal tumor (GIST) of stomach 25-Apr-2024 15:22:06  Brigette Keita

## 2024-04-25 NOTE — PROVIDER CONTACT NOTE (CHANGE IN STATUS NOTIFICATION) - BACKGROUND
admitted for abd pain/bladder mass. tachy low 100's. 3L o2 new (this admission)
bladder mass /bleeding tumor.

## 2024-04-25 NOTE — PROGRESS NOTE ADULT - ASSESSMENT
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  hemodynamically stable  s/p EGD no mass seen on EGD    Regular Diet  IVF  hold all AC  resume other  home meds IV conversion  cards consult   hospitalist consult appreciated  serial abdominal exams  trend h/h  hold DVT ppx  Protonix  plan for OR on Thursday for resection of bleeding tumor    Plan discussed with Dr. Martines

## 2024-04-25 NOTE — PROVIDER CONTACT NOTE (CHANGE IN STATUS NOTIFICATION) - ACTION/TREATMENT ORDERED:
little relief after morphine, 0.5 mg dilaudid given, +effect. Tylenol, morphine for pain.  EKG,  Trop, CBC, BMP, mag, phos. ordered. turned and repositioned. FREDY Stokes assessed at bedside.    relief noted
MD will assess patient at beside

## 2024-04-25 NOTE — PROGRESS NOTE ADULT - ATTENDING COMMENTS
89F with multiple medical comorbidities has been optimized for partial gastrectomy today to treat a bleeding gastric GIST. She remains in a lot of pain but is hemodynamically stable. Given the amount of pain that she is in and the moderate but acceptable risk associated with the surgery I have recommended partial gastrectomy. I have spoken to the patient and her sons about the risks and benefits of the surgery. She also consented to receive blood if needed.

## 2024-04-25 NOTE — PROVIDER CONTACT NOTE (CHANGE IN STATUS NOTIFICATION) - SITUATION
Pt woke up in 8/10 pain in thorasic spine with SOB. repositioned and elevated HOB no relief.
pt c/o pain radiating from neck, chest and left arm. 8/10 pain and SOB.

## 2024-04-25 NOTE — PROGRESS NOTE ADULT - ASSESSMENT
88 yo female with pmh: DDD, HTN, HLD, hypothyroidism, afib on eliquis ( last dose yesterday am), has loop recorder, JR on Orencia, OP, who came to the ER with abd pain on 4/5, ct scan at that time reported gastric mass, was discharged instructed to f/u with GI doc, Dr Vazquez, however per pt GI doc [place] one day a week and was unable to get an appt until May 5th.  Yesterday the pain in her stomach was severe, and she return to the ER. 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 Afib  - CV of 4  - Continue multaq  - Continue lopressor  - Hold AC    # RA  - on Orencia once per month  - Rheum consult appreciated  - on chronic steroids  - Stress dose steroids recommend:  Hydrocortisone 50 mg on call to the OR and then every 8 hours x 3 doses post op    # DVT prophylaxis  - SCDs

## 2024-04-25 NOTE — BRIEF OPERATIVE NOTE - NSICDXBRIEFPREOP_GEN_ALL_CORE_FT
PRE-OP DIAGNOSIS:  Gastrointestinal stromal tumor (GIST) of stomach 25-Apr-2024 15:21:58  Brigette Keita

## 2024-04-25 NOTE — PROGRESS NOTE ADULT - SUBJECTIVE AND OBJECTIVE BOX
HOSPITALIST ATTENDING PROGRESS NOTE    Chart and meds reviewed.  Patient seen and examined.    CC: GIB    Subjective: No acute issues overnight    All other systems reviewed and found to be negative with the exception of what has been described above.    MEDICATIONS  (STANDING):  atorvastatin 20 milliGRAM(s) Oral at bedtime  dronedarone 400 milliGRAM(s) Oral two times a day  hydrocortisone sodium succinate Injectable 50 milliGRAM(s) IV Push every 8 hours  levothyroxine 88 MICROGram(s) Oral daily  magnesium sulfate  IVPB 2 Gram(s) IV Intermittent once  metoprolol succinate  milliGRAM(s) Oral daily  pantoprazole  Injectable 40 milliGRAM(s) IV Push two times a day  sodium chloride 0.9%. 1000 milliLiter(s) (75 mL/Hr) IV Continuous <Continuous>  sodium phosphate 30 milliMole(s)/500 mL IVPB 30 milliMole(s) IV Intermittent once    MEDICATIONS  (PRN):  acetaminophen     Tablet .. 650 milliGRAM(s) Oral every 6 hours PRN Temp greater or equal to 38C (100.4F)  acetaminophen   IVPB .. 1000 milliGRAM(s) IV Intermittent every 6 hours PRN Mild Pain (1 - 3)  morphine  - Injectable 2 milliGRAM(s) IV Push every 4 hours PRN Severe Pain (7 - 10)  ondansetron Injectable 4 milliGRAM(s) IV Push every 6 hours PRN Nausea      Vital Signs Last 24 Hrs  T(C): 36.7 (25 Apr 2024 08:43), Max: 38 (24 Apr 2024 17:43)  T(F): 98.1 (25 Apr 2024 08:43), Max: 100.4 (24 Apr 2024 17:43)  HR: 89 (25 Apr 2024 08:43) (80 - 106)  BP: 147/69 (25 Apr 2024 08:43) (116/50 - 162/86)  RR: 18 (25 Apr 2024 08:43) (17 - 21)  SpO2: 96% (25 Apr 2024 08:43) (94% - 96%)    GEN: NAD  HEENT:  pupils equal and reactive, EOMI, no oropharyngeal lesions, erythema, exudates, oral thrush  NECK:   supple, no carotid bruits  CV:  +S1, +S2, regular, no murmurs  RESP:   lungs clear to auscultation bilaterally, no wheezing, rales, rhonchi, good air entry bilaterally  GI:  abdomen soft, non-tender, non-distended, normal BS  EXT:  no clubbing, no cyanosis, no edema, no calf pain, swelling or erythema  NEURO:  AAOX3, no focal neurological deficits, follows all commands, able to move extremities spontaneously  SKIN:  no ulcers, lesions or rashes    LABS:                          9.6    13.00 )-----------( 117      ( 25 Apr 2024 05:28 )             29.3     04-25    138  |  110<H>  |  10  ----------------------------<  129<H>  4.2   |  23  |  0.73    Ca    9.1      25 Apr 2024 05:28  Phos  2.4     04-25  Mg     1.5     04-25    TPro  5.6<L>  /  Alb  1.9<L>  /  TBili  0.5  /  DBili  x   /  AST  18  /  ALT  6<L>  /  AlkPhos  47  04-25      LIVER FUNCTIONS - ( 25 Apr 2024 05:28 )  Alb: 1.9 g/dL / Pro: 5.6 gm/dL / ALK PHOS: 47 U/L / ALT: 6 U/L / AST: 18 U/L / GGT: x           PT/INR - ( 25 Apr 2024 05:28 )   PT: 14.0 sec;   INR: 1.25 ratio    PTT - ( 25 Apr 2024 05:28 )  PTT:29.6 sec    Urinalysis Basic - ( 25 Apr 2024 05:28 )  Color: x / Appearance: x / SG: x / pH: x  Gluc: 129 mg/dL / Ketone: x  / Bili: x / Urobili: x   Blood: x / Protein: x / Nitrite: x   Leuk Esterase: x / RBC: x / WBC x   Sq Epi: x / Non Sq Epi: x / Bacteria: x      < from: CT Abdomen and Pelvis No Cont (04.22.24 @ 17:52) >  IMPRESSION:  Increase in size of lobulated mass contiguous with greater curvature of   stomach since 4/5/2024 with newly evident internal hyperdensity   compatible with hemorrhage.    Newly evident hyperdense left lateral perisplenic fluid compatible with   hemorrhage.    Newly evident hyperdense fluid in posterior pelvis compatible with   hemorrhage.    --- End of Report ---    < end of copied text >

## 2024-04-25 NOTE — CHART NOTE - NSCHARTNOTEFT_GEN_A_CORE
overnight, patient was complaining of chest pain, left sided abd pain with shortness of breath. Improved after pain medication and nasal cannula. Patient was evaluated and endorses that pain is at epigastric area and left subcostal region that radiates to back. Denies numbness/tingling sensation.    Upon abd exam, tenderness is at left sided abdomen, consistent with location of tumor. Troponin and EKG normal. Will observe at this time.

## 2024-04-25 NOTE — PROGRESS NOTE ADULT - SUBJECTIVE AND OBJECTIVE BOX
Pt seen and examined at bedside, no acute events. Pt complaining of abdominal pain and chest pain. Denied fever, chills, nausea, vomiting or SOB overnight.     Vital Signs Last 24 Hrs  T(C): 36.6 (25 Apr 2024 05:13), Max: 38 (24 Apr 2024 17:43)  T(F): 97.9 (25 Apr 2024 05:13), Max: 100.4 (24 Apr 2024 17:43)  HR: 80 (25 Apr 2024 05:13) (80 - 106)  BP: 124/73 (25 Apr 2024 05:13) (114/53 - 162/86)  BP(mean): --  RR: 17 (25 Apr 2024 05:13) (16 - 21)  SpO2: 95% (25 Apr 2024 05:13) (94% - 96%)    Parameters below as of 25 Apr 2024 05:13  Patient On (Oxygen Delivery Method): nasal cannula  O2 Flow (L/min): 3    MEDICATIONS  (STANDING):  atorvastatin 20 milliGRAM(s) Oral at bedtime  dronedarone 400 milliGRAM(s) Oral two times a day  hydrocortisone sodium succinate Injectable 50 milliGRAM(s) IV Push once  hydrocortisone sodium succinate Injectable 50 milliGRAM(s) IV Push every 8 hours  levothyroxine 88 MICROGram(s) Oral daily  metoprolol succinate  milliGRAM(s) Oral daily  pantoprazole  Injectable 40 milliGRAM(s) IV Push two times a day  sodium chloride 0.9%. 1000 milliLiter(s) (75 mL/Hr) IV Continuous <Continuous>    MEDICATIONS  (PRN):  acetaminophen     Tablet .. 650 milliGRAM(s) Oral every 6 hours PRN Temp greater or equal to 38C (100.4F)  acetaminophen   IVPB .. 1000 milliGRAM(s) IV Intermittent every 6 hours PRN Mild Pain (1 - 3)  morphine  - Injectable 2 milliGRAM(s) IV Push every 4 hours PRN Severe Pain (7 - 10)  ondansetron Injectable 4 milliGRAM(s) IV Push every 6 hours PRN Nausea                          9.6    13.00 )-----------( 117      ( 25 Apr 2024 05:28 )             29.3     04-25    138  |  110<H>  |  10  ----------------------------<  129<H>  4.2   |  23  |  0.73    Ca    9.1      25 Apr 2024 05:28  Phos  2.4     04-25  Mg     1.5     04-25    TPro  5.6<L>  /  Alb  1.9<L>  /  TBili  0.5  /  DBili  x   /  AST  18  /  ALT  6<L>  /  AlkPhos  47  04-25    I&O's Summary    24 Apr 2024 07:01  -  25 Apr 2024 07:00  --------------------------------------------------------  IN: 0 mL / OUT: 300 mL / NET: -300 mL        Culture - Urine (collected 22 Apr 2024 19:53)  Source: Clean Catch Clean Catch (Midstream)  Final Report (23 Apr 2024 22:41):    <10,000 CFU/mL Normal Urogenital Kika          Physical Exam:  Pt is AAOx3  General: Well developed, in no acute distress.   Chest: Lungs clear, no rales, no rhonchi, no wheezes.   Heart: RR, no murmurs, no rubs, no gallops.   Abdomen: Soft, diffuse tenderness, mildly distended, no peritonitis, no masses, BS normal.    Back: Normal curvature, no tenderness.   Neuro: Physiological, no localizing findings.   Skin: Normal, no rashes, no lesions noted.   Extremities: Warm, well perfused, no edema, Pulses intact

## 2024-04-25 NOTE — CONSULT NOTE ADULT - ASSESSMENT
A:    89yFemale    Here for:    1. Gastric mass s/p rxn  2. HTN  3. HLD  4. PAF  5. RA  6. HypoMa+  7. Hypophos    This patient requires a moderate level of care due to the complexity and severity of their condition which increases the amount and complexity of data to be reviewed and analyzed in addition to the risk of complications, morbidity and mortality of patient management    P:    POD # 0 s/p lap rxn gastric exophytic bleeding tumor, suspect GIST, pending path  Input 2500  EBL 50  UOP ~ 500    Awake in PACU; recovering from anesthesia but awake and alert  Abdomen looks well post op    Suspicion mass is a GIST; pending pathology    PRN analgesia  LR @ 75cc/hr  NPO for now  Continue multaq, statin, synthroid, toprol; HD monitoring  VTE ppx SCD only post op; discussed with surgical team, concern for post op bleeding; monitor Hgb O/N, no chemical ppx for now  Hx of PAF on eliquis at home; hold for now, coags in AM  On prednisone 5mg PO qd at home for RA; post op stress dose steroids hydrocortisone 50mg IV TID  f/u UOP, has been brisk throughout and since case, nugent in place, maintain tonight  Mg 1.4, 2g IV ordered  Trend Hgb  AM labs, replete lytes PRN  BG < 180  OOB, IS encouraged    Dispo: XF to SICU post op. Discussed with Dr Martines surgery and Dr Baldwin from ICU.    Discussed plan of care and disposition with Pt, all questions answered    Time spent on this patient encounter: 55+ minutes    Date of entry of this note is equal to the date of services rendered    This includes assessment of the presenting problems with associated risks, reviewing the meeical record to prepare for the encounter and meeting face to face with the patient to obtain additional history. I have also performed an appropiate physical exam, made interventions listed and ordered and interpreted appropiate diagnostic studies as documented. This also included communication and coordination of care with the multidisciplinary team including the bedside nurse, appropriate attending of record and consultants as needed.

## 2024-04-25 NOTE — CONSULT NOTE ADULT - SUBJECTIVE AND OBJECTIVE BOX
ICU/SDU Consult Note    HPI:    S:    Pt seen and examined  89 yo female with pmh: DDD, HTN, HLD, hypothyroidism, afib on eliquis ( last dose yesterday am), has loop recorder, RA on Orencia, OP, who came to the ER with abd pain on 4/5, ct scan at that time reported gastric mass, was discharged instructed to f/u with GI doc, Dr Vazquez, however per pt GI doc is [place] office one day a week and was unable to get an appt until May 5th.  Yesterday the pain in her stomach was severe, and she return to the ER. 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 Seen in PACU. Extubated.    ROS: + post operative pain  remainder of systems reviewed, negative      Allergies    contrast media (iodine-based) (Unknown)  penicillins (Unknown)  sulfa drugs (Unknown)    Intolerances        MEDICATIONS  (STANDING):  atorvastatin 20 milliGRAM(s) Oral at bedtime  dronedarone 400 milliGRAM(s) Oral two times a day  hydrocortisone sodium succinate Injectable 50 milliGRAM(s) IV Push every 8 hours  lactated ringers. 1000 milliLiter(s) (75 mL/Hr) IV Continuous <Continuous>  levothyroxine 88 MICROGram(s) Oral daily  metoprolol succinate  milliGRAM(s) Oral daily  pantoprazole  Injectable 40 milliGRAM(s) IV Push two times a day  sodium chloride 0.9%. 1000 milliLiter(s) (75 mL/Hr) IV Continuous <Continuous>  sodium phosphate 30 milliMole(s)/500 mL IVPB 30 milliMole(s) IV Intermittent once    MEDICATIONS  (PRN):  acetaminophen     Tablet .. 650 milliGRAM(s) Oral every 6 hours PRN Temp greater or equal to 38C (100.4F)  acetaminophen   IVPB .. 1000 milliGRAM(s) IV Intermittent every 6 hours PRN Mild Pain (1 - 3)  fentaNYL    Injectable 50 MICROGram(s) IV Push every 5 minutes PRN Severe Pain (7 - 10)  HYDROmorphone  Injectable 0.5 milliGRAM(s) IV Push every 10 minutes PRN Moderate Pain (4 - 6)  morphine  - Injectable 2 milliGRAM(s) IV Push every 4 hours PRN Severe Pain (7 - 10)  ondansetron Injectable 4 milliGRAM(s) IV Push every 6 hours PRN Nausea  ondansetron Injectable 4 milliGRAM(s) IV Push every 6 hours PRN Nausea and/or Vomiting      Drug Dosing Weight  Height (cm): 162.6 (22 Apr 2024 13:52)  Weight (kg): 71.7 (22 Apr 2024 13:52)  BMI (kg/m2): 27.1 (22 Apr 2024 13:52)  BSA (m2): 1.77 (22 Apr 2024 13:52)    PAST MEDICAL & SURGICAL HISTORY:  Rheumatoid aortitis  rheumatoid arthritis      History of appendectomy      Age-related incipient cataract of both eyes  surgery completed          FAMILY HISTORY:          O:    ICU Vital Signs Last 24 Hrs  T(C): 36.3 (25 Apr 2024 15:09), Max: 37.6 (24 Apr 2024 22:38)  T(F): 97.4 (25 Apr 2024 15:09), Max: 99.7 (24 Apr 2024 22:38)  HR: 63 (25 Apr 2024 17:00) (63 - 106)  BP: 136/82 (25 Apr 2024 15:09) (124/73 - 162/86)  BP(mean): --  ABP: 121/65 (25 Apr 2024 17:00) (120/61 - 140/63)  ABP(mean): --  RR: 15 (25 Apr 2024 17:00) (15 - 21)  SpO2: 95% (25 Apr 2024 17:00) (92% - 96%)    O2 Parameters below as of 25 Apr 2024 17:00  Patient On (Oxygen Delivery Method): nasal cannula  O2 Flow (L/min): 2              I&O's Detail    24 Apr 2024 07:01  -  25 Apr 2024 07:00  --------------------------------------------------------  IN:  Total IN: 0 mL    OUT:    Voided (mL): 300 mL  Total OUT: 300 mL    Total NET: -300 mL      25 Apr 2024 07:01  -  25 Apr 2024 17:53  --------------------------------------------------------  IN:    Other (mL): 2200 mL  Total IN: 2200 mL    OUT:    Indwelling Catheter - Urethral (mL): 50 mL    Other (mL): 150 mL  Total OUT: 200 mL    Total NET: 2000 mL              PE:    Adult lying in bed  Elderly F  No JVD trachea midline  Normocephalic, atraumatic  S1S2+  CTA B/L  Abd soft NTND; + lap wounds, midline incision dressed, C/D/I  No leg swelling/edema noted  Awake and alert  Skin pink, warm    LABS:    CBC Full  -  ( 25 Apr 2024 15:35 )  WBC Count : 10.88 K/uL  RBC Count : 3.04 M/uL  Hemoglobin : 9.6 g/dL  Hematocrit : 28.8 %  Platelet Count - Automated : 123 K/uL  Mean Cell Volume : 94.7 fl  Mean Cell Hemoglobin : 31.6 pg  Mean Cell Hemoglobin Concentration : 33.3 gm/dL  Auto Neutrophil # : x  Auto Lymphocyte # : x  Auto Monocyte # : x  Auto Eosinophil # : x  Auto Basophil # : x  Auto Neutrophil % : x  Auto Lymphocyte % : x  Auto Monocyte % : x  Auto Eosinophil % : x  Auto Basophil % : x    04-25    134<L>  |  107  |  8   ----------------------------<  168<H>  4.1   |  21<L>  |  0.67    Ca    8.8      25 Apr 2024 15:35  Phos  2.5     04-25  Mg     1.4     04-25    TPro  5.6<L>  /  Alb  1.9<L>  /  TBili  0.5  /  DBili  x   /  AST  18  /  ALT  6<L>  /  AlkPhos  47  04-25    PT/INR - ( 25 Apr 2024 05:28 )   PT: 14.0 sec;   INR: 1.25 ratio         PTT - ( 25 Apr 2024 05:28 )  PTT:29.6 sec  Urinalysis Basic - ( 25 Apr 2024 15:35 )    Color: x / Appearance: x / SG: x / pH: x  Gluc: 168 mg/dL / Ketone: x  / Bili: x / Urobili: x   Blood: x / Protein: x / Nitrite: x   Leuk Esterase: x / RBC: x / WBC x   Sq Epi: x / Non Sq Epi: x / Bacteria: x      CAPILLARY BLOOD GLUCOSE            LIVER FUNCTIONS - ( 25 Apr 2024 05:28 )  Alb: 1.9 g/dL / Pro: 5.6 gm/dL / ALK PHOS: 47 U/L / ALT: 6 U/L / AST: 18 U/L / GGT: x

## 2024-04-26 LAB
ANION GAP SERPL CALC-SCNC: 5 MMOL/L — SIGNIFICANT CHANGE UP (ref 5–17)
APTT BLD: 28.1 SEC — SIGNIFICANT CHANGE UP (ref 24.5–35.6)
BUN SERPL-MCNC: 10 MG/DL — SIGNIFICANT CHANGE UP (ref 7–23)
CALCIUM SERPL-MCNC: 9.1 MG/DL — SIGNIFICANT CHANGE UP (ref 8.5–10.1)
CHLORIDE SERPL-SCNC: 108 MMOL/L — SIGNIFICANT CHANGE UP (ref 96–108)
CO2 SERPL-SCNC: 24 MMOL/L — SIGNIFICANT CHANGE UP (ref 22–31)
CREAT SERPL-MCNC: 0.64 MG/DL — SIGNIFICANT CHANGE UP (ref 0.5–1.3)
EGFR: 85 ML/MIN/1.73M2 — SIGNIFICANT CHANGE UP
GLUCOSE SERPL-MCNC: 137 MG/DL — HIGH (ref 70–99)
HCT VFR BLD CALC: 28.7 % — LOW (ref 34.5–45)
HGB BLD-MCNC: 9.9 G/DL — LOW (ref 11.5–15.5)
INR BLD: 1.16 RATIO — SIGNIFICANT CHANGE UP (ref 0.85–1.18)
MAGNESIUM SERPL-MCNC: 2 MG/DL — SIGNIFICANT CHANGE UP (ref 1.6–2.6)
MCHC RBC-ENTMCNC: 32 PG — SIGNIFICANT CHANGE UP (ref 27–34)
MCHC RBC-ENTMCNC: 34.5 GM/DL — SIGNIFICANT CHANGE UP (ref 32–36)
MCV RBC AUTO: 92.9 FL — SIGNIFICANT CHANGE UP (ref 80–100)
PHOSPHATE SERPL-MCNC: 2.3 MG/DL — LOW (ref 2.5–4.5)
PLATELET # BLD AUTO: 160 K/UL — SIGNIFICANT CHANGE UP (ref 150–400)
POTASSIUM SERPL-MCNC: 3.9 MMOL/L — SIGNIFICANT CHANGE UP (ref 3.5–5.3)
POTASSIUM SERPL-SCNC: 3.9 MMOL/L — SIGNIFICANT CHANGE UP (ref 3.5–5.3)
PROTHROM AB SERPL-ACNC: 13 SEC — SIGNIFICANT CHANGE UP (ref 9.5–13)
RBC # BLD: 3.09 M/UL — LOW (ref 3.8–5.2)
RBC # FLD: 13 % — SIGNIFICANT CHANGE UP (ref 10.3–14.5)
SODIUM SERPL-SCNC: 137 MMOL/L — SIGNIFICANT CHANGE UP (ref 135–145)
WBC # BLD: 14.03 K/UL — HIGH (ref 3.8–10.5)
WBC # FLD AUTO: 14.03 K/UL — HIGH (ref 3.8–10.5)

## 2024-04-26 PROCEDURE — 99232 SBSQ HOSP IP/OBS MODERATE 35: CPT

## 2024-04-26 RX ORDER — POTASSIUM PHOSPHATE, MONOBASIC POTASSIUM PHOSPHATE, DIBASIC 236; 224 MG/ML; MG/ML
30 INJECTION, SOLUTION INTRAVENOUS ONCE
Refills: 0 | Status: COMPLETED | OUTPATIENT
Start: 2024-04-26 | End: 2024-04-26

## 2024-04-26 RX ORDER — HEPARIN SODIUM 5000 [USP'U]/ML
5000 INJECTION INTRAVENOUS; SUBCUTANEOUS EVERY 8 HOURS
Refills: 0 | Status: DISCONTINUED | OUTPATIENT
Start: 2024-04-26 | End: 2024-04-30

## 2024-04-26 RX ADMIN — ATORVASTATIN CALCIUM 20 MILLIGRAM(S): 80 TABLET, FILM COATED ORAL at 21:42

## 2024-04-26 RX ADMIN — SODIUM CHLORIDE 75 MILLILITER(S): 9 INJECTION INTRAMUSCULAR; INTRAVENOUS; SUBCUTANEOUS at 10:25

## 2024-04-26 RX ADMIN — DRONEDARONE 400 MILLIGRAM(S): 400 TABLET, FILM COATED ORAL at 10:24

## 2024-04-26 RX ADMIN — HEPARIN SODIUM 5000 UNIT(S): 5000 INJECTION INTRAVENOUS; SUBCUTANEOUS at 21:42

## 2024-04-26 RX ADMIN — Medication 100 MILLIGRAM(S): at 10:24

## 2024-04-26 RX ADMIN — PANTOPRAZOLE SODIUM 40 MILLIGRAM(S): 20 TABLET, DELAYED RELEASE ORAL at 10:24

## 2024-04-26 RX ADMIN — POTASSIUM PHOSPHATE, MONOBASIC POTASSIUM PHOSPHATE, DIBASIC 83.33 MILLIMOLE(S): 236; 224 INJECTION, SOLUTION INTRAVENOUS at 11:11

## 2024-04-26 RX ADMIN — Medication 88 MICROGRAM(S): at 05:09

## 2024-04-26 RX ADMIN — PANTOPRAZOLE SODIUM 40 MILLIGRAM(S): 20 TABLET, DELAYED RELEASE ORAL at 21:42

## 2024-04-26 RX ADMIN — Medication 50 MILLIGRAM(S): at 05:09

## 2024-04-26 RX ADMIN — Medication 50 MILLIGRAM(S): at 14:41

## 2024-04-26 NOTE — PROGRESS NOTE ADULT - GIT GEN HX ROS MEA POS PC
Bedside and Verbal shift change report given to [address] [place] [address], [id number], RN (oncoming nurse) by Rocael Cantu RN (offgoing nurse). Report included the following information SBAR, Kardex, Intake/Output, MAR and Recent Results. abdominal pain Cheiloplasty (Complex) Text: A decision was made to reconstruct the defect with a  cheiloplasty.  The defect was undermined extensively.  Additional obicularis oris muscle was excised with a 15 blade scalpel.  The defect was converted into a full thickness wedge to facilite a better cosmetic result.  Small vessels were then tied off with 5-0 monocyrl. The obicularis oris, superficial fascia, adipose and dermis were then reapproximated.  After the deeper layers were approximated the epidermis was reapproximated with particular care given to realign the vermilion border.

## 2024-04-26 NOTE — PROGRESS NOTE ADULT - ASSESSMENT
A:    89yFemale    Here for:    1. Gastric mass s/p rxn  2. HTN  3. HLD  4. PAF  5. RA  6. HypoMa+  7. 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

## 2024-04-26 NOTE — PROGRESS NOTE ADULT - SUBJECTIVE AND OBJECTIVE BOX
Pt seen and examined at bedside, no acute events. Sleeping comfortably. Denied fever, chills, nausea, vomiting or SOB overnight.     Vital Signs Last 24 Hrs  T(C): 36.9 (25 Apr 2024 20:46), Max: 36.9 (25 Apr 2024 20:46)  T(F): 98.4 (25 Apr 2024 20:46), Max: 98.4 (25 Apr 2024 20:46)  HR: 72 (26 Apr 2024 04:00) (60 - 89)  BP: 155/76 (26 Apr 2024 04:00) (120/64 - 163/62)  BP(mean): 99 (26 Apr 2024 04:00) (80 - 99)  RR: 26 (26 Apr 2024 04:00) (14 - 26)  SpO2: 93% (26 Apr 2024 04:00) (91% - 96%)    Parameters below as of 25 Apr 2024 20:00  Patient On (Oxygen Delivery Method): nasal cannula  O2 Flow (L/min): 2    MEDICATIONS  (STANDING):  atorvastatin 20 milliGRAM(s) Oral at bedtime  dronedarone 400 milliGRAM(s) Oral two times a day  hydrocortisone sodium succinate Injectable 50 milliGRAM(s) IV Push every 8 hours  levothyroxine 88 MICROGram(s) Oral daily  metoprolol succinate  milliGRAM(s) Oral daily  pantoprazole  Injectable 40 milliGRAM(s) IV Push two times a day  sodium chloride 0.9%. 1000 milliLiter(s) (75 mL/Hr) IV Continuous <Continuous>    MEDICATIONS  (PRN):  acetaminophen     Tablet .. 650 milliGRAM(s) Oral every 6 hours PRN Temp greater or equal to 38C (100.4F)  acetaminophen   IVPB .. 1000 milliGRAM(s) IV Intermittent every 6 hours PRN Mild Pain (1 - 3)  acetaminophen   IVPB .. 1000 milliGRAM(s) IV Intermittent every 6 hours PRN Mild Pain (1 - 3)  morphine  - Injectable 2 milliGRAM(s) IV Push every 4 hours PRN Severe Pain (7 - 10)  ondansetron Injectable 4 milliGRAM(s) IV Push every 6 hours PRN Nausea                          9.6    10.73 )-----------( 131      ( 25 Apr 2024 22:48 )             29.2     04-25    134<L>  |  107  |  8   ----------------------------<  168<H>  4.1   |  21<L>  |  0.67    Ca    8.8      25 Apr 2024 15:35  Phos  2.5     04-25  Mg     1.4     04-25    TPro  5.6<L>  /  Alb  1.9<L>  /  TBili  0.5  /  DBili  x   /  AST  18  /  ALT  6<L>  /  AlkPhos  47  04-25    I&O's Summary    24 Apr 2024 07:01  -  25 Apr 2024 07:00  --------------------------------------------------------  IN: 0 mL / OUT: 300 mL / NET: -300 mL    25 Apr 2024 07:01  -  26 Apr 2024 04:58  --------------------------------------------------------  IN: 2450 mL / OUT: 450 mL / NET: 2000 mL          Physical Exam:  Pt is AAOx3  General: Well developed, in no acute distress.   Chest: Lungs clear, no rales, no rhonchi, no wheezes.   Heart: RR, no murmurs, no rubs, no gallops.   Abdomen: Soft, mildly tender, nondistended, surgical sites c/d/i  Back: Normal curvature, no tenderness.   Neuro: Physiological, no localizing findings.   Skin: Normal, no rashes, no lesions noted.   Extremities: Warm, well perfused, no edema, Pulses intact

## 2024-04-26 NOTE — PROGRESS NOTE ADULT - ASSESSMENT
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 Martines

## 2024-04-26 NOTE — PROGRESS NOTE ADULT - SUBJECTIVE AND OBJECTIVE BOX
HPI:  89 yo female with pmh: DDD, HTN, HLD, hypothyroidism, afib on eliquis ( last dose yesterday am), has loop recorder, RA on Orencia, OP, who came to the ER with abd pain on 4/5, ct scan at that time reported gastric mass, was discharged instructed to f/u with GI doc, Dr Vazquez, however per pt GI doc is [place] office one day a week and was unable to get an appt until May 5th.  Yesterday the pain in her stomach was severe, and she return to the ER. 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  4/26: H & H Stable, no events over night       PAST MEDICAL & SURGICAL HISTORY:  Rheumatoid aortitis  rheumatoid arthritis      History of appendectomy      Age-related incipient cataract of both eyes  surgery completed          FAMILY HISTORY:      Social Hx:    Allergies    contrast media (iodine-based) (Unknown)  penicillins (Unknown)  sulfa drugs (Unknown)    Intolerances            ICU Vital Signs Last 24 Hrs  T(C): 36.7 (26 Apr 2024 13:27), Max: 36.9 (25 Apr 2024 20:46)  T(F): 98.1 (26 Apr 2024 13:27), Max: 98.4 (25 Apr 2024 20:46)  HR: 71 (26 Apr 2024 12:00) (60 - 86)  BP: 91/77 (26 Apr 2024 12:00) (91/77 - 163/62)  BP(mean): 84 (26 Apr 2024 12:00) (80 - 99)  ABP: 149/67 (26 Apr 2024 12:00) (103/48 - 158/67)  ABP(mean): 97 (26 Apr 2024 12:00) (70 - 103)  RR: 34 (26 Apr 2024 12:00) (14 - 34)  SpO2: 94% (26 Apr 2024 12:00) (91% - 96%)    O2 Parameters below as of 26 Apr 2024 12:00  Patient On (Oxygen Delivery Method): nasal cannula  O2 Flow (L/min): 4              I&O's Summary    25 Apr 2024 07:01  -  26 Apr 2024 07:00  --------------------------------------------------------  IN: 3200 mL / OUT: 900 mL / NET: 2300 mL                              9.9    14.03 )-----------( 160      ( 26 Apr 2024 05:25 )             28.7       04-26    137  |  108  |  10  ----------------------------<  137<H>  3.9   |  24  |  0.64    Ca    9.1      26 Apr 2024 05:25  Phos  2.3     04-26  Mg     2.0     04-26    TPro  5.6<L>  /  Alb  1.9<L>  /  TBili  0.5  /  DBili  x   /  AST  18  /  ALT  6<L>  /  AlkPhos  47  04-25                Urinalysis Basic - ( 26 Apr 2024 05:25 )    Color: x / Appearance: x / SG: x / pH: x  Gluc: 137 mg/dL / Ketone: x  / Bili: x / Urobili: x   Blood: x / Protein: x / Nitrite: x   Leuk Esterase: x / RBC: x / WBC x   Sq Epi: x / Non Sq Epi: x / Bacteria: x        MEDICATIONS  (STANDING):  atorvastatin 20 milliGRAM(s) Oral at bedtime  hydrocortisone sodium succinate Injectable 50 milliGRAM(s) IV Push every 8 hours  levothyroxine 88 MICROGram(s) Oral daily  metoprolol succinate  milliGRAM(s) Oral daily  pantoprazole  Injectable 40 milliGRAM(s) IV Push two times a day  sodium chloride 0.9%. 1000 milliLiter(s) (75 mL/Hr) IV Continuous <Continuous>    MEDICATIONS  (PRN):  acetaminophen     Tablet .. 650 milliGRAM(s) Oral every 6 hours PRN Temp greater or equal to 38C (100.4F)  acetaminophen   IVPB .. 1000 milliGRAM(s) IV Intermittent every 6 hours PRN Mild Pain (1 - 3)  acetaminophen   IVPB .. 1000 milliGRAM(s) IV Intermittent every 6 hours PRN Mild Pain (1 - 3)  morphine  - Injectable 2 milliGRAM(s) IV Push every 4 hours PRN Severe Pain (7 - 10)  ondansetron Injectable 4 milliGRAM(s) IV Push every 6 hours PRN Nausea      DVT Prophylaxis: V    Advanced Directives:  Discussed with:    Visit Information:    ** Time is exclusive of billed procedures and/or teaching and/or routine family updates.

## 2024-04-27 LAB
ANION GAP SERPL CALC-SCNC: 4 MMOL/L — LOW (ref 5–17)
BUN SERPL-MCNC: 17 MG/DL — SIGNIFICANT CHANGE UP (ref 7–23)
CALCIUM SERPL-MCNC: 9 MG/DL — SIGNIFICANT CHANGE UP (ref 8.5–10.1)
CHLORIDE SERPL-SCNC: 113 MMOL/L — HIGH (ref 96–108)
CO2 SERPL-SCNC: 25 MMOL/L — SIGNIFICANT CHANGE UP (ref 22–31)
CREAT SERPL-MCNC: 0.69 MG/DL — SIGNIFICANT CHANGE UP (ref 0.5–1.3)
EGFR: 83 ML/MIN/1.73M2 — SIGNIFICANT CHANGE UP
GLUCOSE SERPL-MCNC: 130 MG/DL — HIGH (ref 70–99)
HCT VFR BLD CALC: 26.2 % — LOW (ref 34.5–45)
HCT VFR BLD CALC: 28.3 % — LOW (ref 34.5–45)
HGB BLD-MCNC: 8.8 G/DL — LOW (ref 11.5–15.5)
HGB BLD-MCNC: 9.2 G/DL — LOW (ref 11.5–15.5)
MAGNESIUM SERPL-MCNC: 1.9 MG/DL — SIGNIFICANT CHANGE UP (ref 1.6–2.6)
MCHC RBC-ENTMCNC: 31.2 PG — SIGNIFICANT CHANGE UP (ref 27–34)
MCHC RBC-ENTMCNC: 31.3 PG — SIGNIFICANT CHANGE UP (ref 27–34)
MCHC RBC-ENTMCNC: 32.5 GM/DL — SIGNIFICANT CHANGE UP (ref 32–36)
MCHC RBC-ENTMCNC: 33.6 GM/DL — SIGNIFICANT CHANGE UP (ref 32–36)
MCV RBC AUTO: 93.2 FL — SIGNIFICANT CHANGE UP (ref 80–100)
MCV RBC AUTO: 95.9 FL — SIGNIFICANT CHANGE UP (ref 80–100)
PHOSPHATE SERPL-MCNC: 2.1 MG/DL — LOW (ref 2.5–4.5)
PLATELET # BLD AUTO: 114 K/UL — LOW (ref 150–400)
PLATELET # BLD AUTO: 170 K/UL — SIGNIFICANT CHANGE UP (ref 150–400)
POTASSIUM SERPL-MCNC: 4.2 MMOL/L — SIGNIFICANT CHANGE UP (ref 3.5–5.3)
POTASSIUM SERPL-SCNC: 4.2 MMOL/L — SIGNIFICANT CHANGE UP (ref 3.5–5.3)
RBC # BLD: 2.81 M/UL — LOW (ref 3.8–5.2)
RBC # BLD: 2.95 M/UL — LOW (ref 3.8–5.2)
RBC # FLD: 13.4 % — SIGNIFICANT CHANGE UP (ref 10.3–14.5)
RBC # FLD: 13.5 % — SIGNIFICANT CHANGE UP (ref 10.3–14.5)
SODIUM SERPL-SCNC: 142 MMOL/L — SIGNIFICANT CHANGE UP (ref 135–145)
WBC # BLD: 11.43 K/UL — HIGH (ref 3.8–10.5)
WBC # BLD: 12.74 K/UL — HIGH (ref 3.8–10.5)
WBC # FLD AUTO: 11.43 K/UL — HIGH (ref 3.8–10.5)
WBC # FLD AUTO: 12.74 K/UL — HIGH (ref 3.8–10.5)

## 2024-04-27 RX ORDER — GABAPENTIN 400 MG/1
300 CAPSULE ORAL DAILY
Refills: 0 | Status: DISCONTINUED | OUTPATIENT
Start: 2024-04-27 | End: 2024-04-30

## 2024-04-27 RX ORDER — FOLIC ACID 0.8 MG
1 TABLET ORAL DAILY
Refills: 0 | Status: DISCONTINUED | OUTPATIENT
Start: 2024-04-27 | End: 2024-04-30

## 2024-04-27 RX ORDER — RALOXIFENE HYDROCHLORIDE 60 MG/1
60 TABLET, COATED ORAL DAILY
Refills: 0 | Status: DISCONTINUED | OUTPATIENT
Start: 2024-04-27 | End: 2024-04-30

## 2024-04-27 RX ORDER — DRONEDARONE 400 MG/1
400 TABLET, FILM COATED ORAL
Refills: 0 | Status: DISCONTINUED | OUTPATIENT
Start: 2024-04-27 | End: 2024-04-30

## 2024-04-27 RX ADMIN — DRONEDARONE 400 MILLIGRAM(S): 400 TABLET, FILM COATED ORAL at 21:14

## 2024-04-27 RX ADMIN — Medication 1 MILLIGRAM(S): at 15:01

## 2024-04-27 RX ADMIN — Medication 5 MILLIGRAM(S): at 10:49

## 2024-04-27 RX ADMIN — Medication 100 MILLIGRAM(S): at 15:01

## 2024-04-27 RX ADMIN — PANTOPRAZOLE SODIUM 40 MILLIGRAM(S): 20 TABLET, DELAYED RELEASE ORAL at 10:49

## 2024-04-27 RX ADMIN — PANTOPRAZOLE SODIUM 40 MILLIGRAM(S): 20 TABLET, DELAYED RELEASE ORAL at 21:16

## 2024-04-27 RX ADMIN — Medication 85 MILLIMOLE(S): at 10:49

## 2024-04-27 RX ADMIN — HEPARIN SODIUM 5000 UNIT(S): 5000 INJECTION INTRAVENOUS; SUBCUTANEOUS at 21:15

## 2024-04-27 RX ADMIN — HEPARIN SODIUM 5000 UNIT(S): 5000 INJECTION INTRAVENOUS; SUBCUTANEOUS at 15:12

## 2024-04-27 RX ADMIN — HEPARIN SODIUM 5000 UNIT(S): 5000 INJECTION INTRAVENOUS; SUBCUTANEOUS at 06:19

## 2024-04-27 RX ADMIN — ATORVASTATIN CALCIUM 20 MILLIGRAM(S): 80 TABLET, FILM COATED ORAL at 21:15

## 2024-04-27 RX ADMIN — SODIUM CHLORIDE 75 MILLILITER(S): 9 INJECTION INTRAMUSCULAR; INTRAVENOUS; SUBCUTANEOUS at 06:18

## 2024-04-27 RX ADMIN — Medication 88 MICROGRAM(S): at 06:19

## 2024-04-27 RX ADMIN — GABAPENTIN 300 MILLIGRAM(S): 400 CAPSULE ORAL at 15:01

## 2024-04-27 NOTE — PHYSICAL THERAPY INITIAL EVALUATION ADULT - GENERAL OBSERVATIONS, REHAB EVAL
Pt seen for 45min PT Eval. Pt rec'd sitting in chair in NAD, satting 97% on RA, +tele, +IV. Pt requires CGA for all mobility, trans and amb 100ft with RW. pt dem dec endurance, dec step length. Pt left sitting in chair in NAD all needs met, +chair alarm, RN Aware.

## 2024-04-27 NOTE — PHYSICAL THERAPY INITIAL EVALUATION ADULT - PERTINENT HX OF CURRENT PROBLEM, REHAB EVAL
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    XRAY C-SPINE: Degenerative spondylosis as described.  CTAP: Increase in size of lobulated mass contiguous with greater curvature of stomach since 4/5/2024 with newly evident internal hyperdensity compatible with hemorrhage. Newly evident hyperdense left lateral perisplenic fluid compatible with hemorrhage. Newly evident hyperdense fluid in posterior pelvis compatible with hemorrhage.

## 2024-04-27 NOTE — PROGRESS NOTE ADULT - SUBJECTIVE AND OBJECTIVE BOX
Pt seen and examined at bedside, no acute events. Sleeping comfortably. Voiding freely. Denied fever, chills, nausea, vomiting or SOB overnight.     Vital Signs Last 24 Hrs  T(C): 36.6 (26 Apr 2024 20:57), Max: 36.7 (26 Apr 2024 09:20)  T(F): 97.8 (26 Apr 2024 20:57), Max: 98.1 (26 Apr 2024 13:27)  HR: 70 (27 Apr 2024 05:00) (62 - 80)  BP: 149/64 (27 Apr 2024 05:00) (91/77 - 150/66)  BP(mean): 89 (27 Apr 2024 05:00) (78 - 93)  RR: 25 (27 Apr 2024 05:00) (16 - 34)  SpO2: 96% (27 Apr 2024 05:00) (91% - 98%)    Parameters below as of 26 Apr 2024 12:00  Patient On (Oxygen Delivery Method): nasal cannula  O2 Flow (L/min): 4    MEDICATIONS  (STANDING):  atorvastatin 20 milliGRAM(s) Oral at bedtime  heparin   Injectable 5000 Unit(s) SubCutaneous every 8 hours  levothyroxine 88 MICROGram(s) Oral daily  metoprolol succinate  milliGRAM(s) Oral daily  pantoprazole  Injectable 40 milliGRAM(s) IV Push two times a day  sodium chloride 0.9%. 1000 milliLiter(s) (75 mL/Hr) IV Continuous <Continuous>    MEDICATIONS  (PRN):  acetaminophen     Tablet .. 650 milliGRAM(s) Oral every 6 hours PRN Temp greater or equal to 38C (100.4F)  acetaminophen   IVPB .. 1000 milliGRAM(s) IV Intermittent every 6 hours PRN Mild Pain (1 - 3)  acetaminophen   IVPB .. 1000 milliGRAM(s) IV Intermittent every 6 hours PRN Mild Pain (1 - 3)  morphine  - Injectable 2 milliGRAM(s) IV Push every 4 hours PRN Severe Pain (7 - 10)  ondansetron Injectable 4 milliGRAM(s) IV Push every 6 hours PRN Nausea                          9.9    14.03 )-----------( 160      ( 26 Apr 2024 05:25 )             28.7     04-26    137  |  108  |  10  ----------------------------<  137<H>  3.9   |  24  |  0.64    Ca    9.1      26 Apr 2024 05:25  Phos  2.3     04-26  Mg     2.0     04-26      I&O's Summary    25 Apr 2024 07:01  -  26 Apr 2024 07:00  --------------------------------------------------------  IN: 3200 mL / OUT: 900 mL / NET: 2300 mL    26 Apr 2024 07:01  -  27 Apr 2024 06:31  --------------------------------------------------------  IN: 900 mL / OUT: 2100 mL / NET: -1200 mL        Physical Exam:  Pt is AAOx3  General: Well developed, in no acute distress.   Chest: Lungs clear, no rales, no rhonchi, no wheezes.   Heart: RR, no murmurs, no rubs, no gallops.   Abdomen: Soft, mildly tender, nondistended, surgical sites c/d/i  Back: Normal curvature, no tenderness.   Neuro: Physiological, no localizing findings.   Skin: Normal, no rashes, no lesions noted.   Extremities: Warm, well perfused, no edema, Pulses intact

## 2024-04-27 NOTE — PROGRESS NOTE ADULT - ASSESSMENT
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 Martines

## 2024-04-28 LAB
ALBUMIN SERPL ELPH-MCNC: 1.6 G/DL — LOW (ref 3.3–5)
ALP SERPL-CCNC: 47 U/L — SIGNIFICANT CHANGE UP (ref 40–120)
ALT FLD-CCNC: 10 U/L — LOW (ref 12–78)
ANION GAP SERPL CALC-SCNC: 4 MMOL/L — LOW (ref 5–17)
AST SERPL-CCNC: 21 U/L — SIGNIFICANT CHANGE UP (ref 15–37)
BASOPHILS # BLD AUTO: 0.01 K/UL — SIGNIFICANT CHANGE UP (ref 0–0.2)
BASOPHILS # BLD AUTO: 0.02 K/UL — SIGNIFICANT CHANGE UP (ref 0–0.2)
BASOPHILS NFR BLD AUTO: 0.1 % — SIGNIFICANT CHANGE UP (ref 0–2)
BASOPHILS NFR BLD AUTO: 0.2 % — SIGNIFICANT CHANGE UP (ref 0–2)
BILIRUB SERPL-MCNC: 0.3 MG/DL — SIGNIFICANT CHANGE UP (ref 0.2–1.2)
BUN SERPL-MCNC: 14 MG/DL — SIGNIFICANT CHANGE UP (ref 7–23)
CALCIUM SERPL-MCNC: 8.7 MG/DL — SIGNIFICANT CHANGE UP (ref 8.5–10.1)
CHLORIDE SERPL-SCNC: 113 MMOL/L — HIGH (ref 96–108)
CO2 SERPL-SCNC: 26 MMOL/L — SIGNIFICANT CHANGE UP (ref 22–31)
CREAT SERPL-MCNC: 0.76 MG/DL — SIGNIFICANT CHANGE UP (ref 0.5–1.3)
EGFR: 75 ML/MIN/1.73M2 — SIGNIFICANT CHANGE UP
EOSINOPHIL # BLD AUTO: 0.1 K/UL — SIGNIFICANT CHANGE UP (ref 0–0.5)
EOSINOPHIL # BLD AUTO: 0.16 K/UL — SIGNIFICANT CHANGE UP (ref 0–0.5)
EOSINOPHIL NFR BLD AUTO: 1.3 % — SIGNIFICANT CHANGE UP (ref 0–6)
EOSINOPHIL NFR BLD AUTO: 1.6 % — SIGNIFICANT CHANGE UP (ref 0–6)
GLUCOSE SERPL-MCNC: 86 MG/DL — SIGNIFICANT CHANGE UP (ref 70–99)
HCT VFR BLD CALC: 24.9 % — LOW (ref 34.5–45)
HCT VFR BLD CALC: 30.9 % — LOW (ref 34.5–45)
HGB BLD-MCNC: 8.1 G/DL — LOW (ref 11.5–15.5)
HGB BLD-MCNC: 9.7 G/DL — LOW (ref 11.5–15.5)
IMM GRANULOCYTES NFR BLD AUTO: 0.4 % — SIGNIFICANT CHANGE UP (ref 0–0.9)
IMM GRANULOCYTES NFR BLD AUTO: 0.6 % — SIGNIFICANT CHANGE UP (ref 0–0.9)
LYMPHOCYTES # BLD AUTO: 1.56 K/UL — SIGNIFICANT CHANGE UP (ref 1–3.3)
LYMPHOCYTES # BLD AUTO: 1.66 K/UL — SIGNIFICANT CHANGE UP (ref 1–3.3)
LYMPHOCYTES # BLD AUTO: 15.3 % — SIGNIFICANT CHANGE UP (ref 13–44)
LYMPHOCYTES # BLD AUTO: 22.2 % — SIGNIFICANT CHANGE UP (ref 13–44)
MAGNESIUM SERPL-MCNC: 1.7 MG/DL — SIGNIFICANT CHANGE UP (ref 1.6–2.6)
MCHC RBC-ENTMCNC: 30.5 PG — SIGNIFICANT CHANGE UP (ref 27–34)
MCHC RBC-ENTMCNC: 31.2 PG — SIGNIFICANT CHANGE UP (ref 27–34)
MCHC RBC-ENTMCNC: 31.4 GM/DL — LOW (ref 32–36)
MCHC RBC-ENTMCNC: 32.5 GM/DL — SIGNIFICANT CHANGE UP (ref 32–36)
MCV RBC AUTO: 95.8 FL — SIGNIFICANT CHANGE UP (ref 80–100)
MCV RBC AUTO: 97.2 FL — SIGNIFICANT CHANGE UP (ref 80–100)
MONOCYTES # BLD AUTO: 0.78 K/UL — SIGNIFICANT CHANGE UP (ref 0–0.9)
MONOCYTES # BLD AUTO: 0.81 K/UL — SIGNIFICANT CHANGE UP (ref 0–0.9)
MONOCYTES NFR BLD AUTO: 10.4 % — SIGNIFICANT CHANGE UP (ref 2–14)
MONOCYTES NFR BLD AUTO: 7.9 % — SIGNIFICANT CHANGE UP (ref 2–14)
NEUTROPHILS # BLD AUTO: 4.91 K/UL — SIGNIFICANT CHANGE UP (ref 1.8–7.4)
NEUTROPHILS # BLD AUTO: 7.6 K/UL — HIGH (ref 1.8–7.4)
NEUTROPHILS NFR BLD AUTO: 65.6 % — SIGNIFICANT CHANGE UP (ref 43–77)
NEUTROPHILS NFR BLD AUTO: 74.4 % — SIGNIFICANT CHANGE UP (ref 43–77)
PHOSPHATE SERPL-MCNC: 2.4 MG/DL — LOW (ref 2.5–4.5)
PLATELET # BLD AUTO: 165 K/UL — SIGNIFICANT CHANGE UP (ref 150–400)
PLATELET # BLD AUTO: 220 K/UL — SIGNIFICANT CHANGE UP (ref 150–400)
POTASSIUM SERPL-MCNC: 3.4 MMOL/L — LOW (ref 3.5–5.3)
POTASSIUM SERPL-SCNC: 3.4 MMOL/L — LOW (ref 3.5–5.3)
PROT SERPL-MCNC: 5.1 GM/DL — LOW (ref 6–8.3)
RBC # BLD: 2.6 M/UL — LOW (ref 3.8–5.2)
RBC # BLD: 3.18 M/UL — LOW (ref 3.8–5.2)
RBC # FLD: 13.4 % — SIGNIFICANT CHANGE UP (ref 10.3–14.5)
RBC # FLD: 13.6 % — SIGNIFICANT CHANGE UP (ref 10.3–14.5)
SODIUM SERPL-SCNC: 143 MMOL/L — SIGNIFICANT CHANGE UP (ref 135–145)
WBC # BLD: 10.21 K/UL — SIGNIFICANT CHANGE UP (ref 3.8–10.5)
WBC # BLD: 7.49 K/UL — SIGNIFICANT CHANGE UP (ref 3.8–10.5)
WBC # FLD AUTO: 10.21 K/UL — SIGNIFICANT CHANGE UP (ref 3.8–10.5)
WBC # FLD AUTO: 7.49 K/UL — SIGNIFICANT CHANGE UP (ref 3.8–10.5)

## 2024-04-28 RX ORDER — SODIUM,POTASSIUM PHOSPHATES 278-250MG
1 POWDER IN PACKET (EA) ORAL ONCE
Refills: 0 | Status: COMPLETED | OUTPATIENT
Start: 2024-04-28 | End: 2024-04-28

## 2024-04-28 RX ORDER — POTASSIUM CHLORIDE 20 MEQ
20 PACKET (EA) ORAL ONCE
Refills: 0 | Status: COMPLETED | OUTPATIENT
Start: 2024-04-28 | End: 2024-04-28

## 2024-04-28 RX ADMIN — Medication 400 MILLIGRAM(S): at 05:56

## 2024-04-28 RX ADMIN — DRONEDARONE 400 MILLIGRAM(S): 400 TABLET, FILM COATED ORAL at 10:42

## 2024-04-28 RX ADMIN — DRONEDARONE 400 MILLIGRAM(S): 400 TABLET, FILM COATED ORAL at 21:43

## 2024-04-28 RX ADMIN — Medication 88 MICROGRAM(S): at 05:51

## 2024-04-28 RX ADMIN — PANTOPRAZOLE SODIUM 40 MILLIGRAM(S): 20 TABLET, DELAYED RELEASE ORAL at 10:26

## 2024-04-28 RX ADMIN — HEPARIN SODIUM 5000 UNIT(S): 5000 INJECTION INTRAVENOUS; SUBCUTANEOUS at 05:51

## 2024-04-28 RX ADMIN — Medication 1 PACKET(S): at 17:25

## 2024-04-28 RX ADMIN — PANTOPRAZOLE SODIUM 40 MILLIGRAM(S): 20 TABLET, DELAYED RELEASE ORAL at 21:43

## 2024-04-28 RX ADMIN — HEPARIN SODIUM 5000 UNIT(S): 5000 INJECTION INTRAVENOUS; SUBCUTANEOUS at 14:04

## 2024-04-28 RX ADMIN — GABAPENTIN 300 MILLIGRAM(S): 400 CAPSULE ORAL at 10:31

## 2024-04-28 RX ADMIN — Medication 1000 MILLIGRAM(S): at 06:26

## 2024-04-28 RX ADMIN — HEPARIN SODIUM 5000 UNIT(S): 5000 INJECTION INTRAVENOUS; SUBCUTANEOUS at 21:43

## 2024-04-28 RX ADMIN — Medication 1 MILLIGRAM(S): at 10:26

## 2024-04-28 RX ADMIN — Medication 20 MILLIEQUIVALENT(S): at 14:07

## 2024-04-28 RX ADMIN — Medication 100 MILLIGRAM(S): at 10:25

## 2024-04-28 RX ADMIN — RALOXIFENE HYDROCHLORIDE 60 MILLIGRAM(S): 60 TABLET, COATED ORAL at 10:26

## 2024-04-28 RX ADMIN — ATORVASTATIN CALCIUM 20 MILLIGRAM(S): 80 TABLET, FILM COATED ORAL at 21:43

## 2024-04-28 RX ADMIN — Medication 5 MILLIGRAM(S): at 10:25

## 2024-04-28 NOTE — PROGRESS NOTE ADULT - SUBJECTIVE AND OBJECTIVE BOX
SURGERY DAILY PROGRESS NOTE:     Subjective:  Patient seen and examined at bedside during am rounds, reports doing well, rafiq diet and having bowel fxn. Pt was downgraded from SICU yesterday. Pt h/h trended down, repeat stable, AVSS. Denies any fevers, chills, n/v/d, chest pain or shortness of breath    Objective:    MEDICATIONS  (STANDING):  atorvastatin 20 milliGRAM(s) Oral at bedtime  dronedarone 400 milliGRAM(s) Oral two times a day  folic acid 1 milliGRAM(s) Oral daily  gabapentin 300 milliGRAM(s) Oral daily  heparin   Injectable 5000 Unit(s) SubCutaneous every 8 hours  levothyroxine 88 MICROGram(s) Oral daily  metoprolol succinate  milliGRAM(s) Oral daily  pantoprazole  Injectable 40 milliGRAM(s) IV Push two times a day  predniSONE   Tablet 5 milliGRAM(s) Oral daily  raloxifene 60 milliGRAM(s) Oral daily  sodium chloride 0.9%. 1000 milliLiter(s) (30 mL/Hr) IV Continuous <Continuous>    MEDICATIONS  (PRN):  acetaminophen     Tablet .. 650 milliGRAM(s) Oral every 6 hours PRN Temp greater or equal to 38C (100.4F)  acetaminophen   IVPB .. 1000 milliGRAM(s) IV Intermittent every 6 hours PRN Mild Pain (1 - 3)  acetaminophen   IVPB .. 1000 milliGRAM(s) IV Intermittent every 6 hours PRN Mild Pain (1 - 3)  morphine  - Injectable 2 milliGRAM(s) IV Push every 4 hours PRN Severe Pain (7 - 10)  ondansetron Injectable 4 milliGRAM(s) IV Push every 6 hours PRN Nausea      Vital Signs Last 24 Hrs  T(C): 36.2 (28 Apr 2024 04:20), Max: 37.5 (27 Apr 2024 22:11)  T(F): 97.1 (28 Apr 2024 04:20), Max: 99.5 (27 Apr 2024 22:11)  HR: 60 (28 Apr 2024 04:20) (60 - 87)  BP: 119/56 (28 Apr 2024 04:20) (119/56 - 149/69)  BP(mean): 101 (27 Apr 2024 14:30) (91 - 101)  RR: 18 (28 Apr 2024 04:20) (18 - 32)  SpO2: 96% (28 Apr 2024 04:20) (89% - 97%)    Parameters below as of 28 Apr 2024 04:20  Patient On (Oxygen Delivery Method): nasal cannula          PHYSICAL EXAM   Gen: well-appearing, in no acute distress  CV: pulse regularly present   Resp: airway patent, non-labored breathing  Abd: soft, NTND; no rebound or guarding. Incision c/d/i  Ext. no cyanosis or edema      I&O's Detail    26 Apr 2024 07:01  -  27 Apr 2024 07:00  --------------------------------------------------------  IN:    sodium chloride 0.9%: 900 mL  Total IN: 900 mL    OUT:    Indwelling Catheter - Urethral (mL): 1700 mL    Voided (mL): 400 mL  Total OUT: 2100 mL    Total NET: -1200 mL      27 Apr 2024 07:01  -  28 Apr 2024 05:35  --------------------------------------------------------  IN:  Total IN: 0 mL    OUT:    Voided (mL): 300 mL  Total OUT: 300 mL    Total NET: -300 mL          Daily     Daily     LABS:                        9.2    11.43 )-----------( 114      ( 27 Apr 2024 16:49 )             28.3     04-27    142  |  113<H>  |  17  ----------------------------<  130<H>  4.2   |  25  |  0.69    Ca    9.0      27 Apr 2024 05:58  Phos  2.1     04-27  Mg     1.9     04-27        Urinalysis Basic - ( 27 Apr 2024 05:58 )    Color: x / Appearance: x / SG: x / pH: x  Gluc: 130 mg/dL / Ketone: x  / Bili: x / Urobili: x   Blood: x / Protein: x / Nitrite: x   Leuk Esterase: x / RBC: x / WBC x   Sq Epi: x / Non Sq Epi: x / Bacteria: x

## 2024-04-28 NOTE — PROGRESS NOTE ADULT - ASSESSMENT
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

## 2024-04-29 ENCOUNTER — TRANSCRIPTION ENCOUNTER (OUTPATIENT)
Age: 89
End: 2024-04-29

## 2024-04-29 LAB
ALBUMIN SERPL ELPH-MCNC: 1.7 G/DL — LOW (ref 3.3–5)
ALP SERPL-CCNC: 57 U/L — SIGNIFICANT CHANGE UP (ref 40–120)
ALT FLD-CCNC: 14 U/L — SIGNIFICANT CHANGE UP (ref 12–78)
ANION GAP SERPL CALC-SCNC: 2 MMOL/L — LOW (ref 5–17)
AST SERPL-CCNC: 22 U/L — SIGNIFICANT CHANGE UP (ref 15–37)
BASOPHILS # BLD AUTO: 0.02 K/UL — SIGNIFICANT CHANGE UP (ref 0–0.2)
BASOPHILS NFR BLD AUTO: 0.3 % — SIGNIFICANT CHANGE UP (ref 0–2)
BILIRUB SERPL-MCNC: 0.4 MG/DL — SIGNIFICANT CHANGE UP (ref 0.2–1.2)
BUN SERPL-MCNC: 12 MG/DL — SIGNIFICANT CHANGE UP (ref 7–23)
CALCIUM SERPL-MCNC: 8.6 MG/DL — SIGNIFICANT CHANGE UP (ref 8.5–10.1)
CHLORIDE SERPL-SCNC: 111 MMOL/L — HIGH (ref 96–108)
CO2 SERPL-SCNC: 28 MMOL/L — SIGNIFICANT CHANGE UP (ref 22–31)
CREAT SERPL-MCNC: 0.68 MG/DL — SIGNIFICANT CHANGE UP (ref 0.5–1.3)
EGFR: 84 ML/MIN/1.73M2 — SIGNIFICANT CHANGE UP
EOSINOPHIL # BLD AUTO: 0.25 K/UL — SIGNIFICANT CHANGE UP (ref 0–0.5)
EOSINOPHIL NFR BLD AUTO: 3.5 % — SIGNIFICANT CHANGE UP (ref 0–6)
GLUCOSE SERPL-MCNC: 88 MG/DL — SIGNIFICANT CHANGE UP (ref 70–99)
HCT VFR BLD CALC: 26.1 % — LOW (ref 34.5–45)
HGB BLD-MCNC: 8.6 G/DL — LOW (ref 11.5–15.5)
IMM GRANULOCYTES NFR BLD AUTO: 1.1 % — HIGH (ref 0–0.9)
LYMPHOCYTES # BLD AUTO: 1.79 K/UL — SIGNIFICANT CHANGE UP (ref 1–3.3)
LYMPHOCYTES # BLD AUTO: 24.8 % — SIGNIFICANT CHANGE UP (ref 13–44)
MAGNESIUM SERPL-MCNC: 1.8 MG/DL — SIGNIFICANT CHANGE UP (ref 1.6–2.6)
MCHC RBC-ENTMCNC: 31 PG — SIGNIFICANT CHANGE UP (ref 27–34)
MCHC RBC-ENTMCNC: 33 GM/DL — SIGNIFICANT CHANGE UP (ref 32–36)
MCV RBC AUTO: 94.2 FL — SIGNIFICANT CHANGE UP (ref 80–100)
MONOCYTES # BLD AUTO: 0.56 K/UL — SIGNIFICANT CHANGE UP (ref 0–0.9)
MONOCYTES NFR BLD AUTO: 7.7 % — SIGNIFICANT CHANGE UP (ref 2–14)
NEUTROPHILS # BLD AUTO: 4.53 K/UL — SIGNIFICANT CHANGE UP (ref 1.8–7.4)
NEUTROPHILS NFR BLD AUTO: 62.6 % — SIGNIFICANT CHANGE UP (ref 43–77)
PHOSPHATE SERPL-MCNC: 2.5 MG/DL — SIGNIFICANT CHANGE UP (ref 2.5–4.5)
PLATELET # BLD AUTO: 189 K/UL — SIGNIFICANT CHANGE UP (ref 150–400)
POTASSIUM SERPL-MCNC: 3.7 MMOL/L — SIGNIFICANT CHANGE UP (ref 3.5–5.3)
POTASSIUM SERPL-SCNC: 3.7 MMOL/L — SIGNIFICANT CHANGE UP (ref 3.5–5.3)
PROT SERPL-MCNC: 5.2 GM/DL — LOW (ref 6–8.3)
RBC # BLD: 2.77 M/UL — LOW (ref 3.8–5.2)
RBC # FLD: 13.4 % — SIGNIFICANT CHANGE UP (ref 10.3–14.5)
SODIUM SERPL-SCNC: 141 MMOL/L — SIGNIFICANT CHANGE UP (ref 135–145)
WBC # BLD: 7.23 K/UL — SIGNIFICANT CHANGE UP (ref 3.8–10.5)
WBC # FLD AUTO: 7.23 K/UL — SIGNIFICANT CHANGE UP (ref 3.8–10.5)

## 2024-04-29 RX ORDER — MAGNESIUM SULFATE 500 MG/ML
1 VIAL (ML) INJECTION ONCE
Refills: 0 | Status: COMPLETED | OUTPATIENT
Start: 2024-04-29 | End: 2024-04-29

## 2024-04-29 RX ORDER — POTASSIUM CHLORIDE 20 MEQ
20 PACKET (EA) ORAL
Refills: 0 | Status: COMPLETED | OUTPATIENT
Start: 2024-04-29 | End: 2024-04-29

## 2024-04-29 RX ADMIN — Medication 100 GRAM(S): at 14:04

## 2024-04-29 RX ADMIN — Medication 1 MILLIGRAM(S): at 11:55

## 2024-04-29 RX ADMIN — PANTOPRAZOLE SODIUM 40 MILLIGRAM(S): 20 TABLET, DELAYED RELEASE ORAL at 11:56

## 2024-04-29 RX ADMIN — HEPARIN SODIUM 5000 UNIT(S): 5000 INJECTION INTRAVENOUS; SUBCUTANEOUS at 05:33

## 2024-04-29 RX ADMIN — DRONEDARONE 400 MILLIGRAM(S): 400 TABLET, FILM COATED ORAL at 11:55

## 2024-04-29 RX ADMIN — Medication 100 MILLIGRAM(S): at 11:56

## 2024-04-29 RX ADMIN — RALOXIFENE HYDROCHLORIDE 60 MILLIGRAM(S): 60 TABLET, COATED ORAL at 11:57

## 2024-04-29 RX ADMIN — Medication 88 MICROGRAM(S): at 05:33

## 2024-04-29 RX ADMIN — ATORVASTATIN CALCIUM 20 MILLIGRAM(S): 80 TABLET, FILM COATED ORAL at 22:42

## 2024-04-29 RX ADMIN — Medication 5 MILLIGRAM(S): at 11:57

## 2024-04-29 RX ADMIN — DRONEDARONE 400 MILLIGRAM(S): 400 TABLET, FILM COATED ORAL at 22:41

## 2024-04-29 RX ADMIN — HEPARIN SODIUM 5000 UNIT(S): 5000 INJECTION INTRAVENOUS; SUBCUTANEOUS at 16:18

## 2024-04-29 RX ADMIN — GABAPENTIN 300 MILLIGRAM(S): 400 CAPSULE ORAL at 11:55

## 2024-04-29 RX ADMIN — Medication 20 MILLIEQUIVALENT(S): at 14:04

## 2024-04-29 RX ADMIN — HEPARIN SODIUM 5000 UNIT(S): 5000 INJECTION INTRAVENOUS; SUBCUTANEOUS at 22:42

## 2024-04-29 RX ADMIN — PANTOPRAZOLE SODIUM 40 MILLIGRAM(S): 20 TABLET, DELAYED RELEASE ORAL at 22:42

## 2024-04-29 RX ADMIN — Medication 20 MILLIEQUIVALENT(S): at 16:18

## 2024-04-29 NOTE — CHART NOTE - NSCHARTNOTEFT_GEN_A_CORE
Patient requires a rolling walker for d/c to home to assist with ADL's due to diagnosis of gastric tumor for which she underwent surgical resection. Patient requires a rolling walker for d/c to home to assist with ADL's due to diagnosis of gastric tumor for which she underwent surgical resection.    Patient requires a bedside commode for d/c home due to patient is at risk of falling and cannot walk safely to the bathroom. Patient is confined to a single room and cannot safely ambulate to the bathroom. Therefore she requires a rolling walker for discharge to home to assist with ADL's due to diagnosis of gastric tumor, for which she underwent surgical resection. Patient requires a bedside commode for d/c home due to patient is at high risk of falling.

## 2024-04-29 NOTE — DISCHARGE NOTE NURSING/CASE MANAGEMENT/SOCIAL WORK - NSDCCRNAME_GEN_ALL_CORE_FT
Discharge planning folder provided including private hire list, home care agency list, community  resources including life alert information
To get better and follow your care plan as instructed.

## 2024-04-29 NOTE — PROVIDER CONTACT NOTE (OTHER) - REASON
R/A
Dr Rosendo James (PCP)
Pt complaining of swollen feet with periods of tightness and b/l interment leg pain

## 2024-04-29 NOTE — PROGRESS NOTE ADULT - SUBJECTIVE AND OBJECTIVE BOX
SURGERY DAILY PROGRESS NOTE:     Subjective:  Patient seen and examined at bedside during am rounds, reports doing well,Joby reg diet, passing gas, pain ok, both legs swollen no new pain. Denies any fevers, chills, n/v/d, chest pain or shortness of breath    Objective:      PHYSICAL EXAM   Gen: well-appearing, in no acute distress  CV: pulse regularly present   Resp: airway patent, non-labored breathing  Abd: soft, NTND; no rebound or guarding. Incision c/d/i  Ext. no cyanosis , bilateral LE edema, non tender        Vitals:  T(C): 36.9 ( @ 04:45), Max: 37.2 ( @ 08:55)  HR: 64 ( @ 04:45) (64 - 88)  BP: 138/55 ( @ 04:45) (128/56 - 139/55)  RR: 18 ( @ 04:45) (17 - 18)  SpO2: 96% ( @ 04:45) (92% - 96%)         @ 05:42                    8.6  CBC: 7.23>)-------(<189                     26.1                 141 | 111 | 12    CMP:  ----------------------< 88               3.7 | 28 | 0.68                      Ca:8.6  Phos:2.5  M.8               0.4|      |22        LFTs:  ------|57|-----             -|      | @ 16:55                    9.7  CBC: 10.21>)-------(<220                     30.9                 - | - | -    CMP:  ----------------------< -               - | - | -                      Ca:-  Phos:-  Mg:-               -|      |-        LFTs:  ------|-|-----             -|      |-   @ 06:49                    8.1  CBC: 7.49>)-------(<165                     24.9                 143 | 113 | 14    CMP:  ----------------------< 86               3.4 | 26 | 0.76                      Ca:8.7  Phos:2.4  M.7               0.3|      |21        LFTs:  ------|47|-----             -|      |-            Current Inpatient Medications:  acetaminophen     Tablet .. 650 milliGRAM(s) Oral every 6 hours PRN  acetaminophen   IVPB .. 1000 milliGRAM(s) IV Intermittent every 6 hours PRN  atorvastatin 20 milliGRAM(s) Oral at bedtime  dronedarone 400 milliGRAM(s) Oral two times a day  folic acid 1 milliGRAM(s) Oral daily  gabapentin 300 milliGRAM(s) Oral daily  heparin   Injectable 5000 Unit(s) SubCutaneous every 8 hours  levothyroxine 88 MICROGram(s) Oral daily  metoprolol succinate  milliGRAM(s) Oral daily  morphine  - Injectable 2 milliGRAM(s) IV Push every 4 hours PRN  ondansetron Injectable 4 milliGRAM(s) IV Push every 6 hours PRN  pantoprazole  Injectable 40 milliGRAM(s) IV Push two times a day  predniSONE   Tablet 5 milliGRAM(s) Oral daily  raloxifene 60 milliGRAM(s) Oral daily

## 2024-04-29 NOTE — DISCHARGE NOTE NURSING/CASE MANAGEMENT/SOCIAL WORK - NSDCVIVACCINE_GEN_ALL_CORE_FT
Tdap; 18-Jun-2022 22:44; Deidre Mena (RN); [employer]; [id number]   (Exp. Date: 11-Mar-2024); IntraMuscular; Deltoid Left.; 0.5 milliLiter(s); VIS (VIS Published: 09-May-2013, VIS Presented: 18-Jun-2022);

## 2024-04-29 NOTE — DISCHARGE NOTE NURSING/CASE MANAGEMENT/SOCIAL WORK - NSDCDMENAME_GEN_ALL_CORE_FT
Rolling walker and 3:1 commode ordered from [facility] and  to be delivered to bedside prior to discharge

## 2024-04-29 NOTE — PROVIDER CONTACT NOTE (OTHER) - SITUATION
Office is aware that patient is in [facility].  Please fax discharge papers to [phone].
Pt complaining of swollen feet with periods of tightness and b/l interment leg pain. Pt states "wears compression stockings at home for 12-14 hrs a day".
Dr. Amaya does not come to hospitals. Will let morning U/S know.

## 2024-04-29 NOTE — PROGRESS NOTE ADULT - ASSESSMENT
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

## 2024-04-29 NOTE — DISCHARGE NOTE NURSING/CASE MANAGEMENT/SOCIAL WORK - PATIENT PORTAL LINK FT
You can access the FollowMyHealth Patient Portal offered by [facility] by registering at the following website: [email or web address]. By joining [facility]’s FollowMyHealth portal, you will also be able to view your health information using other applications (apps) compatible with our system.

## 2024-04-30 ENCOUNTER — TRANSCRIPTION ENCOUNTER (OUTPATIENT)
Age: 89
End: 2024-04-30

## 2024-04-30 VITALS
TEMPERATURE: 98 F | HEART RATE: 76 BPM | DIASTOLIC BLOOD PRESSURE: 43 MMHG | SYSTOLIC BLOOD PRESSURE: 100 MMHG | OXYGEN SATURATION: 94 % | RESPIRATION RATE: 18 BRPM

## 2024-04-30 LAB
ALBUMIN SERPL ELPH-MCNC: 1.7 G/DL — LOW (ref 3.3–5)
ALP SERPL-CCNC: 63 U/L — SIGNIFICANT CHANGE UP (ref 40–120)
ALT FLD-CCNC: 12 U/L — SIGNIFICANT CHANGE UP (ref 12–78)
ANION GAP SERPL CALC-SCNC: 4 MMOL/L — LOW (ref 5–17)
AST SERPL-CCNC: 17 U/L — SIGNIFICANT CHANGE UP (ref 15–37)
BILIRUB SERPL-MCNC: 0.4 MG/DL — SIGNIFICANT CHANGE UP (ref 0.2–1.2)
BUN SERPL-MCNC: 13 MG/DL — SIGNIFICANT CHANGE UP (ref 7–23)
CALCIUM SERPL-MCNC: 9.1 MG/DL — SIGNIFICANT CHANGE UP (ref 8.5–10.1)
CHLORIDE SERPL-SCNC: 109 MMOL/L — HIGH (ref 96–108)
CO2 SERPL-SCNC: 28 MMOL/L — SIGNIFICANT CHANGE UP (ref 22–31)
CREAT SERPL-MCNC: 0.72 MG/DL — SIGNIFICANT CHANGE UP (ref 0.5–1.3)
EGFR: 80 ML/MIN/1.73M2 — SIGNIFICANT CHANGE UP
GLUCOSE SERPL-MCNC: 92 MG/DL — SIGNIFICANT CHANGE UP (ref 70–99)
HCT VFR BLD CALC: 26.7 % — LOW (ref 34.5–45)
HGB BLD-MCNC: 8.7 G/DL — LOW (ref 11.5–15.5)
MAGNESIUM SERPL-MCNC: 2 MG/DL — SIGNIFICANT CHANGE UP (ref 1.6–2.6)
MCHC RBC-ENTMCNC: 31 PG — SIGNIFICANT CHANGE UP (ref 27–34)
MCHC RBC-ENTMCNC: 32.6 GM/DL — SIGNIFICANT CHANGE UP (ref 32–36)
MCV RBC AUTO: 95 FL — SIGNIFICANT CHANGE UP (ref 80–100)
PHOSPHATE SERPL-MCNC: 2.5 MG/DL — SIGNIFICANT CHANGE UP (ref 2.5–4.5)
PLATELET # BLD AUTO: 231 K/UL — SIGNIFICANT CHANGE UP (ref 150–400)
POTASSIUM SERPL-MCNC: 4.1 MMOL/L — SIGNIFICANT CHANGE UP (ref 3.5–5.3)
POTASSIUM SERPL-SCNC: 4.1 MMOL/L — SIGNIFICANT CHANGE UP (ref 3.5–5.3)
PROT SERPL-MCNC: 5.1 GM/DL — LOW (ref 6–8.3)
RBC # BLD: 2.81 M/UL — LOW (ref 3.8–5.2)
RBC # FLD: 13.5 % — SIGNIFICANT CHANGE UP (ref 10.3–14.5)
SODIUM SERPL-SCNC: 141 MMOL/L — SIGNIFICANT CHANGE UP (ref 135–145)
WBC # BLD: 7.19 K/UL — SIGNIFICANT CHANGE UP (ref 3.8–10.5)
WBC # FLD AUTO: 7.19 K/UL — SIGNIFICANT CHANGE UP (ref 3.8–10.5)

## 2024-04-30 RX ORDER — POLYETHYLENE GLYCOL 3350 17 G/17G
17 POWDER, FOR SOLUTION ORAL
Qty: 2 | Refills: 0
Start: 2024-04-30

## 2024-04-30 RX ORDER — OXYCODONE HYDROCHLORIDE 5 MG/1
1 TABLET ORAL
Qty: 6 | Refills: 0
Start: 2024-04-30 | End: 2024-05-01

## 2024-04-30 RX ADMIN — PANTOPRAZOLE SODIUM 40 MILLIGRAM(S): 20 TABLET, DELAYED RELEASE ORAL at 10:11

## 2024-04-30 RX ADMIN — GABAPENTIN 300 MILLIGRAM(S): 400 CAPSULE ORAL at 10:14

## 2024-04-30 RX ADMIN — HEPARIN SODIUM 5000 UNIT(S): 5000 INJECTION INTRAVENOUS; SUBCUTANEOUS at 14:03

## 2024-04-30 RX ADMIN — RALOXIFENE HYDROCHLORIDE 60 MILLIGRAM(S): 60 TABLET, COATED ORAL at 10:12

## 2024-04-30 RX ADMIN — Medication 100 MILLIGRAM(S): at 10:12

## 2024-04-30 RX ADMIN — HEPARIN SODIUM 5000 UNIT(S): 5000 INJECTION INTRAVENOUS; SUBCUTANEOUS at 05:44

## 2024-04-30 RX ADMIN — Medication 5 MILLIGRAM(S): at 10:14

## 2024-04-30 RX ADMIN — Medication 88 MICROGRAM(S): at 05:44

## 2024-04-30 RX ADMIN — DRONEDARONE 400 MILLIGRAM(S): 400 TABLET, FILM COATED ORAL at 10:12

## 2024-04-30 RX ADMIN — Medication 1 MILLIGRAM(S): at 10:13

## 2024-04-30 NOTE — PROGRESS NOTE ADULT - PROVIDER SPECIALTY LIST ADULT
Hospitalist
Surgery
Hospitalist
Surgery
Critical Care

## 2024-04-30 NOTE — DISCHARGE NOTE PROVIDER - CARE PROVIDER_API CALL
Manuel Martines  Surgery  [address]  [place] [address]  Phone: [phone]  Fax: [phone]  Follow Up Time: 2 weeks

## 2024-04-30 NOTE — PROGRESS NOTE ADULT - REASON FOR ADMISSION
bleeding GIST

## 2024-04-30 NOTE — PROGRESS NOTE ADULT - SUBJECTIVE AND OBJECTIVE BOX
SURGERY DAILY PROGRESS NOTE:     Subjective:  Patient seen and examined at bedside during am rounds, reports doing well, Joby reg diet, passing some gas, last  bm 2 days ago, feels bloated  Objective:      PHYSICAL EXAM   Gen: well-appearing, in no acute distress  CV: pulse regularly present   Resp: airway patent, non-labored breathing  Abd: soft, midlly distended, amber ttp,  ; no rebound or guarding. Incision c/d/i  Ext. no cyanosis , bilateral LE edema, non tender        Vitals:  T(C): 36.9 ( @ 04:45), Max: 37.2 ( @ 08:55)  HR: 64 ( @ 04:45) (64 - 88)  BP: 138/55 ( @ 04:45) (128/56 - 139/55)  RR: 18 ( @ 04:45) (17 - 18)  SpO2: 96% ( @ 04:45) (92% - 96%)         @ 05:42                    8.6  CBC: 7.23>)-------(<189                     26.1                 141 | 111 | 12    CMP:  ----------------------< 88               3.7 | 28 | 0.68                      Ca:8.6  Phos:2.5  M.8               0.4|      |22        LFTs:  ------|57|-----             -|      |-   @ 16:55                    9.7  CBC: 10.21>)-------(<220                     30.9                 - | - | -    CMP:  ----------------------< -               - | - | -                      Ca:-  Phos:-  Mg:-               -|      |-        LFTs:  ------|-|-----             -|      |-   @ 06:49                    8.1  CBC: 7.49>)-------(<165                     24.9                 143 | 113 | 14    CMP:  ----------------------< 86               3.4 | 26 | 0.76                      Ca:8.7  Phos:2.4  M.7               0.3|      |21        LFTs:  ------|47|-----             -|      |-            Current Inpatient Medications:  acetaminophen     Tablet .. 650 milliGRAM(s) Oral every 6 hours PRN  acetaminophen   IVPB .. 1000 milliGRAM(s) IV Intermittent every 6 hours PRN  atorvastatin 20 milliGRAM(s) Oral at bedtime  dronedarone 400 milliGRAM(s) Oral two times a day  folic acid 1 milliGRAM(s) Oral daily  gabapentin 300 milliGRAM(s) Oral daily  heparin   Injectable 5000 Unit(s) SubCutaneous every 8 hours  levothyroxine 88 MICROGram(s) Oral daily  metoprolol succinate  milliGRAM(s) Oral daily  morphine  - Injectable 2 milliGRAM(s) IV Push every 4 hours PRN  ondansetron Injectable 4 milliGRAM(s) IV Push every 6 hours PRN  pantoprazole  Injectable 40 milliGRAM(s) IV Push two times a day  predniSONE   Tablet 5 milliGRAM(s) Oral daily  raloxifene 60 milliGRAM(s) Oral daily                      SURGERY DAILY PROGRESS NOTE:     Subjective:  Patient seen and examined at bedside during am rounds, reports doing well, Joby reg diet, passing some gas, last  bm 2 days ago, feels bloated  Objective:      PHYSICAL EXAM   Gen: well-appearing, in no acute distress  CV: pulse regularly present   Resp: airway patent, non-labored breathing  Abd: soft, mildly distended, amber ttp,  ; no rebound or guarding. Incision c/d/i  Ext. no cyanosis , bilateral LE edema, non tender        Vitals:  T(C): 36.9 ( @ 04:45), Max: 37.2 ( @ 08:55)  HR: 64 ( @ 04:45) (64 - 88)  BP: 138/55 ( @ 04:45) (128/56 - 139/55)  RR: 18 ( @ 04:45) (17 - 18)  SpO2: 96% ( @ 04:45) (92% - 96%)         @ 05:42                    8.6  CBC: 7.23>)-------(<189                     26.1                 141 | 111 | 12    CMP:  ----------------------< 88               3.7 | 28 | 0.68                      Ca:8.6  Phos:2.5  M.8               0.4|      |22        LFTs:  ------|57|-----             -|      |-   @ 16:55                    9.7  CBC: 10.21>)-------(<220                     30.9                 - | - | -    CMP:  ----------------------< -               - | - | -                      Ca:-  Phos:-  Mg:-               -|      |-        LFTs:  ------|-|-----             -|      |-   @ 06:49                    8.1  CBC: 7.49>)-------(<165                     24.9                 143 | 113 | 14    CMP:  ----------------------< 86               3.4 | 26 | 0.76                      Ca:8.7  Phos:2.4  M.7               0.3|      |21        LFTs:  ------|47|-----             -|      |-            Current Inpatient Medications:  acetaminophen     Tablet .. 650 milliGRAM(s) Oral every 6 hours PRN  acetaminophen   IVPB .. 1000 milliGRAM(s) IV Intermittent every 6 hours PRN  atorvastatin 20 milliGRAM(s) Oral at bedtime  dronedarone 400 milliGRAM(s) Oral two times a day  folic acid 1 milliGRAM(s) Oral daily  gabapentin 300 milliGRAM(s) Oral daily  heparin   Injectable 5000 Unit(s) SubCutaneous every 8 hours  levothyroxine 88 MICROGram(s) Oral daily  metoprolol succinate  milliGRAM(s) Oral daily  morphine  - Injectable 2 milliGRAM(s) IV Push every 4 hours PRN  ondansetron Injectable 4 milliGRAM(s) IV Push every 6 hours PRN  pantoprazole  Injectable 40 milliGRAM(s) IV Push two times a day  predniSONE   Tablet 5 milliGRAM(s) Oral daily  raloxifene 60 milliGRAM(s) Oral daily

## 2024-04-30 NOTE — PROGRESS NOTE ADULT - ASSESSMENT
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  hemodynamically stable  s/p EGD no mass seen on EGD  s/p laparoscopic resection of GIST  doing well  rafiq diet      Plan:  cont diet  cont DVT ppx  cont home meds  PT recs: home PT  Protonix  Discharge planning, patient awaiting ride    Plan discussed with attending  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

## 2024-04-30 NOTE — DISCHARGE NOTE PROVIDER - NSDCHHNEEDSERVICE_GEN_ALL_CORE
2 RN skin check complete with Sandra ELIAS  Devices in place- mepilex   Skin assessed under devices : yes   Confirmed pressure ulcers found on: n/a  New potential pressure ulcers noted on : n/a   Wound consult placed: n/a   The following interventions in place:     Q2 turning/repositioning, incontinence care, waffle overlay, mepilex for protection/prevention, barrier cream     Assessment:      Elbows: pink but blanching   Back: some redness noted but blanching, (pillows for support)  Kristy/groin: pink   Heels: boggy, pink but blanching.   Sacrum:pink but blanching    Other, specify...

## 2024-04-30 NOTE — DISCHARGE NOTE PROVIDER - NSDCMRMEDTOKEN_GEN_ALL_CORE_FT
Eliquis 5 mg oral tablet: 1 tab(s) orally 2 times a day  ezetimibe 10 mg oral tablet: 1 tab(s) orally once a day  folic acid 1 mg oral tablet: 1 tab(s) orally once a day  gabapentin 300 mg oral capsule: 1 cap(s) orally once a day (in the afternoon)  metoprolol succinate 100 mg oral tablet, extended release: 1 tab(s) orally once a day  Multaq 400 mg oral tablet: 1 tab(s) orally 2 times a day  Orencia ClickJect 125 mg/mL subcutaneous solution: 125 milligram(s) subcutaneously once a month  pantoprazole 40 mg oral delayed release tablet: 1 tab(s) orally once a day as needed for  indigestion  pravastatin 80 mg oral tablet: 1 tab(s) orally once a day  predniSONE 5 mg oral tablet: 1 tab(s) orally once a day (in the afternoon)  raloxifene 60 mg oral tablet: 1 tab(s) orally once a day  Synthroid 88 mcg (0.088 mg) oral tablet: 1 tab(s) orally once a day  Vitamin C 500 mg oral tablet: 1 tab(s) orally once a day  Vitamin D3 1250 mcg (50,000 intl units) oral capsule: 1 cap(s) orally once a week on Saturdays   Eliquis 5 mg oral tablet: 1 tab(s) orally 2 times a day  ezetimibe 10 mg oral tablet: 1 tab(s) orally once a day  folic acid 1 mg oral tablet: 1 tab(s) orally once a day  gabapentin 300 mg oral capsule: 1 cap(s) orally once a day (in the afternoon)  metoprolol succinate 100 mg oral tablet, extended release: 1 tab(s) orally once a day  MiraLax oral powder for reconstitution: 17 gram(s) orally once a day  Multaq 400 mg oral tablet: 1 tab(s) orally 2 times a day  Orencia ClickJect 125 mg/mL subcutaneous solution: 125 milligram(s) subcutaneously once a month  oxyCODONE 5 mg oral tablet: 1 tab(s) orally every 8 hours MDD: 3  pantoprazole 40 mg oral delayed release tablet: 1 tab(s) orally once a day as needed for  indigestion  pravastatin 80 mg oral tablet: 1 tab(s) orally once a day  predniSONE 5 mg oral tablet: 1 tab(s) orally once a day (in the afternoon)  raloxifene 60 mg oral tablet: 1 tab(s) orally once a day  Synthroid 88 mcg (0.088 mg) oral tablet: 1 tab(s) orally once a day  Vitamin C 500 mg oral tablet: 1 tab(s) orally once a day  Vitamin D3 1250 mcg (50,000 intl units) oral capsule: 1 cap(s) orally once a week on Saturdays

## 2024-04-30 NOTE — DISCHARGE NOTE PROVIDER - NSDCFUADDINST_GEN_ALL_CORE_FT
please avoid heavy activity and lifting up more than 10 pounds for three weeks after surgery atleast,  We will see you in clinic for follow up in 2 weeks, if you have not heard from us please call the number above ,  you can go back to regular diet with soft and bite sized, continue to take your home medications as before including your steroids and anticoagulation  start to take Miralax every day to help with constipation  you can take shower, do not rub or scrub your incisions   Go to emergency room if any sign of: fever chills, drainage and concern for infection on your incisions sites, not tolerating diet, not having bowel movements for more that couple days, having bloody bowel movement or persistent nausea or vomiting

## 2024-04-30 NOTE — DISCHARGE NOTE PROVIDER - NSDCFUSCHEDAPPT_GEN_ALL_CORE_FT
Manuel Martines  [facility]  SURGON[facility] [address]  Scheduled Appointment: 05/15/2024

## 2024-04-30 NOTE — DISCHARGE NOTE PROVIDER - HOSPITAL COURSE
ED presentation:  89 y/o female with PMHx of GERD, arthritis, Afib on Eliquis, loop recorder, HTN presents to the ED c/o severe abdominal pain radiating to her back onset this morning. Additionally reports dark stools, however possible in the setting of taking Pepto-Bismol and ?Magnesia. Patient presented to ED a few weeks ago with similar symptoms, was found to have a mass in the stomach, and was discharged with GI follow up. Pt did not make it to GI for follow up. Denies nausea, vomiting, diarrhea, constipation, chest pain, sob. Allergic to sulfa, Penicillin, and IV contrast.    s/p EGD no mass seen on EGD  s/p laparoscopic resection of GIST  doing well, recovered well, ambulating with PT, PT recommended home PT, nugent out and voiding freely, passign gas and had bowel fuction after surgery, pain controlled, tolerated diet ED presentation:  89 y/o female with PMHx of GERD, arthritis, Afib on Eliquis, loop recorder, HTN presents to the ED c/o severe abdominal pain radiating to her back onset this morning. Additionally reports dark stools, however possible in the setting of taking Pepto-Bismol and ?Magnesia. Patient presented to ED a few weeks ago with similar symptoms, was found to have a mass in the stomach, and was discharged with GI follow up. Pt did not make it to GI for follow up. Denies nausea, vomiting, diarrhea, constipation, chest pain, sob. Allergic to sulfa, Penicillin, and IV contrast.    s/p EGD no mass seen on EGD  s/p laparoscopic resection of GIST  doing well, recovered well, ambulating with PT, PT recommended home PT, [place] out and voiding freely, passing gas and had bowel function after surgery, pain controlled, tolerated diet Continue holding Eliquis until Friday May 3rd.

## 2024-05-01 ENCOUNTER — NON-APPOINTMENT (OUTPATIENT)
Age: 89
End: 2024-05-01

## 2024-05-06 LAB — SURGICAL PATHOLOGY STUDY: SIGNIFICANT CHANGE UP

## 2024-05-09 DIAGNOSIS — E03.9 HYPOTHYROIDISM, UNSPECIFIED: ICD-10-CM

## 2024-05-09 DIAGNOSIS — Z79.69 LONG TERM (CURRENT) USE OF OTHER IMMUNOMODULATORS AND IMMUNOSUPPRESSANTS: ICD-10-CM

## 2024-05-09 DIAGNOSIS — Z87.891 PERSONAL HISTORY OF NICOTINE DEPENDENCE: ICD-10-CM

## 2024-05-09 DIAGNOSIS — K44.9 DIAPHRAGMATIC HERNIA WITHOUT OBSTRUCTION OR GANGRENE: ICD-10-CM

## 2024-05-09 DIAGNOSIS — K66.1 HEMOPERITONEUM: ICD-10-CM

## 2024-05-09 DIAGNOSIS — I10 ESSENTIAL (PRIMARY) HYPERTENSION: ICD-10-CM

## 2024-05-09 DIAGNOSIS — M06.9 RHEUMATOID ARTHRITIS, UNSPECIFIED: ICD-10-CM

## 2024-05-09 DIAGNOSIS — Z79.52 LONG TERM (CURRENT) USE OF SYSTEMIC STEROIDS: ICD-10-CM

## 2024-05-09 DIAGNOSIS — C49.A2 GASTROINTESTINAL STROMAL TUMOR OF STOMACH: ICD-10-CM

## 2024-05-09 DIAGNOSIS — Z88.2 ALLERGY STATUS TO SULFONAMIDES: ICD-10-CM

## 2024-05-09 DIAGNOSIS — Z91.81 HISTORY OF FALLING: ICD-10-CM

## 2024-05-09 DIAGNOSIS — Z91.041 RADIOGRAPHIC DYE ALLERGY STATUS: ICD-10-CM

## 2024-05-09 DIAGNOSIS — E83.39 OTHER DISORDERS OF PHOSPHORUS METABOLISM: ICD-10-CM

## 2024-05-09 DIAGNOSIS — Z88.0 ALLERGY STATUS TO PENICILLIN: ICD-10-CM

## 2024-05-09 DIAGNOSIS — I48.0 PAROXYSMAL ATRIAL FIBRILLATION: ICD-10-CM

## 2024-05-09 DIAGNOSIS — Z79.01 LONG TERM (CURRENT) USE OF ANTICOAGULANTS: ICD-10-CM

## 2024-05-09 DIAGNOSIS — Z90.49 ACQUIRED ABSENCE OF OTHER SPECIFIED PARTS OF DIGESTIVE TRACT: ICD-10-CM

## 2024-05-09 DIAGNOSIS — D84.821 IMMUNODEFICIENCY DUE TO DRUGS: ICD-10-CM

## 2024-05-09 DIAGNOSIS — E83.42 HYPOMAGNESEMIA: ICD-10-CM

## 2024-05-09 DIAGNOSIS — K21.9 GASTRO-ESOPHAGEAL REFLUX DISEASE WITHOUT ESOPHAGITIS: ICD-10-CM

## 2024-05-09 DIAGNOSIS — Z79.82 LONG TERM (CURRENT) USE OF ASPIRIN: ICD-10-CM

## 2024-05-09 DIAGNOSIS — Z79.890 HORMONE REPLACEMENT THERAPY: ICD-10-CM

## 2024-05-15 ENCOUNTER — APPOINTMENT (OUTPATIENT)
Dept: SURGICAL ONCOLOGY | Facility: CLINIC | Age: 89
End: 2024-05-15
Payer: MEDICARE

## 2024-05-15 VITALS
DIASTOLIC BLOOD PRESSURE: 77 MMHG | HEIGHT: 64 IN | RESPIRATION RATE: 18 BRPM | BODY MASS INDEX: 26.98 KG/M2 | WEIGHT: 158 LBS | SYSTOLIC BLOOD PRESSURE: 132 MMHG | OXYGEN SATURATION: 96 % | HEART RATE: 71 BPM

## 2024-05-15 PROCEDURE — 99024 POSTOP FOLLOW-UP VISIT: CPT

## 2024-05-15 NOTE — ASSESSMENT
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 Margins are free. DOG1 and SMA positive . Negative for S100.Tumor size 13.2 x 8.0 x 5.5 cm  I have recommended adjuvant Gleevec. She will be referred to Dr. Xiong.

## 2024-05-15 NOTE — HISTORY OF PRESENT ILLNESS
[FreeTextEntry1] : Ms. Buck presents to the office for first post op visit.  She is a 89 y/o female with PMHx of GERD, arthritis, Afib on Eliquis, loop recorder, HTN. She presented to the ED at  on 4/22 with complains of severe abdominal pain radiating to her back had dark stools. She had presented to ED a few weeks ago prior with similar symptoms, was found to have a mass in the stomach, and was discharged with GI follow up but symptoms got worst and presented to ED again.  During this admission she had CT that showed Increase in size of lobulated mass contiguous with greater curvature of stomach since 4/5/2024 with newly evident internal hyperdensity compatible with hemorrhage. Newly evident hyperdense left lateral kali splenic fluid compatible with hemorrhage. Newly evident hyperdense fluid in posterior pelvis compatible with hemorrhage. She had EGD to localize the mass, but no mass seen on EGD. She underwent laparoscopic resection of the GIST ion 4/25/24. She was discharged home with home PT on 4/30/24. She reports feeling fair, she is unbale to participate fully with home PT due to having decreased mobility and having pain. She will follow up with her rheumatologist Dr. Enriquez to restart infusions for RA. She was on orencia   Pathology results discussed with the patient and written report given to patient.

## 2024-05-15 NOTE — REVIEW OF SYSTEMS
[Negative] : Psychiatric [de-identified] : back pain, walks with a cane [de-identified] : lap sites and midline incision healing well

## 2024-05-15 NOTE — REASON FOR VISIT
[Family Member] : family member [de-identified] : s/p laparoscopic resection of GIST [de-identified] : 4/25/24 [de-identified] : 21

## 2024-05-15 NOTE — PHYSICAL EXAM
[Normal] : oriented to person, place and time, with appropriate affect [de-identified] : hx of RA back pain [de-identified] : midline incision and lap sites healed well

## 2024-05-17 ENCOUNTER — OUTPATIENT (OUTPATIENT)
Dept: OUTPATIENT SERVICES | Facility: HOSPITAL | Age: 89
LOS: 1 days | Discharge: HOME | End: 2024-05-17

## 2024-05-17 DIAGNOSIS — Z90.49 ACQUIRED ABSENCE OF OTHER SPECIFIED PARTS OF DIGESTIVE TRACT: Chronic | ICD-10-CM

## 2024-05-17 DIAGNOSIS — H25.093 OTHER AGE-RELATED INCIPIENT CATARACT, BILATERAL: Chronic | ICD-10-CM

## 2024-05-17 DIAGNOSIS — C49.A0 GASTROINTESTINAL STROMAL TUMOR, UNSPECIFIED SITE: ICD-10-CM

## 2024-05-20 ENCOUNTER — RESULT REVIEW (OUTPATIENT)
Age: 89
End: 2024-05-20

## 2024-05-20 ENCOUNTER — NON-APPOINTMENT (OUTPATIENT)
Age: 89
End: 2024-05-20

## 2024-05-20 ENCOUNTER — APPOINTMENT (OUTPATIENT)
Dept: HEMATOLOGY ONCOLOGY | Facility: CLINIC | Age: 89
End: 2024-05-20
Payer: MEDICARE

## 2024-05-20 VITALS
HEART RATE: 69 BPM | WEIGHT: 155 LBS | BODY MASS INDEX: 26.46 KG/M2 | OXYGEN SATURATION: 95 % | SYSTOLIC BLOOD PRESSURE: 121 MMHG | TEMPERATURE: 97.6 F | DIASTOLIC BLOOD PRESSURE: 60 MMHG | HEIGHT: 64 IN

## 2024-05-20 LAB
BASOPHILS # BLD AUTO: 0.04 K/UL — SIGNIFICANT CHANGE UP (ref 0–0.2)
BASOPHILS NFR BLD AUTO: 0.4 % — SIGNIFICANT CHANGE UP (ref 0–2)
EOSINOPHIL # BLD AUTO: 0.23 K/UL — SIGNIFICANT CHANGE UP (ref 0–0.5)
EOSINOPHIL NFR BLD AUTO: 2.4 % — SIGNIFICANT CHANGE UP (ref 0–6)
HCT VFR BLD CALC: 34.9 % — SIGNIFICANT CHANGE UP (ref 34.5–45)
HGB BLD-MCNC: 11.5 G/DL — SIGNIFICANT CHANGE UP (ref 11.5–15.5)
IMM GRANULOCYTES NFR BLD AUTO: 0.3 % — SIGNIFICANT CHANGE UP (ref 0–0.9)
LYMPHOCYTES # BLD AUTO: 2.16 K/UL — SIGNIFICANT CHANGE UP (ref 1–3.3)
LYMPHOCYTES # BLD AUTO: 22.5 % — SIGNIFICANT CHANGE UP (ref 13–44)
MCHC RBC-ENTMCNC: 30 PG — SIGNIFICANT CHANGE UP (ref 27–34)
MCHC RBC-ENTMCNC: 33 GM/DL — SIGNIFICANT CHANGE UP (ref 32–36)
MCV RBC AUTO: 91.1 FL — SIGNIFICANT CHANGE UP (ref 80–100)
MONOCYTES # BLD AUTO: 0.62 K/UL — SIGNIFICANT CHANGE UP (ref 0–0.9)
MONOCYTES NFR BLD AUTO: 6.4 % — SIGNIFICANT CHANGE UP (ref 2–14)
NEUTROPHILS # BLD AUTO: 6.54 K/UL — SIGNIFICANT CHANGE UP (ref 1.8–7.4)
NEUTROPHILS NFR BLD AUTO: 68 % — SIGNIFICANT CHANGE UP (ref 43–77)
NRBC # BLD: 0 /100 WBCS — SIGNIFICANT CHANGE UP (ref 0–0)
PLATELET # BLD AUTO: 180 K/UL — SIGNIFICANT CHANGE UP (ref 150–400)
RBC # BLD: 3.83 M/UL — SIGNIFICANT CHANGE UP (ref 3.8–5.2)
RBC # FLD: 13.3 % — SIGNIFICANT CHANGE UP (ref 10.3–14.5)
WBC # BLD: 9.62 K/UL — SIGNIFICANT CHANGE UP (ref 3.8–10.5)
WBC # FLD AUTO: 9.62 K/UL — SIGNIFICANT CHANGE UP (ref 3.8–10.5)

## 2024-05-20 PROCEDURE — G2211 COMPLEX E/M VISIT ADD ON: CPT

## 2024-05-20 PROCEDURE — 99205 OFFICE O/P NEW HI 60 MIN: CPT

## 2024-05-20 PROCEDURE — G2212 PROLONG OUTPT/OFFICE VIS: CPT

## 2024-05-20 RX ORDER — ESTRADIOL 0.1 MG/G
0.1 CREAM VAGINAL
Qty: 1 | Refills: 3 | Status: DISCONTINUED | COMMUNITY
Start: 2023-04-25 | End: 2024-05-20

## 2024-05-20 RX ORDER — AMLODIPINE BESYLATE 5 MG/1
TABLET ORAL
Refills: 0 | Status: DISCONTINUED | COMMUNITY
End: 2024-05-20

## 2024-05-20 RX ORDER — ABATACEPT 250 MG/15ML
250 INJECTION, POWDER, LYOPHILIZED, FOR SOLUTION INTRAVENOUS
Refills: 0 | Status: ACTIVE | COMMUNITY
Start: 2024-05-20

## 2024-05-20 RX ORDER — FAMOTIDINE 20 MG/1
20 TABLET, FILM COATED ORAL
Refills: 0 | Status: DISCONTINUED | COMMUNITY
End: 2024-05-20

## 2024-05-20 RX ORDER — LEVOTHYROXINE SODIUM 0.09 MG/1
88 TABLET ORAL
Refills: 0 | Status: ACTIVE | COMMUNITY
Start: 2017-06-25

## 2024-05-20 RX ORDER — BETAMETHASONE DIPROPIONATE 0.5 MG/G
0.05 CREAM, AUGMENTED TOPICAL
Qty: 1 | Refills: 2 | Status: DISCONTINUED | COMMUNITY
Start: 2022-06-03 | End: 2024-05-20

## 2024-05-20 NOTE — PHYSICAL EXAM
[Restricted in physically strenuous activity but ambulatory and able to carry out work of a light or sedentary nature] : Status 1- Restricted in physically strenuous activity but ambulatory and able to carry out work of a light or sedentary nature, e.g., light house work, office work [Normal] : affect appropriate [de-identified] : wt loss 5 lbs in 1 month [de-identified] : well healed laparoscopic sites

## 2024-05-20 NOTE — PHYSICAL EXAM
[Restricted in physically strenuous activity but ambulatory and able to carry out work of a light or sedentary nature] : Status 1- Restricted in physically strenuous activity but ambulatory and able to carry out work of a light or sedentary nature, e.g., light house work, office work [Normal] : affect appropriate [de-identified] : wt loss 5 lbs in 1 month [de-identified] : well healed laparoscopic sites

## 2024-05-20 NOTE — HISTORY OF PRESENT ILLNESS
[de-identified] : The patient was diagnosed with GIST in April 2024 at the age of 89. She was seen in  ER on 4/5/24 with abdominal pain and a CT A/P showed left upper quadrant soft tissue mass (9.6 x 5.1 cm) which may be exophytic from the lesser curvature of the stomach suggestive of GIST. She was discharged and was to follow up with GI, she did not make a GI for follow up apptointment. On 4/22/24 she re-presented to  ER with severe abdominal pain radiating to her back x 1 day, and a CT A/P showed an increase in size of lobulated mass contiguous with greater curvature of stomach since 4/5/2024 with newly evident internal hyperdensity compatible with hemorrhage. Newly evident hyperdense left lateral perisplenic fluid compatible with hemorrhage. Newly evident hyperdense fluid in posterior pelvis compatible with hemorrhage. On 4/23/24 she underwent an EUS with normal findings. on 4/25/24 she is laparoscopic resection of GIST and pathology showed gastrointestinal stromal tumor (GIST) (pT4, pN0). All margins are free. Two (2) benign lymph nodes. Omentum, adherent to serosal surface with acute and chronic inflammation, necrosis and congestion. The neoplastic cells are immunoreactive for DOG1, SMA, negative for S100.  The immunostain profile supports the above diagnosis. pT Category: pT4 pN0. Immunohistochemical Studies: DOG1 (ANO1): Positive. [de-identified] : Medical Hx: GERD, arthritis, Afib on Eliquis, loop recorder (active), HTN; hypothyroidism; HL; RA on Orencia infusions  Surgical Hx: intracranial aneurysm repair 2004; cataract sx b/l; R breast biopsy; tonsillectomy  Family Hx: daughter breast ca; maternal grandfather melanoma  Social Hx: quit smoking 20 years ago, 1 PPD for 15 years; ETOH socially; son lives upstairs, other family in [facility] / [place]; retired worked for [employer] [de-identified] : Today she feels fatigues since surgery She feels full quickly. Wt loss 5 lbs in 1 month. Also she is not being treated for for her RA, monthly Orencia infusions. She denies pain, vomiting. Well healed laparoscopic sites. See full review of systems below.

## 2024-05-20 NOTE — HISTORY OF PRESENT ILLNESS
[de-identified] : The patient was diagnosed with GIST in April 2024 at the age of 89. She was seen in  ER on 4/5/24 with abdominal pain and a CT A/P showed left upper quadrant soft tissue mass (9.6 x 5.1 cm) which may be exophytic from the lesser curvature of the stomach suggestive of GIST. She was discharged and was to follow up with GI, she did not make a GI for follow up apptointment. On 4/22/24 she re-presented to  ER with severe abdominal pain radiating to her back x 1 day, and a CT A/P showed an increase in size of lobulated mass contiguous with greater curvature of stomach since 4/5/2024 with newly evident internal hyperdensity compatible with hemorrhage. Newly evident hyperdense left lateral perisplenic fluid compatible with hemorrhage. Newly evident hyperdense fluid in posterior pelvis compatible with hemorrhage. On 4/23/24 she underwent an EUS with normal findings. on 4/25/24 she is laparoscopic resection of GIST and pathology showed gastrointestinal stromal tumor (GIST) (pT4, pN0). All margins are free. Two (2) benign lymph nodes. Omentum, adherent to serosal surface with acute and chronic inflammation, necrosis and congestion. The neoplastic cells are immunoreactive for DOG1, SMA, negative for S100.  The immunostain profile supports the above diagnosis. pT Category: pT4 pN0. Immunohistochemical Studies: DOG1 (ANO1): Positive. [de-identified] : Medical Hx: GERD, arthritis, Afib on Eliquis, loop recorder (active), HTN; hypothyroidism; HL; RA on Orencia infusions  Surgical Hx: intracranial aneurysm repair 2004; cataract sx b/l; R breast biopsy; tonsillectomy  Family Hx: daughter breast ca; maternal grandfather melanoma  Social Hx: quit smoking 20 years ago, 1 PPD for 15 years; ETOH socially; son lives upstairs, other family in [facility] / [place]; retired worked for [employer] [de-identified] : Today she feels fatigues since surgery She feels full quickly. Wt loss 5 lbs in 1 month. Also she is not being treated for for her RA, monthly Orencia infusions. She denies pain, vomiting. Well healed laparoscopic sites. See full review of systems below.

## 2024-05-20 NOTE — GOALS
[Patient] : patient [Staff: _____] : staff - [unfilled] [FreeTextEntry1] : Anson Buck [FreeTextEntry2] : son [FreeTextEntry3] : [phone] [FreeTextEntry7] : Patient states she doesn't know where her copy of her HCP is.  New form given to patient.

## 2024-05-20 NOTE — GOALS
[Patient] : patient [Staff: _____] : staff - [unfilled] [FreeTextEntry1] : Anson Buck [FreeTextEntry2] : son [FreeTextEntry3] : [phone] [FreeTextEntry7] : Patient states she doesn't know where her copy of her HCP is.  New form given to patient.

## 2024-05-20 NOTE — RESULTS/DATA
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 All margins are free. Two (2) benign lymph nodes. Omentum, adherent to serosal surface with acute and chronic inflammation, necrosis and congestion. The neoplastic cells are immunoreactive for DOG1, SMA, negative for S100.  The immunostain profile supports the above diagnosis. Synoptic Summary GIST - Resection Associated Syndrome: Not specified Preresection Treatment: No known preresection therapy Procedure: Resection - laproscopic resection Tumor Focality: Unifocal Tumor Site: Stomach - greater curvature Histologic Type: Gastrointestinal stromal tumor, spindle cell type Tumor Size: 13.2 x 8.0 x 5.5 Centimeters (cm) Mitotic Rate: 7 mitoses per 5 mm2 Histologic Grade: G2, high grade Necrosis: Present Extent of Necrosis: 15% Treatment Effect: No known presurgical therapy Risk Assessment: High risk Margin Status: All margins negative for GIST Closest Margin(s) to GIST: Omental (radial) Distance from GIST to Closest Margin: 0.5 cm Regional Lymph Node Status: All regional lymph nodes negative for tumor Number of Lymph Nodes Examined: 2 pTNM Classification pT Category: pT4 pN Category: pN0 Special Studies DOG1 (ANO1) Immunohistochemical Studies: DOG1 (ANO1):  Positive Molecular Genetic Studies:  Not performed  4/23/24 EUS: Normal esophagus. Z-line regular, 32 cm from the incisors. 1 cm hiatal hernia. No gross lesions in the stomach. Normal duodenal bulb, second portion of the duodenum and third portion of the duodenum.  4/22/24 CT A/P: Increase in size of lobulated mass contiguous with greater curvature of stomach since 4/5/2024 with newly evident internal hyperdensity compatible with hemorrhage. Newly evident hyperdense left lateral perisplenic fluid compatible with hemorrhage. Newly evident hyperdense fluid in posterior pelvis compatible with hemorrhage.  4/5/24 CT A/P: Left upper quadrant soft tissue mass (9.6 x 5.1 cm) which may be exophytic from the lesser curvature of the stomach suggestive of GIST.

## 2024-05-20 NOTE — REVIEW OF SYSTEMS
[Recent Change In Weight] : ~T recent weight change [Diarrhea: Grade 0] : Diarrhea: Grade 0 [Negative] : Allergic/Immunologic [Chest Pain] : no chest pain [Shortness Of Breath] : no shortness of breath [Abdominal Pain] : no abdominal pain [Vomiting] : no vomiting [FreeTextEntry2] : wt loss 5 lbs  [de-identified] : well healed laproscopic sites  [FreeTextEntry5] : a-fib

## 2024-05-20 NOTE — CONSULT LETTER
[Dear  ___] : Dear  [unfilled], [Consult Letter:] : I had the pleasure of evaluating your patient, [unfilled]. [Please see my note below.] : Please see my note below. [Consult Closing:] : Thank you very much for allowing me to participate in the care of this patient.  If you have any questions, please do not hesitate to contact me. [Sincerely,] : Sincerely, [DrMaren  ___] : Dr. CURIEL [DrMaren ___] : Dr. CURIEL [FreeTextEntry3] : Dr. Lexy Xiong

## 2024-05-20 NOTE — REVIEW OF SYSTEMS
[Recent Change In Weight] : ~T recent weight change [Diarrhea: Grade 0] : Diarrhea: Grade 0 [Negative] : Allergic/Immunologic [Chest Pain] : no chest pain [Shortness Of Breath] : no shortness of breath [Abdominal Pain] : no abdominal pain [Vomiting] : no vomiting [FreeTextEntry2] : wt loss 5 lbs  [FreeTextEntry5] : a-fib  [de-identified] : well healed laproscopic sites

## 2024-05-21 LAB
ALBUMIN SERPL ELPH-MCNC: 4.1 G/DL — SIGNIFICANT CHANGE UP (ref 3.3–5)
ALP SERPL-CCNC: 73 U/L — SIGNIFICANT CHANGE UP (ref 40–120)
ALT FLD-CCNC: 7 U/L — LOW (ref 10–45)
ANION GAP SERPL CALC-SCNC: 13 MMOL/L — SIGNIFICANT CHANGE UP (ref 5–17)
AST SERPL-CCNC: 14 U/L — SIGNIFICANT CHANGE UP (ref 10–40)
BILIRUB SERPL-MCNC: 0.3 MG/DL — SIGNIFICANT CHANGE UP (ref 0.2–1.2)
BUN SERPL-MCNC: 18 MG/DL — SIGNIFICANT CHANGE UP (ref 7–23)
CALCIUM SERPL-MCNC: 9.8 MG/DL — SIGNIFICANT CHANGE UP (ref 8.4–10.5)
CHLORIDE SERPL-SCNC: 106 MMOL/L — SIGNIFICANT CHANGE UP (ref 96–108)
CO2 SERPL-SCNC: 24 MMOL/L — SIGNIFICANT CHANGE UP (ref 22–31)
CREAT SERPL-MCNC: 1.04 MG/DL — SIGNIFICANT CHANGE UP (ref 0.5–1.3)
EGFR: 52 ML/MIN/1.73M2 — LOW
GLUCOSE SERPL-MCNC: 85 MG/DL — SIGNIFICANT CHANGE UP (ref 70–99)
HAV IGM SER-ACNC: SIGNIFICANT CHANGE UP
HBV CORE IGM SER-ACNC: SIGNIFICANT CHANGE UP
HBV SURFACE AG SER-ACNC: SIGNIFICANT CHANGE UP
HCV AB S/CO SERPL IA: 0.09 S/CO — SIGNIFICANT CHANGE UP (ref 0–0.99)
HCV AB SERPL-IMP: SIGNIFICANT CHANGE UP
INR BLD: 1.68 RATIO — HIGH (ref 0.85–1.18)
MAGNESIUM SERPL-MCNC: 1.9 MG/DL — SIGNIFICANT CHANGE UP (ref 1.6–2.6)
POTASSIUM SERPL-MCNC: 4.7 MMOL/L — SIGNIFICANT CHANGE UP (ref 3.5–5.3)
POTASSIUM SERPL-SCNC: 4.7 MMOL/L — SIGNIFICANT CHANGE UP (ref 3.5–5.3)
PROT SERPL-MCNC: 6.6 G/DL — SIGNIFICANT CHANGE UP (ref 6–8.3)
PROTHROM AB SERPL-ACNC: 18.9 SEC — HIGH (ref 9.5–13)
SODIUM SERPL-SCNC: 143 MMOL/L — SIGNIFICANT CHANGE UP (ref 135–145)

## 2024-05-22 DIAGNOSIS — C49.A3 GASTROINTESTINAL STROMAL TUMOR OF SMALL INTESTINE: ICD-10-CM

## 2024-05-28 ENCOUNTER — NON-APPOINTMENT (OUTPATIENT)
Age: 89
End: 2024-05-28

## 2024-05-28 RX ORDER — METOCLOPRAMIDE 5 MG/1
5 TABLET ORAL EVERY 8 HOURS
Qty: 42 | Refills: 0 | Status: ACTIVE | COMMUNITY
Start: 2024-05-28 | End: 1900-01-01

## 2024-06-03 ENCOUNTER — APPOINTMENT (OUTPATIENT)
Dept: SURGICAL ONCOLOGY | Facility: CLINIC | Age: 89
End: 2024-06-03
Payer: MEDICARE

## 2024-06-03 VITALS
HEART RATE: 66 BPM | TEMPERATURE: 97.8 F | SYSTOLIC BLOOD PRESSURE: 120 MMHG | HEIGHT: 64 IN | WEIGHT: 152 LBS | DIASTOLIC BLOOD PRESSURE: 80 MMHG | BODY MASS INDEX: 25.95 KG/M2 | OXYGEN SATURATION: 99 %

## 2024-06-03 DIAGNOSIS — C49.A2 GASTROINTESTINAL STROMAL TUMOR OF STOMACH: ICD-10-CM

## 2024-06-03 PROCEDURE — 99024 POSTOP FOLLOW-UP VISIT: CPT

## 2024-06-03 NOTE — ASSESSMENT
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 Buck appears to be recovering quite well from her surgery.  Her symptoms are not unexpected. She will continue on Reglan and Metoprolol for one month. We discussed the need for adjuvant Gleevec given the ruptured nature of her tumor along with the size and high-grade.  She has already seen Dr. Xiong and will see me back in 6 months or sooner should the need arise.

## 2024-06-03 NOTE — REASON FOR VISIT
[de-identified] : 6 [de-identified] : Laparoscopic partial gastrectomy [de-identified] : 4/25/24

## 2024-06-03 NOTE — HISTORY OF PRESENT ILLNESS
[FreeTextEntry1] : Mrs Buck Comes in for postop visit following partial gastrectomy for treatment of a large ruptured gastrointestinal stromal tumor.  She had a complete gross resection and I have reviewed the pathology report with her.  Since surgery she complains of occasional left upper quadrant crampy-like pain which occurs typically at night.  She is not sure how long it lasts but it is resolved without medication.  She has been afebrile she denies any nausea or vomiting.  But she did does admit to heartburn and early satiety.  We started her on Reglan which seems to have improved the symptoms.  She is otherwise doing quite well and is returning to her normal activities.

## 2024-06-03 NOTE — PHYSICAL EXAM
[Normal] : full range of motion and no deformities appreciated [de-identified] : Incisions are all healing nicely without erythema or swelling.

## 2024-06-03 NOTE — REASON FOR VISIT
[de-identified] : 6 [de-identified] : Laparoscopic partial gastrectomy [de-identified] : 4/25/24

## 2024-06-03 NOTE — CONSULT LETTER
[Dear  ___] : Dear  [unfilled], [Courtesy Letter:] : I had the pleasure of seeing your patient, [unfilled], in my office today. [Please see my note below.] : Please see my note below. [Sincerely,] : Sincerely, [FreeTextEntry1] : She is approximately 2 weeks status post a laparoscopic partial partial gastrectomy to remove a large ruptured gastrointestinal stromal tumor I given her a copy of the pathology report for your files which she will give to you when she sees you.  I did not remove a lot of her stomach so she should be able to eat normally given the ruptured nature of her tumor she will need to be on Gleevac for a minimum of 3 years.  She had questions about drug interactions with her immunotherapy for treatment of her arthritis.  I do not think there is a problem but I did ask her to confirm with Dr. Quigley who was administering her Gleevac.  If you have any questions in the future, please do not hesitate to contact me.

## 2024-06-10 ENCOUNTER — APPOINTMENT (OUTPATIENT)
Dept: HEMATOLOGY ONCOLOGY | Facility: CLINIC | Age: 89
End: 2024-06-10
Payer: MEDICARE

## 2024-06-10 VITALS
DIASTOLIC BLOOD PRESSURE: 73 MMHG | SYSTOLIC BLOOD PRESSURE: 123 MMHG | RESPIRATION RATE: 16 BRPM | OXYGEN SATURATION: 97 % | HEIGHT: 64 IN | TEMPERATURE: 98.3 F | HEART RATE: 61 BPM | BODY MASS INDEX: 26.8 KG/M2 | WEIGHT: 157 LBS

## 2024-06-10 DIAGNOSIS — C49.A3 GASTROINTESTINAL STROMAL TUMOR OF SMALL INTESTINE: ICD-10-CM

## 2024-06-10 PROCEDURE — G2211 COMPLEX E/M VISIT ADD ON: CPT

## 2024-06-10 PROCEDURE — 99215 OFFICE O/P EST HI 40 MIN: CPT

## 2024-06-10 RX ORDER — PROCHLORPERAZINE MALEATE 10 MG/1
10 TABLET ORAL
Qty: 120 | Refills: 6 | Status: ACTIVE | COMMUNITY
Start: 2024-06-10 | End: 1900-01-01

## 2024-06-10 RX ORDER — ONDANSETRON 8 MG/1
8 TABLET, ORALLY DISINTEGRATING ORAL
Qty: 90 | Refills: 3 | Status: ACTIVE | COMMUNITY
Start: 2024-06-10 | End: 1900-01-01

## 2024-06-10 RX ORDER — IMATINIB MESYLATE 400 MG/1
400 TABLET, FILM COATED ORAL
Qty: 30 | Refills: 0 | Status: ACTIVE | COMMUNITY
Start: 2024-06-10

## 2024-06-10 NOTE — PHYSICAL EXAM
[Restricted in physically strenuous activity but ambulatory and able to carry out work of a light or sedentary nature] : Status 1- Restricted in physically strenuous activity but ambulatory and able to carry out work of a light or sedentary nature, e.g., light house work, office work [Normal] : affect appropriate [de-identified] : wt loss 5 lbs in 1 month [de-identified] : well healed laparoscopic sites

## 2024-06-10 NOTE — HISTORY OF PRESENT ILLNESS
[de-identified] : The patient was diagnosed with GIST in April 2024 at the age of 89. She was seen in  ER on 4/5/24 with abdominal pain and a CT A/P showed left upper quadrant soft tissue mass (9.6 x 5.1 cm) which may be exophytic from the lesser curvature of the stomach suggestive of GIST. She was discharged and was to follow up with GI, she did not make a GI for follow up apptointment. On 4/22/24 she re-presented to  ER with severe abdominal pain radiating to her back x 1 day, and a CT A/P showed an increase in size of lobulated mass contiguous with greater curvature of stomach since 4/5/2024 with newly evident internal hyperdensity compatible with hemorrhage. Newly evident hyperdense left lateral perisplenic fluid compatible with hemorrhage. Newly evident hyperdense fluid in posterior pelvis compatible with hemorrhage. On 4/23/24 she underwent an EUS with normal findings. on 4/25/24 she is laparoscopic resection of GIST and pathology showed gastrointestinal stromal tumor (GIST) (pT4, pN0). All margins are free. Two (2) benign lymph nodes. Omentum, adherent to serosal surface with acute and chronic inflammation, necrosis and congestion. The neoplastic cells are immunoreactive for DOG1, SMA, negative for S100.  The immunostain profile supports the above diagnosis. pT Category: pT4 pN0. Immunohistochemical Studies: DOG1 (ANO1): Positive. [de-identified] : Medical Hx: GERD, arthritis, Afib on Eliquis, loop recorder (active), HTN; hypothyroidism; HL; RA on Orencia infusions  Surgical Hx: intracranial aneurysm repair 2004; cataract sx b/l; R breast biopsy; tonsillectomy  Family Hx: daughter breast ca; maternal grandfather melanoma  Social Hx: quit smoking 20 years ago, 1 PPD for 15 years; ETOH socially; son lives upstairs, other family in [facility] / [place]; retired worked for [employer] [de-identified] : Today she feels fatigues since surgery She feels full quickly. Wt loss 5 lbs in 1 month. Also she is not being treated for for her RA, monthly Orencia infusions. She denies pain, vomiting. Well healed laparoscopic sites. See full review of systems below.

## 2024-06-10 NOTE — REVIEW OF SYSTEMS
[Recent Change In Weight] : ~T recent weight change [Diarrhea: Grade 0] : Diarrhea: Grade 0 [Negative] : Allergic/Immunologic [Chest Pain] : no chest pain [Shortness Of Breath] : no shortness of breath [Abdominal Pain] : no abdominal pain [Vomiting] : no vomiting [FreeTextEntry2] : wt loss 5 lbs  [FreeTextEntry5] : a-fib  [de-identified] : well healed laproscopic sites

## 2024-06-18 ENCOUNTER — APPOINTMENT (OUTPATIENT)
Dept: VASCULAR SURGERY | Facility: CLINIC | Age: 89
End: 2024-06-18
Payer: MEDICARE

## 2024-06-18 VITALS
HEIGHT: 63 IN | WEIGHT: 157 LBS | SYSTOLIC BLOOD PRESSURE: 126 MMHG | BODY MASS INDEX: 27.82 KG/M2 | OXYGEN SATURATION: 98 % | HEART RATE: 60 BPM | DIASTOLIC BLOOD PRESSURE: 65 MMHG

## 2024-06-18 DIAGNOSIS — R60.0 LOCALIZED EDEMA: ICD-10-CM

## 2024-06-18 PROCEDURE — 93970 EXTREMITY STUDY: CPT

## 2024-06-18 PROCEDURE — 99213 OFFICE O/P EST LOW 20 MIN: CPT

## 2024-06-18 NOTE — PHYSICAL EXAM
[Normal Rate and Rhythm] : normal rate and rhythm [2+] : left 2+ [Ankle Swelling (On Exam)] : present [Ankle Swelling Bilaterally] : bilaterally  [Ankle Swelling On The Left] : moderate [Varicose Veins Of Lower Extremities] : not present [] : not present [No Rash or Lesion] : No rash or lesion [Alert] : alert [Oriented to Person] : oriented to person [Oriented to Place] : oriented to place [Oriented to Time] : oriented to time [Calm] : calm [de-identified] : NAD [de-identified] : WNL

## 2024-06-18 NOTE — ASSESSMENT
[FreeTextEntry1] : 89F presents with bilateral lower extremity edema. VI studies show mild reflux on RLE, however patients symptoms are worse on the L. - Given degree of reflux I do not think it would cause significant relief of symptoms to treat reflux - Patient does not want treatment at this time - To follow up with cardiologist for echo - Instructed to wear compression stockings and elevate legs when able

## 2024-06-18 NOTE — HISTORY OF PRESENT ILLNESS
[FreeTextEntry1] : 89F presents with leg swelling. Currently wearing compression stockings. Has tried pneumatic pumps and states that she cannot wear them. No claudication, rest pain or wounds.

## 2024-06-20 ENCOUNTER — NON-APPOINTMENT (OUTPATIENT)
Age: 89
End: 2024-06-20

## 2024-08-03 ENCOUNTER — EMERGENCY (EMERGENCY)
Facility: HOSPITAL | Age: 89
LOS: 0 days | Discharge: HOME | End: 2024-08-03
Attending: EMERGENCY MEDICINE
Payer: MEDICARE

## 2024-08-03 VITALS
HEART RATE: 54 BPM | OXYGEN SATURATION: 97 % | DIASTOLIC BLOOD PRESSURE: 62 MMHG | SYSTOLIC BLOOD PRESSURE: 141 MMHG | RESPIRATION RATE: 16 BRPM | TEMPERATURE: 98 F

## 2024-08-03 VITALS — HEIGHT: 64 IN

## 2024-08-03 DIAGNOSIS — Z90.49 ACQUIRED ABSENCE OF OTHER SPECIFIED PARTS OF DIGESTIVE TRACT: ICD-10-CM

## 2024-08-03 DIAGNOSIS — H25.093 OTHER AGE-RELATED INCIPIENT CATARACT, BILATERAL: Chronic | ICD-10-CM

## 2024-08-03 DIAGNOSIS — Z88.2 ALLERGY STATUS TO SULFONAMIDES: ICD-10-CM

## 2024-08-03 DIAGNOSIS — R35.81 NOCTURNAL POLYURIA: ICD-10-CM

## 2024-08-03 DIAGNOSIS — K21.9 GASTRO-ESOPHAGEAL REFLUX DISEASE WITHOUT ESOPHAGITIS: ICD-10-CM

## 2024-08-03 DIAGNOSIS — M54.50 LOW BACK PAIN, UNSPECIFIED: ICD-10-CM

## 2024-08-03 DIAGNOSIS — Z79.01 LONG TERM (CURRENT) USE OF ANTICOAGULANTS: ICD-10-CM

## 2024-08-03 DIAGNOSIS — I10 ESSENTIAL (PRIMARY) HYPERTENSION: ICD-10-CM

## 2024-08-03 DIAGNOSIS — M06.9 RHEUMATOID ARTHRITIS, UNSPECIFIED: ICD-10-CM

## 2024-08-03 DIAGNOSIS — Z90.49 ACQUIRED ABSENCE OF OTHER SPECIFIED PARTS OF DIGESTIVE TRACT: Chronic | ICD-10-CM

## 2024-08-03 DIAGNOSIS — M54.9 DORSALGIA, UNSPECIFIED: ICD-10-CM

## 2024-08-03 DIAGNOSIS — Z88.0 ALLERGY STATUS TO PENICILLIN: ICD-10-CM

## 2024-08-03 DIAGNOSIS — I48.91 UNSPECIFIED ATRIAL FIBRILLATION: ICD-10-CM

## 2024-08-03 LAB
APPEARANCE UR: CLEAR — SIGNIFICANT CHANGE UP
BACTERIA # UR AUTO: NEGATIVE /HPF — SIGNIFICANT CHANGE UP
BILIRUB UR-MCNC: NEGATIVE — SIGNIFICANT CHANGE UP
CAST: 1 /LPF — SIGNIFICANT CHANGE UP (ref 0–4)
COLOR SPEC: YELLOW — SIGNIFICANT CHANGE UP
DIFF PNL FLD: NEGATIVE — SIGNIFICANT CHANGE UP
GLUCOSE BLDC GLUCOMTR-MCNC: 66 MG/DL — LOW (ref 70–99)
GLUCOSE UR QL: 100 MG/DL
KETONES UR-MCNC: NEGATIVE MG/DL — SIGNIFICANT CHANGE UP
LEUKOCYTE ESTERASE UR-ACNC: NEGATIVE — SIGNIFICANT CHANGE UP
NITRITE UR-MCNC: NEGATIVE — SIGNIFICANT CHANGE UP
PH UR: 6 — SIGNIFICANT CHANGE UP (ref 5–8)
PROT UR-MCNC: 30 MG/DL
RBC CASTS # UR COMP ASSIST: 0 /HPF — SIGNIFICANT CHANGE UP (ref 0–4)
SP GR SPEC: 1.01 — SIGNIFICANT CHANGE UP (ref 1–1.03)
SQUAMOUS # UR AUTO: 1 /HPF — SIGNIFICANT CHANGE UP (ref 0–5)
UROBILINOGEN FLD QL: 0.2 MG/DL — SIGNIFICANT CHANGE UP (ref 0.2–1)
WBC UR QL: 3 /HPF — SIGNIFICANT CHANGE UP (ref 0–5)

## 2024-08-03 PROCEDURE — 99283 EMERGENCY DEPT VISIT LOW MDM: CPT

## 2024-08-03 PROCEDURE — 82962 GLUCOSE BLOOD TEST: CPT

## 2024-08-03 PROCEDURE — 99284 EMERGENCY DEPT VISIT MOD MDM: CPT

## 2024-08-03 PROCEDURE — 81001 URINALYSIS AUTO W/SCOPE: CPT

## 2024-08-03 RX ORDER — ACETAMINOPHEN 500 MG/5ML
650 LIQUID (ML) ORAL ONCE
Refills: 0 | Status: COMPLETED | OUTPATIENT
Start: 2024-08-03 | End: 2024-08-03

## 2024-08-03 RX ADMIN — Medication 650 MILLIGRAM(S): at 11:29

## 2024-08-23 ENCOUNTER — EMERGENCY (EMERGENCY)
Facility: HOSPITAL | Age: 89
LOS: 0 days | Discharge: HOME | End: 2024-08-24
Attending: FAMILY MEDICINE
Payer: MEDICARE

## 2024-08-23 VITALS — WEIGHT: 166.89 LBS | HEIGHT: 64 IN

## 2024-08-23 DIAGNOSIS — H25.093 OTHER AGE-RELATED INCIPIENT CATARACT, BILATERAL: Chronic | ICD-10-CM

## 2024-08-23 DIAGNOSIS — Z90.49 ACQUIRED ABSENCE OF OTHER SPECIFIED PARTS OF DIGESTIVE TRACT: Chronic | ICD-10-CM

## 2024-08-23 LAB
ALBUMIN SERPL ELPH-MCNC: 2.4 G/DL — LOW (ref 3.3–5)
ALP SERPL-CCNC: 81 U/L — SIGNIFICANT CHANGE UP (ref 40–120)
ALT FLD-CCNC: 47 U/L — SIGNIFICANT CHANGE UP (ref 12–78)
ANION GAP SERPL CALC-SCNC: 5 MMOL/L — SIGNIFICANT CHANGE UP (ref 5–17)
APPEARANCE UR: CLEAR — SIGNIFICANT CHANGE UP
APTT BLD: 30.4 SEC — SIGNIFICANT CHANGE UP (ref 24.5–35.6)
AST SERPL-CCNC: 49 U/L — HIGH (ref 15–37)
BACTERIA # UR AUTO: NEGATIVE /HPF — SIGNIFICANT CHANGE UP
BASOPHILS # BLD AUTO: 0.07 K/UL — SIGNIFICANT CHANGE UP (ref 0–0.2)
BASOPHILS NFR BLD AUTO: 0.9 % — SIGNIFICANT CHANGE UP (ref 0–2)
BILIRUB SERPL-MCNC: 0.3 MG/DL — SIGNIFICANT CHANGE UP (ref 0.2–1.2)
BILIRUB UR-MCNC: NEGATIVE — SIGNIFICANT CHANGE UP
BLD GP AB SCN SERPL QL: SIGNIFICANT CHANGE UP
BUN SERPL-MCNC: 14 MG/DL — SIGNIFICANT CHANGE UP (ref 7–23)
CALCIUM SERPL-MCNC: 9.2 MG/DL — SIGNIFICANT CHANGE UP (ref 8.5–10.1)
CAST: 0 /LPF — SIGNIFICANT CHANGE UP (ref 0–4)
CHLORIDE SERPL-SCNC: 113 MMOL/L — HIGH (ref 96–108)
CO2 SERPL-SCNC: 22 MMOL/L — SIGNIFICANT CHANGE UP (ref 22–31)
COLOR SPEC: YELLOW — SIGNIFICANT CHANGE UP
CREAT SERPL-MCNC: 1.41 MG/DL — HIGH (ref 0.5–1.3)
DIFF PNL FLD: ABNORMAL
EGFR: 36 ML/MIN/1.73M2 — LOW
EOSINOPHIL # BLD AUTO: 0.08 K/UL — SIGNIFICANT CHANGE UP (ref 0–0.5)
EOSINOPHIL NFR BLD AUTO: 1 % — SIGNIFICANT CHANGE UP (ref 0–6)
GLUCOSE SERPL-MCNC: 118 MG/DL — HIGH (ref 70–99)
GLUCOSE UR QL: NEGATIVE MG/DL — SIGNIFICANT CHANGE UP
HCT VFR BLD CALC: 34 % — LOW (ref 34.5–45)
HCT VFR BLD CALC: 34.1 % — LOW (ref 34.5–45)
HGB BLD-MCNC: 10.8 G/DL — LOW (ref 11.5–15.5)
HGB BLD-MCNC: 10.9 G/DL — LOW (ref 11.5–15.5)
IMM GRANULOCYTES NFR BLD AUTO: 0.5 % — SIGNIFICANT CHANGE UP (ref 0–0.9)
INR BLD: 1.4 RATIO — HIGH (ref 0.85–1.18)
KETONES UR-MCNC: NEGATIVE MG/DL — SIGNIFICANT CHANGE UP
LACTATE SERPL-SCNC: 1.4 MMOL/L — SIGNIFICANT CHANGE UP (ref 0.7–2)
LEUKOCYTE ESTERASE UR-ACNC: ABNORMAL
LIDOCAIN IGE QN: 43 U/L — SIGNIFICANT CHANGE UP (ref 13–75)
LYMPHOCYTES # BLD AUTO: 1.44 K/UL — SIGNIFICANT CHANGE UP (ref 1–3.3)
LYMPHOCYTES # BLD AUTO: 18.7 % — SIGNIFICANT CHANGE UP (ref 13–44)
MCHC RBC-ENTMCNC: 27.2 PG — SIGNIFICANT CHANGE UP (ref 27–34)
MCHC RBC-ENTMCNC: 32 GM/DL — SIGNIFICANT CHANGE UP (ref 32–36)
MCV RBC AUTO: 85 FL — SIGNIFICANT CHANGE UP (ref 80–100)
MONOCYTES # BLD AUTO: 0.24 K/UL — SIGNIFICANT CHANGE UP (ref 0–0.9)
MONOCYTES NFR BLD AUTO: 3.1 % — SIGNIFICANT CHANGE UP (ref 2–14)
NEUTROPHILS # BLD AUTO: 5.83 K/UL — SIGNIFICANT CHANGE UP (ref 1.8–7.4)
NEUTROPHILS NFR BLD AUTO: 75.8 % — SIGNIFICANT CHANGE UP (ref 43–77)
NITRITE UR-MCNC: NEGATIVE — SIGNIFICANT CHANGE UP
PH UR: 7.5 — SIGNIFICANT CHANGE UP (ref 5–8)
PLATELET # BLD AUTO: 126 K/UL — LOW (ref 150–400)
POTASSIUM SERPL-MCNC: 4 MMOL/L — SIGNIFICANT CHANGE UP (ref 3.5–5.3)
POTASSIUM SERPL-SCNC: 4 MMOL/L — SIGNIFICANT CHANGE UP (ref 3.5–5.3)
PROT SERPL-MCNC: 6 GM/DL — SIGNIFICANT CHANGE UP (ref 6–8.3)
PROT UR-MCNC: 30 MG/DL
PROTHROM AB SERPL-ACNC: 15.7 SEC — HIGH (ref 9.5–13)
RBC # BLD: 4.01 M/UL — SIGNIFICANT CHANGE UP (ref 3.8–5.2)
RBC # FLD: 17.6 % — HIGH (ref 10.3–14.5)
RBC CASTS # UR COMP ASSIST: 0 /HPF — SIGNIFICANT CHANGE UP (ref 0–4)
SODIUM SERPL-SCNC: 140 MMOL/L — SIGNIFICANT CHANGE UP (ref 135–145)
SP GR SPEC: <1.005 — LOW (ref 1–1.03)
SQUAMOUS # UR AUTO: 1 /HPF — SIGNIFICANT CHANGE UP (ref 0–5)
UROBILINOGEN FLD QL: 0.2 MG/DL — SIGNIFICANT CHANGE UP (ref 0.2–1)
WBC # BLD: 7.7 K/UL — SIGNIFICANT CHANGE UP (ref 3.8–10.5)
WBC # FLD AUTO: 7.7 K/UL — SIGNIFICANT CHANGE UP (ref 3.8–10.5)
WBC UR QL: 5 /HPF — SIGNIFICANT CHANGE UP (ref 0–5)

## 2024-08-23 PROCEDURE — 87086 URINE CULTURE/COLONY COUNT: CPT

## 2024-08-23 PROCEDURE — 86850 RBC ANTIBODY SCREEN: CPT

## 2024-08-23 PROCEDURE — 85025 COMPLETE CBC W/AUTO DIFF WBC: CPT

## 2024-08-23 PROCEDURE — 85018 HEMOGLOBIN: CPT

## 2024-08-23 PROCEDURE — 99283 EMERGENCY DEPT VISIT LOW MDM: CPT | Mod: 25

## 2024-08-23 PROCEDURE — 83605 ASSAY OF LACTIC ACID: CPT

## 2024-08-23 PROCEDURE — 99284 EMERGENCY DEPT VISIT MOD MDM: CPT

## 2024-08-23 PROCEDURE — 85610 PROTHROMBIN TIME: CPT

## 2024-08-23 PROCEDURE — 85730 THROMBOPLASTIN TIME PARTIAL: CPT

## 2024-08-23 PROCEDURE — 85014 HEMATOCRIT: CPT

## 2024-08-23 PROCEDURE — 86900 BLOOD TYPING SEROLOGIC ABO: CPT

## 2024-08-23 PROCEDURE — 86901 BLOOD TYPING SEROLOGIC RH(D): CPT

## 2024-08-23 PROCEDURE — 81001 URINALYSIS AUTO W/SCOPE: CPT

## 2024-08-23 PROCEDURE — 83690 ASSAY OF LIPASE: CPT

## 2024-08-23 PROCEDURE — 80053 COMPREHEN METABOLIC PANEL: CPT

## 2024-08-23 PROCEDURE — 36415 COLL VENOUS BLD VENIPUNCTURE: CPT

## 2024-08-23 RX ORDER — BACTERIOSTATIC SODIUM CHLORIDE 0.9 %
500 VIAL (ML) INJECTION ONCE
Refills: 0 | Status: COMPLETED | OUTPATIENT
Start: 2024-08-23 | End: 2024-08-23

## 2024-08-23 RX ADMIN — Medication 500 MILLILITER(S): at 21:56

## 2024-08-23 NOTE — ED PROVIDER NOTE - GASTROINTESTINAL, MLM
Abdomen soft, non-tender, no guarding.rectal external non thrombosed hemorrhoids, heme pos min stool [id number] exp [id number] qc pos

## 2024-08-23 NOTE — ED PROVIDER NOTE - PATIENT PORTAL LINK FT
You can access the FollowMyHealth Patient Portal offered by [facility] by registering at the following website: [email or web address]. By joining [employer]’s FollowMyHealth portal, you will also be able to view your health information using other applications (apps) compatible with our system.

## 2024-08-23 NOTE — ED ADULT TRIAGE NOTE - NS ED NURSE BANDS TYPE
I reviewed the H&P, I examined the patient, and there are no changes in the patient's condition.   Name band;

## 2024-08-23 NOTE — ED PROVIDER NOTE - OBJECTIVE STATEMENT
Patient is an 89-year-old female with a history of hard of hearing and stomach cancer on oral chemo followed by [facility] with a recent admission for malaise who complained of forcing herself to have a bowel movement initially the stool came out hard and then afterwards it came out soft and she noted some drops of blood patient has a history of A-fib and is on Eliquis and she became concerned tonight no abdominal pain no nausea or vomiting no fever was not having any diarrhea she states she had a similar episode last week with the same occurred where she had a hard stool while she forced and then afterwards had a little bit of blood on the soft stool that subsequently came afterwards patient has not had a colonoscopy for more than 25 years does not presently have a GI but is switching all of her doctors to doctors at [facility] and [facility] - - -

## 2024-08-23 NOTE — ED PROVIDER NOTE - CARE PROVIDER_API CALL
Ronald Bentley  Gastroenterology  [address] [place] [id number]  Phone: [phone]  Fax: [phone]  Follow Up Time:

## 2024-08-23 NOTE — ED ADULT NURSE NOTE - NSFALLUNIVINTERV_ED_ALL_ED
Bed/Stretcher in lowest position, wheels locked, appropriate side rails in place/Call bell, personal items and telephone in reach/Instruct patient to call for assistance before getting out of bed/chair/stretcher/Non-slip footwear applied when patient is off stretcher/[place] to call system/Physically safe environment - no spills, clutter or unnecessary equipment/Purposeful proactive rounding/Room/bathroom lighting operational, light cord in reach

## 2024-08-23 NOTE — ED PROVIDER NOTE - NSFOLLOWUPINSTRUCTIONS_ED_ALL_ED_FT
Follow up on Monday with your Gi doctor at [facility] or with Dr. Bentley. Return to [facility] if worse.    Rectal Bleeding    WHAT YOU NEED TO KNOW:    What can cause rectal bleeding?    Constipation    Hemorrhoids (swollen blood vessels in your rectum)    Anal fissures (tears in the tissue inside your anus)    Medical conditions, such as cancer, colitis, or diverticulitis    Growths, such as tumors or polyps    Medical treatments, such as radiation or rectal surgery  What increases my risk for rectal bleeding?    Older age    Certain medicines, such as blood thinners and NSAIDs    Medical conditions, such as inflammatory bowel disease, liver disease, or HIV  What other signs and symptoms may happen with rectal bleeding? You may have pain in your rectum or anus. You may also have abdominal pain or cramping.    How is the cause of rectal bleeding diagnosed?    Rectal exam: Your healthcare provider may gently insert a gloved finger into your anus. He or she will collect a bowel movement sample and send it to a lab for tests.    Blood tests: You may need blood taken to check for anemia (low amount of red blood cells).    CT scan: This test is also called a CAT scan. An x-ray machine uses a computer to take pictures of the organs and blood vessels in your abdomen. The pictures may show problems that could cause bleeding. You may be given a dye before the pictures are taken to help healthcare providers see the pictures better. Tell the healthcare provider if you have ever had an allergic reaction to contrast dye.    Colonoscopy: This is a procedure to look inside your lower bowel. It may show where the bleeding is coming from and what is causing it. A tube with a light on the end will be put into your anus and then moved into your colon. If your healthcare provider finds a growth, he or she may remove it.    Endoscopy: This is a procedure to look inside your upper bowel. It may show where the bleeding is coming from and what is causing it. A tube with a light on the end is inserted into your throat and moved down into your stomach and upper bowel. If your healthcare provider finds a growth, he or she may remove it. He or she may put a shot of medicine in bleeding areas to narrow the blood vessels and stop the bleeding. Heat, laser, or electric currents may also be used to make the blood clot.  How is rectal bleeding treated?    Medicine:  Pain medicine: You may be given a prescription medicine to decrease pain. Do not wait until the pain is severe before you take this medicine.    Vasoconstrictors: This medicine decreases the size of your blood vessels and may help stop the bleeding.    Iron supplement: Iron helps your body make more red blood cells.    Steroids: This medicine decreases inflammation in your rectum. It may be applied as a cream, ointment, or lotion.    IV: You may need an IV if you are dehydrated and need extra liquids.    A blood transfusion replaces blood in your body to help it work properly. A blood transfusion is given through an IV. Blood is tested for safety before it is used.    Surgery: You may need surgery to remove hemorrhoids, tumors, or polyps.  What are the risks of rectal bleeding?    You may have abdominal pain or damage to nearby organs and blood vessels with surgery. Even with treatment, rectal bleeding may continue. Or, it may go away for a time and start again.    Without treatment, you may continue to have pain and cramping. You may develop anemia. You may need a blood transfusion. You may lose a large amount of blood. This can be life-threatening.  How can I manage my symptoms? Ask your healthcare provider how much liquid to drink each day and which liquids are best for you. This will help prevent dehydration and constipation.    When should I contact my healthcare provider?    You have a fever.    Your rectal bleeding stopped for a time, but has started again.    You have nausea.    You have cold, sweaty, pale skin.    You have changes in your bowel movements, such as diarrhea.    You have questions or concerns about your condition or care.  When should I seek immediate care or call 911?    You are breathing faster than usual.    You are dizzy, lightheaded, or feel faint.    You are confused or cannot think clearly.    You urinate less than usual or not at all.    Your rectal bleeding is constant or heavy.    You have severe abdominal pain or cramping.  CARE AGREEMENT:    You have the right to help plan your care. Learn about your health condition and how it may be treated. Discuss treatment options with your healthcare providers to decide what care you want to receive. You always have the right to refuse treatment.    © [employer] 1973, 2024

## 2024-08-23 NOTE — ED ADULT TRIAGE NOTE - CHIEF COMPLAINT QUOTE
Pt sent by KRISTINA LONGORIA for blood in stool. Pt states she noted BRBPR when wiping and on stool. Denies bright red blood in toilet, lightheadedness, dizziness, SOB or chest pain. Pt well appearing, no acute distress noted.

## 2024-08-23 NOTE — ED PROVIDER NOTE - MUSCULOSKELETAL, MLM
Spine appears normal, range of motion is not limited, no muscle or joint tenderness rheumatoid changes of hand joints

## 2024-08-23 NOTE — ED ADULT NURSE NOTE - OBJECTIVE STATEMENT
Pt sent by KRISTINA LONGORIA for blood in stool. Pt states she noted BRBPR when wiping and on stool. Denies bright red blood in toilet, lightheadedness, dizziness, SOB or chest pain. Pt well appearing,

## 2024-08-24 VITALS
HEART RATE: 68 BPM | RESPIRATION RATE: 16 BRPM | TEMPERATURE: 98 F | SYSTOLIC BLOOD PRESSURE: 119 MMHG | OXYGEN SATURATION: 99 % | DIASTOLIC BLOOD PRESSURE: 81 MMHG

## 2024-08-25 LAB
CULTURE RESULTS: SIGNIFICANT CHANGE UP
SPECIMEN SOURCE: SIGNIFICANT CHANGE UP

## 2024-10-02 ENCOUNTER — APPOINTMENT (OUTPATIENT)
Dept: GASTROENTEROLOGY | Facility: CLINIC | Age: 89
End: 2024-10-02

## 2024-12-16 ENCOUNTER — APPOINTMENT (OUTPATIENT)
Dept: SURGICAL ONCOLOGY | Facility: CLINIC | Age: 89
End: 2024-12-16
Payer: COMMERCIAL

## 2024-12-16 ENCOUNTER — APPOINTMENT (OUTPATIENT)
Dept: SURGICAL ONCOLOGY | Facility: CLINIC | Age: 89
End: 2024-12-16

## 2024-12-16 DIAGNOSIS — C49.A3 GASTROINTESTINAL STROMAL TUMOR OF SMALL INTESTINE: ICD-10-CM

## 2024-12-16 PROCEDURE — 99213 OFFICE O/P EST LOW 20 MIN: CPT

## 2024-12-16 PROCEDURE — 99203 OFFICE O/P NEW LOW 30 MIN: CPT

## 2025-01-16 ENCOUNTER — APPOINTMENT (OUTPATIENT)
Dept: OBGYN | Facility: CLINIC | Age: 89
End: 2025-01-16
Payer: MEDICARE

## 2025-01-16 VITALS
WEIGHT: 161 LBS | BODY MASS INDEX: 28.53 KG/M2 | SYSTOLIC BLOOD PRESSURE: 134 MMHG | DIASTOLIC BLOOD PRESSURE: 80 MMHG | HEIGHT: 63 IN

## 2025-01-16 DIAGNOSIS — Z01.419 ENCOUNTER FOR GYNECOLOGICAL EXAMINATION (GENERAL) (ROUTINE) W/OUT ABNORMAL FINDINGS: ICD-10-CM

## 2025-01-16 PROCEDURE — G0101: CPT

## 2025-01-16 PROCEDURE — 99397 PER PM REEVAL EST PAT 65+ YR: CPT | Mod: GY

## 2025-07-09 NOTE — PROCEDURAL SAFETY CHECKLIST WITH OR WITHOUT SEDATION - NSRESOLVEDISCREP_GEN_ALL_CORE
Important Medication Changes: None    Further testing to be done: None    Next follow up visit: In office in 1 year  We will check your device remotely in 3, 6, and 9 months      HERE IS SOME IMPORTANT INFORMATION FOR OUR PATIENTS    If you need refills- call your pharmacist and they will contact our office.    If you have medical concerns call    [phone] ([facility])                                                           [phone]  ([place])                                                           [phone]  ([place])                                                           [phone]  ([place])                 If you have a question during office hours, or need to make an appointment call [phone]. You will eventually speak with my nurses. If you need to speak to me directly, let them know and I will get back to you as soon as possible.  Better yet, you can consider signing up for [employer], our popular online communication portal to schedule an appointment, ask for a refill, see your results, or message our staff. Go to: [email or web address]      Marco Antonio Marlow MD     done

## 2025-07-23 ENCOUNTER — APPOINTMENT (OUTPATIENT)
Dept: SURGICAL ONCOLOGY | Facility: CLINIC | Age: 89
End: 2025-07-23
Payer: MEDICARE

## 2025-07-23 VITALS
RESPIRATION RATE: 16 BRPM | OXYGEN SATURATION: 98 % | HEART RATE: 66 BPM | WEIGHT: 149 LBS | SYSTOLIC BLOOD PRESSURE: 148 MMHG | HEIGHT: 63 IN | BODY MASS INDEX: 26.4 KG/M2 | DIASTOLIC BLOOD PRESSURE: 66 MMHG

## 2025-07-23 PROCEDURE — 99213 OFFICE O/P EST LOW 20 MIN: CPT

## 2025-08-08 ENCOUNTER — APPOINTMENT (OUTPATIENT)
Dept: OBGYN | Facility: CLINIC | Age: 89
End: 2025-08-08
Payer: MEDICARE

## 2025-08-08 ENCOUNTER — NON-APPOINTMENT (OUTPATIENT)
Age: 89
End: 2025-08-08

## 2025-08-08 VITALS
DIASTOLIC BLOOD PRESSURE: 82 MMHG | BODY MASS INDEX: 26.05 KG/M2 | HEIGHT: 63 IN | WEIGHT: 147 LBS | SYSTOLIC BLOOD PRESSURE: 127 MMHG

## 2025-08-08 DIAGNOSIS — K59.00 CONSTIPATION, UNSPECIFIED: ICD-10-CM

## 2025-08-08 DIAGNOSIS — N95.2 POSTMENOPAUSAL ATROPHIC VAGINITIS: ICD-10-CM

## 2025-08-08 DIAGNOSIS — M85.80 OTHER SPECIFIED DISORDERS OF BONE DENSITY AND STRUCTURE, UNSPECIFIED SITE: ICD-10-CM

## 2025-08-08 DIAGNOSIS — Z12.39 ENCOUNTER FOR OTHER SCREENING FOR MALIGNANT NEOPLASM OF BREAST: ICD-10-CM

## 2025-08-08 DIAGNOSIS — Z85.831 PERSONAL HISTORY OF MALIGNANT NEOPLASM OF SOFT TISSUE: ICD-10-CM

## 2025-08-08 DIAGNOSIS — N63.0 UNSPECIFIED LUMP IN UNSPECIFIED BREAST: ICD-10-CM

## 2025-08-08 DIAGNOSIS — I86.3 VULVAL VARICES: ICD-10-CM

## 2025-08-08 PROCEDURE — 99204 OFFICE O/P NEW MOD 45 MIN: CPT

## 2025-08-08 RX ORDER — METOPROLOL TARTRATE 25 MG/1
25 TABLET ORAL
Refills: 0 | Status: ACTIVE | COMMUNITY

## 2025-08-10 PROBLEM — Z85.831: Status: RESOLVED | Noted: 2025-08-10 | Resolved: 2025-08-10

## 2025-09-05 DIAGNOSIS — N95.2 POSTMENOPAUSAL ATROPHIC VAGINITIS: ICD-10-CM

## 2025-09-05 RX ORDER — ESTRADIOL 0.1 MG/G
0.1 CREAM VAGINAL
Qty: 1 | Refills: 4 | Status: ACTIVE | COMMUNITY
Start: 2025-09-05 | End: 1900-01-01